# Patient Record
Sex: FEMALE | Race: WHITE | NOT HISPANIC OR LATINO | Employment: UNEMPLOYED | URBAN - METROPOLITAN AREA
[De-identification: names, ages, dates, MRNs, and addresses within clinical notes are randomized per-mention and may not be internally consistent; named-entity substitution may affect disease eponyms.]

---

## 2017-03-23 RX ORDER — MONTELUKAST SODIUM 10 MG/1
TABLET ORAL
Qty: 30 TABLET | Refills: 9 | OUTPATIENT
Start: 2017-03-23

## 2017-03-23 RX ORDER — FLUTICASONE PROPIONATE 50 MCG
SPRAY, SUSPENSION (ML) NASAL
Refills: 9 | OUTPATIENT
Start: 2017-03-23

## 2017-04-19 RX ORDER — CARVEDILOL 12.5 MG/1
TABLET ORAL
Qty: 60 TABLET | Refills: 2 | OUTPATIENT
Start: 2017-04-19

## 2017-04-19 RX ORDER — CLOPIDOGREL BISULFATE 75 MG/1
TABLET ORAL
Qty: 30 TABLET | Refills: 2 | OUTPATIENT
Start: 2017-04-19

## 2017-04-19 RX ORDER — ATORVASTATIN CALCIUM 20 MG/1
TABLET, FILM COATED ORAL
Qty: 30 TABLET | Refills: 2 | OUTPATIENT
Start: 2017-04-19

## 2017-04-19 RX ORDER — AMLODIPINE BESYLATE 5 MG/1
TABLET ORAL
Qty: 60 TABLET | Refills: 2 | OUTPATIENT
Start: 2017-04-19

## 2017-04-25 RX ORDER — CARVEDILOL 12.5 MG/1
TABLET ORAL
Qty: 60 TABLET | Refills: 2 | OUTPATIENT
Start: 2017-04-25

## 2017-04-25 RX ORDER — AMLODIPINE BESYLATE 5 MG/1
TABLET ORAL
Qty: 60 TABLET | Refills: 2 | OUTPATIENT
Start: 2017-04-25

## 2017-04-25 RX ORDER — MONTELUKAST SODIUM 10 MG/1
TABLET ORAL
Qty: 30 TABLET | Refills: 9 | OUTPATIENT
Start: 2017-04-25

## 2017-04-25 RX ORDER — CLOPIDOGREL BISULFATE 75 MG/1
TABLET ORAL
Qty: 30 TABLET | Refills: 2 | OUTPATIENT
Start: 2017-04-25

## 2017-04-25 RX ORDER — ATORVASTATIN CALCIUM 20 MG/1
TABLET, FILM COATED ORAL
Qty: 30 TABLET | Refills: 2 | OUTPATIENT
Start: 2017-04-25

## 2017-04-25 RX ORDER — FLUTICASONE PROPIONATE 50 MCG
SPRAY, SUSPENSION (ML) NASAL
Refills: 9 | OUTPATIENT
Start: 2017-04-25

## 2017-04-26 ENCOUNTER — TELEPHONE (OUTPATIENT)
Dept: INTERNAL MEDICINE | Facility: CLINIC | Age: 62
End: 2017-04-26

## 2017-04-26 RX ORDER — ATORVASTATIN CALCIUM 20 MG/1
20 TABLET, FILM COATED ORAL DAILY
Qty: 30 TABLET | Refills: 0 | Status: SHIPPED | OUTPATIENT
Start: 2017-04-26 | End: 2017-05-22 | Stop reason: SDUPTHER

## 2017-04-26 RX ORDER — CARVEDILOL 12.5 MG/1
12.5 TABLET ORAL 2 TIMES DAILY WITH MEALS
Qty: 60 TABLET | Refills: 0 | Status: SHIPPED | OUTPATIENT
Start: 2017-04-26 | End: 2017-05-22 | Stop reason: SDUPTHER

## 2017-04-26 RX ORDER — CLOPIDOGREL BISULFATE 75 MG/1
75 TABLET ORAL DAILY
Qty: 30 TABLET | Refills: 0 | Status: SHIPPED | OUTPATIENT
Start: 2017-04-26 | End: 2017-05-22 | Stop reason: SDUPTHER

## 2017-04-26 RX ORDER — PANTOPRAZOLE SODIUM 40 MG/1
40 TABLET, DELAYED RELEASE ORAL DAILY
Qty: 30 TABLET | Refills: 0 | Status: SHIPPED | OUTPATIENT
Start: 2017-04-26 | End: 2017-05-22 | Stop reason: SDUPTHER

## 2017-04-26 RX ORDER — AMLODIPINE BESYLATE 5 MG/1
5 TABLET ORAL DAILY
Qty: 60 TABLET | Refills: 0 | Status: SHIPPED | OUTPATIENT
Start: 2017-04-26 | End: 2017-05-22 | Stop reason: SDUPTHER

## 2017-04-26 NOTE — TELEPHONE ENCOUNTER
----- Message from Sofya Doherty sent at 4/26/2017  2:28 PM EDT -----  PT CALLED NEEDING REFILLS ON RXS clopidogrel, carvedilol, amLODIPine, atorvastatin, AND pantoprazole  SHE CAN BE REACHED [phone]    [facility]

## 2017-05-22 ENCOUNTER — OFFICE VISIT (OUTPATIENT)
Dept: INTERNAL MEDICINE | Facility: CLINIC | Age: 62
End: 2017-05-22

## 2017-05-22 VITALS
TEMPERATURE: 98 F | HEART RATE: 74 BPM | OXYGEN SATURATION: 97 % | DIASTOLIC BLOOD PRESSURE: 76 MMHG | WEIGHT: 151 LBS | BODY MASS INDEX: 26.75 KG/M2 | RESPIRATION RATE: 18 BRPM | SYSTOLIC BLOOD PRESSURE: 140 MMHG

## 2017-05-22 DIAGNOSIS — I10 ESSENTIAL HYPERTENSION: Primary | ICD-10-CM

## 2017-05-22 DIAGNOSIS — K21.9 GASTROESOPHAGEAL REFLUX DISEASE, ESOPHAGITIS PRESENCE NOT SPECIFIED: ICD-10-CM

## 2017-05-22 DIAGNOSIS — I10 ESSENTIAL HYPERTENSION: ICD-10-CM

## 2017-05-22 DIAGNOSIS — E11.9 TYPE 2 DIABETES MELLITUS WITHOUT COMPLICATION, WITHOUT LONG-TERM CURRENT USE OF INSULIN (HCC): ICD-10-CM

## 2017-05-22 PROBLEM — J30.2 SEASONAL ALLERGIC RHINITIS: Status: ACTIVE | Noted: 2017-05-22

## 2017-05-22 PROBLEM — J44.9 CHRONIC OBSTRUCTIVE PULMONARY DISEASE (HCC): Status: ACTIVE | Noted: 2017-05-22

## 2017-05-22 PROBLEM — E34.9 DISORDER OF ENDOCRINE SYSTEM: Status: ACTIVE | Noted: 2017-05-22

## 2017-05-22 PROBLEM — K25.9 PEPTIC ULCER OF STOMACH: Status: ACTIVE | Noted: 2017-05-22

## 2017-05-22 PROBLEM — M06.9 RHEUMATOID ARTHRITIS (HCC): Status: ACTIVE | Noted: 2017-05-22

## 2017-05-22 PROBLEM — K83.8 BILIARY SLUDGE: Status: ACTIVE | Noted: 2017-05-22

## 2017-05-22 PROBLEM — M54.50 CHRONIC LOW BACK PAIN: Status: ACTIVE | Noted: 2017-05-22

## 2017-05-22 PROBLEM — N39.0 CHRONIC URINARY TRACT INFECTION: Status: ACTIVE | Noted: 2017-05-22

## 2017-05-22 PROBLEM — E78.5 DYSLIPIDEMIA: Status: ACTIVE | Noted: 2017-05-22

## 2017-05-22 PROBLEM — N28.9 DISORDER OF KIDNEY: Status: ACTIVE | Noted: 2017-05-22

## 2017-05-22 PROBLEM — I63.9 CEREBRAL INFARCTION (HCC): Status: ACTIVE | Noted: 2017-05-22

## 2017-05-22 PROBLEM — M81.0 OSTEOPOROSIS: Status: ACTIVE | Noted: 2017-05-22

## 2017-05-22 PROBLEM — I25.10 CORONARY ARTERIOSCLEROSIS IN NATIVE ARTERY: Status: ACTIVE | Noted: 2017-05-22

## 2017-05-22 PROBLEM — I48.91 ATRIAL FIBRILLATION (HCC): Status: ACTIVE | Noted: 2017-05-22

## 2017-05-22 PROBLEM — J45.909 ASTHMA: Status: ACTIVE | Noted: 2017-05-22

## 2017-05-22 PROBLEM — K82.8 BILIARY DYSKINESIA: Status: ACTIVE | Noted: 2017-05-22

## 2017-05-22 PROBLEM — F41.8 MIXED ANXIETY DEPRESSIVE DISORDER: Status: ACTIVE | Noted: 2017-05-22

## 2017-05-22 PROBLEM — R91.8 LUNG MASS: Status: ACTIVE | Noted: 2017-05-22

## 2017-05-22 PROBLEM — M79.7 FIBROMYALGIA: Status: ACTIVE | Noted: 2017-05-22

## 2017-05-22 PROBLEM — M19.90 OSTEOARTHRITIS: Status: ACTIVE | Noted: 2017-05-22

## 2017-05-22 PROBLEM — K74.60 HEPATIC CIRRHOSIS (HCC): Status: ACTIVE | Noted: 2017-05-22

## 2017-05-22 PROBLEM — J42 CHRONIC BRONCHITIS (HCC): Status: ACTIVE | Noted: 2017-05-22

## 2017-05-22 PROBLEM — G89.29 CHRONIC LOW BACK PAIN: Status: ACTIVE | Noted: 2017-05-22

## 2017-05-22 LAB — HBA1C MFR BLD: 6 %

## 2017-05-22 PROCEDURE — 99214 OFFICE O/P EST MOD 30 MIN: CPT | Performed by: PHYSICIAN ASSISTANT

## 2017-05-22 PROCEDURE — 83036 HEMOGLOBIN GLYCOSYLATED A1C: CPT | Performed by: PHYSICIAN ASSISTANT

## 2017-05-22 RX ORDER — AMLODIPINE BESYLATE 10 MG/1
10 TABLET ORAL DAILY
Qty: 90 TABLET | Refills: 0 | Status: SHIPPED | OUTPATIENT
Start: 2017-05-22 | End: 2017-08-16 | Stop reason: SDUPTHER

## 2017-05-22 RX ORDER — AMLODIPINE BESYLATE 10 MG/1
10 TABLET ORAL DAILY
Qty: 30 TABLET | Refills: 5 | Status: SHIPPED | OUTPATIENT
Start: 2017-05-22 | End: 2017-05-22 | Stop reason: SDUPTHER

## 2017-05-22 RX ORDER — FLUTICASONE PROPIONATE 50 MCG
SPRAY, SUSPENSION (ML) NASAL DAILY
COMMUNITY
Start: 2016-03-01 | End: 2018-04-10

## 2017-05-22 RX ORDER — ATORVASTATIN CALCIUM 20 MG/1
20 TABLET, FILM COATED ORAL NIGHTLY
Qty: 90 TABLET | Refills: 0 | Status: SHIPPED | OUTPATIENT
Start: 2017-05-22 | End: 2018-04-30 | Stop reason: SDUPTHER

## 2017-05-22 RX ORDER — ATORVASTATIN CALCIUM 20 MG/1
TABLET, FILM COATED ORAL
Qty: 30 TABLET | Refills: 0 | Status: SHIPPED | OUTPATIENT
Start: 2017-05-22 | End: 2017-05-22 | Stop reason: SDUPTHER

## 2017-05-22 RX ORDER — PANTOPRAZOLE SODIUM 40 MG/1
40 TABLET, DELAYED RELEASE ORAL DAILY
Qty: 90 TABLET | Refills: 0 | Status: SHIPPED | OUTPATIENT
Start: 2017-05-22 | End: 2018-06-04 | Stop reason: SDUPTHER

## 2017-05-22 RX ORDER — CLOPIDOGREL BISULFATE 75 MG/1
75 TABLET ORAL DAILY
Qty: 30 TABLET | Refills: 5 | Status: SHIPPED | OUTPATIENT
Start: 2017-05-22 | End: 2017-05-22 | Stop reason: SDUPTHER

## 2017-05-22 RX ORDER — DIPHENHYDRAMINE HCL 25 MG
25 TABLET ORAL 2 TIMES DAILY PRN
Status: ON HOLD | COMMUNITY
Start: 2016-01-20 | End: 2018-07-11

## 2017-05-22 RX ORDER — CARVEDILOL 12.5 MG/1
12.5 TABLET ORAL 2 TIMES DAILY WITH MEALS
Qty: 60 TABLET | Refills: 5 | Status: SHIPPED | OUTPATIENT
Start: 2017-05-22 | End: 2017-05-22 | Stop reason: SDUPTHER

## 2017-05-22 RX ORDER — ATORVASTATIN CALCIUM 20 MG/1
20 TABLET, FILM COATED ORAL NIGHTLY
Qty: 30 TABLET | Refills: 5 | Status: SHIPPED | OUTPATIENT
Start: 2017-05-22 | End: 2017-05-22 | Stop reason: SDUPTHER

## 2017-05-22 RX ORDER — CLOPIDOGREL BISULFATE 75 MG/1
75 TABLET ORAL DAILY
Qty: 90 TABLET | Refills: 0 | Status: SHIPPED | OUTPATIENT
Start: 2017-05-22 | End: 2018-04-02 | Stop reason: SDUPTHER

## 2017-05-22 RX ORDER — PANTOPRAZOLE SODIUM 40 MG/1
40 TABLET, DELAYED RELEASE ORAL DAILY
Qty: 30 TABLET | Refills: 2 | Status: SHIPPED | OUTPATIENT
Start: 2017-05-22 | End: 2017-05-22 | Stop reason: SDUPTHER

## 2017-05-22 RX ORDER — CARVEDILOL 12.5 MG/1
12.5 TABLET ORAL 2 TIMES DAILY WITH MEALS
Qty: 180 TABLET | Refills: 0 | Status: SHIPPED | OUTPATIENT
Start: 2017-05-22 | End: 2018-04-02 | Stop reason: SDUPTHER

## 2017-05-22 RX ORDER — CARVEDILOL 12.5 MG/1
TABLET ORAL
Qty: 60 TABLET | Refills: 0 | Status: SHIPPED | OUTPATIENT
Start: 2017-05-22 | End: 2017-05-22 | Stop reason: SDUPTHER

## 2017-05-22 RX ORDER — LEVOCETIRIZINE DIHYDROCHLORIDE 5 MG/1
TABLET, FILM COATED ORAL DAILY
COMMUNITY
Start: 2016-03-01 | End: 2018-04-10

## 2017-05-22 RX ORDER — SAL ACID/UREA/PETROLATUM,WHITE 5 %-10 %
OINTMENT (GRAM) TOPICAL
COMMUNITY
Start: 2015-09-25 | End: 2018-04-10 | Stop reason: HOSPADM

## 2017-05-22 RX ORDER — CLOPIDOGREL BISULFATE 75 MG/1
TABLET ORAL
Qty: 30 TABLET | Refills: 0 | Status: SHIPPED | OUTPATIENT
Start: 2017-05-22 | End: 2017-05-22 | Stop reason: SDUPTHER

## 2017-08-16 DIAGNOSIS — I10 ESSENTIAL HYPERTENSION: ICD-10-CM

## 2017-08-16 RX ORDER — AMLODIPINE BESYLATE 10 MG/1
TABLET ORAL
Qty: 90 TABLET | Refills: 0 | Status: SHIPPED | OUTPATIENT
Start: 2017-08-16 | End: 2017-11-16 | Stop reason: SDUPTHER

## 2017-11-16 DIAGNOSIS — I10 ESSENTIAL HYPERTENSION: ICD-10-CM

## 2017-11-16 RX ORDER — AMLODIPINE BESYLATE 10 MG/1
10 TABLET ORAL DAILY
Qty: 90 TABLET | Refills: 0 | Status: SHIPPED | OUTPATIENT
Start: 2017-11-16 | End: 2018-04-02 | Stop reason: SDUPTHER

## 2018-03-04 DIAGNOSIS — I10 ESSENTIAL HYPERTENSION: ICD-10-CM

## 2018-03-05 RX ORDER — AMLODIPINE BESYLATE 10 MG/1
TABLET ORAL
Qty: 90 TABLET | Refills: 0 | OUTPATIENT
Start: 2018-03-05

## 2018-03-29 DIAGNOSIS — I10 ESSENTIAL HYPERTENSION: ICD-10-CM

## 2018-03-29 RX ORDER — AMLODIPINE BESYLATE 10 MG/1
TABLET ORAL
Qty: 90 TABLET | Refills: 0 | OUTPATIENT
Start: 2018-03-29

## 2018-04-02 DIAGNOSIS — I10 ESSENTIAL HYPERTENSION: ICD-10-CM

## 2018-04-02 RX ORDER — CLOPIDOGREL BISULFATE 75 MG/1
75 TABLET ORAL DAILY
Qty: 30 TABLET | Refills: 0 | Status: SHIPPED | OUTPATIENT
Start: 2018-04-02 | End: 2018-04-25 | Stop reason: HOSPADM

## 2018-04-02 RX ORDER — CARVEDILOL 12.5 MG/1
12.5 TABLET ORAL 2 TIMES DAILY WITH MEALS
Qty: 60 TABLET | Refills: 0 | Status: SHIPPED | OUTPATIENT
Start: 2018-04-02 | End: 2018-04-29 | Stop reason: SDUPTHER

## 2018-04-02 RX ORDER — AMLODIPINE BESYLATE 10 MG/1
TABLET ORAL
Qty: 30 TABLET | Refills: 0 | Status: SHIPPED | OUTPATIENT
Start: 2018-04-02 | End: 2018-04-29 | Stop reason: SDUPTHER

## 2018-04-02 NOTE — TELEPHONE ENCOUNTER
Notified patient that rx's had been sent to the pharmacy and we would see her next week at her appointment on 4-10-18.  Patient verbalized appreciation.

## 2018-04-02 NOTE — TELEPHONE ENCOUNTER
----- Message from Hillary Zuleta sent at 4/2/2018  2:41 PM EDT -----  Contact: SELF  KERA BARNETT CALLED TO SET UP AN APPT WITH YOU TO RE-EST FROM DR SANTAMARIA ON 4/1/18. SHE IS NEEDING REFILLS FOR AMLODIPINE, CARVEDILOL AND CLOPIDOGREL, SHE IS OUT AND WANTS TO KNOW IF SHE CAN GET ENOUGH TO LAST UNTIL HER APPT. SHE USES THE [facility] IN [place]. SHE CAN BE REACHED [phone]

## 2018-04-08 NOTE — PROGRESS NOTES
"Subjective:    Betsy Chi is a 62 y.o. female.     Chief Complaint   Patient presents with   • Hypertension     re-establish care, also wanted to check about pneumonia/flu shot   • Breast Pain     L breast pain, engorged       History of Present Illness     Here to reestablish care.    Patient complains of HTN, years of duration-reports it fluctuates, worsened with illness, stress, pain and smoking.    Patient complains of T2DM, years of duration, associated with hyperglycemia, does not check glucose at all, worsened with illness-requests glucometer.    Patient complains of GERD, years of duration, associated with reflux of \"bile\", worsened with  \"diseased gallbladder\", food triggers and smoking.      Complains of CVA history, 3 that she can remember with weakness and lability of mood-episodes of spontaneous crying. Risks increased with smoking and HTN.    Patient complains of lump in right breast and left nipple changes/breast hardening in past few months, last mammogram about 17 years ago patient reports she feels like she has breast cancer.     Patient complains of history of psoriasis, associated with red, itchy skin on both feet.    Complains of hyperlipidemia, years of duration-controlled, worsened with smoking, attempts to eat healthy diet.    Current Outpatient Prescriptions:   •  aluminum acetate (DOMEBORO) pack packet, Apply  topically 3 (Three) Times a Day As Needed (psoriatic arthritis)., Disp: 100 each, Rfl: 6  •  amLODIPine (NORVASC) 10 MG tablet, TAKE ONE TABLET BY MOUTH DAILY, Disp: 30 tablet, Rfl: 0  •  atorvastatin (LIPITOR) 20 MG tablet, Take 1 tablet by mouth Every Night., Disp: 90 tablet, Rfl: 0  •  carvedilol (COREG) 12.5 MG tablet, Take 1 tablet by mouth 2 (Two) Times a Day With Meals., Disp: 60 tablet, Rfl: 0  •  clopidogrel (PLAVIX) 75 MG tablet, Take 1 tablet by mouth Daily., Disp: 30 tablet, Rfl: 0  •  Cranberry 600 MG tablet, Take  by mouth., Disp: , Rfl:   •  diphenhydrAMINE " (BENADRYL) 25 MG tablet, Take  by mouth., Disp: , Rfl:   •  pantoprazole (PROTONIX) 40 MG EC tablet, Take 1 tablet by mouth Daily., Disp: 90 tablet, Rfl: 0  •  glucose blood test strip, Use as instructed, Disp: 100 each, Rfl: 12  •  glucose monitor monitoring kit, 1 each 3 (Three) Times a Day As Needed (hypo/hyperglycemia)., Disp: 1 each, Rfl: 3  •  nitrofurantoin, macrocrystal-monohydrate, (MACROBID) 100 MG capsule, Take 1 capsule by mouth Every 12 (Twelve) Hours for 7 days., Disp: 14 capsule, Rfl: 0     The following portions of the patient's history were reviewed and updated as appropriate: allergies, current medications, past family history, past medical history, past social history, past surgical history and problem list.    Review of Systems   Constitutional: Negative for chills, fatigue and fever.   HENT: Negative for congestion, dental problem, mouth sores, nosebleeds, postnasal drip, rhinorrhea, sinus pain, sinus pressure, sneezing and sore throat.         Denies snoring.   Eyes: Negative for pain, discharge, redness and itching.   Respiratory: Negative for cough, shortness of breath and wheezing.    Cardiovascular: Negative for chest pain, palpitations and leg swelling.        No RAY, orthopnea, PND, or claudication.   Gastrointestinal: Negative for abdominal distention, abdominal pain, blood in stool, diarrhea, nausea and vomiting.        GERD   Endocrine: Negative for cold intolerance, heat intolerance, polydipsia and polyuria.        T2DM   Genitourinary: Negative for difficulty urinating, dysuria, frequency, hematuria and urgency.        Urinary incontinence   Musculoskeletal: Positive for arthralgias. Negative for gait problem, joint swelling and myalgias.   Skin: Positive for color change and rash. Negative for wound.        Left breast changes, right breast lump, Psoriasis   Allergic/Immunologic: Negative.    Neurological: Negative for dizziness, syncope, weakness, light-headedness, numbness and  "headaches.        Memory loss after CVA   Hematological: Negative for adenopathy. Does not bruise/bleed easily.   Psychiatric/Behavioral: Negative for suicidal ideas.        Lability/spontaneous crying after CVA       Objective:    /72   Pulse 84   Temp 97.6 °F (36.4 °C) (Temporal Artery )   Resp 16   Ht 160 cm (63\")   Wt 77.1 kg (170 lb)   BMI 30.11 kg/m²     Physical Exam   Constitutional: She is oriented to person, place, and time. She appears well-developed and well-nourished. She is cooperative. She is easily aroused.  Non-toxic appearance. She does not have a sickly appearance. She does not appear ill. No distress.   HENT:   Head: Normocephalic and atraumatic. Head is without abrasion. Hair is normal.   Right Ear: Hearing, tympanic membrane, external ear and ear canal normal. No foreign bodies. Tympanic membrane is not perforated and not erythematous.   Left Ear: Hearing, tympanic membrane, external ear and ear canal normal. No foreign bodies. Tympanic membrane is not perforated and not erythematous.   Nose: Nose normal. No mucosal edema, rhinorrhea or septal deviation. No epistaxis.  No foreign bodies.   Mouth/Throat: Oropharynx is clear and moist. No oral lesions. Normal dentition.   Eyes: Conjunctivae and lids are normal. Pupils are equal, round, and reactive to light. Right eye exhibits no discharge. Left eye exhibits no discharge. Right conjunctiva is not injected. Left conjunctiva is not injected. No scleral icterus.   Neck: Normal range of motion and full passive range of motion without pain. Neck supple. No edema and normal range of motion present. No thyroid mass and no thyromegaly present.   Cardiovascular: Normal rate, regular rhythm and normal heart sounds.  Exam reveals no gallop and no friction rub.    No murmur heard.  Pulmonary/Chest: Effort normal and breath sounds normal. No accessory muscle usage. She has no rhonchi. She has no rales. Right breast exhibits mass and tenderness. " Left breast exhibits mass, skin change and tenderness.   Left breast with peau d'orange changes-dimpling, inversion of nipple/discoloration, breast hardened with tenderness right breast with 1 cm tender mobile lump at outer  at upper outer quadrant   Abdominal: Soft. Bowel sounds are normal. She exhibits no distension. There is no hepatosplenomegaly or hepatomegaly. There is no tenderness. There is no CVA tenderness.   Musculoskeletal: She exhibits no edema or deformity.        Left shoulder: She exhibits decreased range of motion, tenderness, bony tenderness and pain.   Lymphadenopathy:     She has no cervical adenopathy.   Neurological: She is alert, oriented to person, place, and time and easily aroused. No cranial nerve deficit. Coordination normal.   Skin: Skin is warm, dry and intact. Rash noted. No abrasion noted. She is not diaphoretic. No cyanosis or erythema. Nails show no clubbing.   Psoriasis on bilateral feet-erythematous,   inflamed skin on bilateral feet   Psychiatric: Her speech is normal. Her affect is labile. She exhibits abnormal recent memory.   Nursing note and vitals reviewed.      Assessment/Plan:    Betsy was seen today for hypertension and breast pain.    Diagnoses and all orders for this visit:    Psoriasis  -     aluminum acetate (DOMEBORO) pack packet; Apply  topically 3 (Three) Times a Day As Needed (psoriatic arthritis).    Essential hypertension  -     POC Microalbumin  -     POC Urinalysis Dipstick, Automated    Type 2 diabetes mellitus without complication, without long-term current use of insulin  -     POC Glycosylated Hemoglobin (Hb A1C)  -     POC Urinalysis Dipstick, Automated  -     glucose monitor monitoring kit; 1 each 3 (Three) Times a Day As Needed (hypo/hyperglycemia).  -     glucose blood test strip; Use as instructed    Encounter for screening colonoscopy  -     Ambulatory Referral For Screening Colonoscopy  Need for hepatitis C screening test  -     Hepatitis C  antibody  Dyslipidemia  Continue medication as prescribed  Postmenopausal bone loss  -     DEXA Bone Density Axial; Future  Painful lumpy left breast  -     Mammo Diagnostic Bilateral With CAD; Future  -     CBC & Differential; Future  Lump of right breast  -     Mammo Diagnostic Bilateral With CAD; Future  Flu vaccine need  -     Flu Vaccine Quad PF 3YR+  Need for pneumococcal vaccination  -     Pneumococcal Polysaccharide Vaccine 23-Valent Greater Than or Equal To 1yo Subcutaneous / IM    Urinary tract infection with hematuria, site unspecified  -     Urine Culture - Urine, Urine, Clean Catch; Future    Mixed anxiety depressive disorder        Return in about 1 month (around 5/10/2018), or if symptoms worsen or fail to improve, for physical pap.

## 2018-04-10 ENCOUNTER — TRANSCRIBE ORDERS (OUTPATIENT)
Dept: LAB | Facility: HOSPITAL | Age: 63
End: 2018-04-10

## 2018-04-10 ENCOUNTER — APPOINTMENT (OUTPATIENT)
Dept: LAB | Facility: HOSPITAL | Age: 63
End: 2018-04-10

## 2018-04-10 ENCOUNTER — OFFICE VISIT (OUTPATIENT)
Dept: INTERNAL MEDICINE | Facility: CLINIC | Age: 63
End: 2018-04-10

## 2018-04-10 VITALS
HEART RATE: 84 BPM | HEIGHT: 63 IN | BODY MASS INDEX: 30.12 KG/M2 | DIASTOLIC BLOOD PRESSURE: 72 MMHG | RESPIRATION RATE: 16 BRPM | SYSTOLIC BLOOD PRESSURE: 142 MMHG | WEIGHT: 170 LBS | TEMPERATURE: 97.6 F

## 2018-04-10 DIAGNOSIS — Z12.11 ENCOUNTER FOR SCREENING COLONOSCOPY: ICD-10-CM

## 2018-04-10 DIAGNOSIS — R31.9 URINARY TRACT INFECTION WITH HEMATURIA, SITE UNSPECIFIED: ICD-10-CM

## 2018-04-10 DIAGNOSIS — L40.9 PSORIASIS: Primary | ICD-10-CM

## 2018-04-10 DIAGNOSIS — Z13.29 SCREENING FOR ENDOCRINE DISORDER: ICD-10-CM

## 2018-04-10 DIAGNOSIS — Z13.89 ENCOUNTER FOR SCREENING FOR OTHER DISORDER: ICD-10-CM

## 2018-04-10 DIAGNOSIS — Z13.220 SCREENING FOR LIPOID DISORDERS: ICD-10-CM

## 2018-04-10 DIAGNOSIS — Z11.59 SCREENING EXAMINATION FOR POLIOMYELITIS: Primary | ICD-10-CM

## 2018-04-10 DIAGNOSIS — Z23 FLU VACCINE NEED: ICD-10-CM

## 2018-04-10 DIAGNOSIS — Z23 NEED FOR PNEUMOCOCCAL VACCINATION: ICD-10-CM

## 2018-04-10 DIAGNOSIS — F41.8 MIXED ANXIETY DEPRESSIVE DISORDER: ICD-10-CM

## 2018-04-10 DIAGNOSIS — Z11.59 NEED FOR HEPATITIS C SCREENING TEST: ICD-10-CM

## 2018-04-10 DIAGNOSIS — N63.20 PAINFUL LUMPY LEFT BREAST: ICD-10-CM

## 2018-04-10 DIAGNOSIS — N39.0 URINARY TRACT INFECTION WITH HEMATURIA, SITE UNSPECIFIED: ICD-10-CM

## 2018-04-10 DIAGNOSIS — N63.10 LUMP OF RIGHT BREAST: ICD-10-CM

## 2018-04-10 DIAGNOSIS — N64.4 PAINFUL LUMPY LEFT BREAST: ICD-10-CM

## 2018-04-10 DIAGNOSIS — I10 ESSENTIAL HYPERTENSION: ICD-10-CM

## 2018-04-10 DIAGNOSIS — Z12.31 SCREENING MAMMOGRAM, ENCOUNTER FOR: ICD-10-CM

## 2018-04-10 DIAGNOSIS — M81.0 POSTMENOPAUSAL BONE LOSS: ICD-10-CM

## 2018-04-10 DIAGNOSIS — E78.5 DYSLIPIDEMIA: ICD-10-CM

## 2018-04-10 DIAGNOSIS — E11.9 TYPE 2 DIABETES MELLITUS WITHOUT COMPLICATION, WITHOUT LONG-TERM CURRENT USE OF INSULIN (HCC): ICD-10-CM

## 2018-04-10 LAB
A/C: <30
BILIRUB BLD-MCNC: NEGATIVE MG/DL
CLARITY, POC: CLEAR
COLOR UR: YELLOW
GLUCOSE UR STRIP-MCNC: NEGATIVE MG/DL
HBA1C MFR BLD: 5.8 %
HCV AB SER DONR QL: NORMAL
KETONES UR QL: NEGATIVE
LEUKOCYTE EST, POC: ABNORMAL
NITRITE UR-MCNC: POSITIVE MG/ML
PH UR: 5 [PH] (ref 5–8)
POC CREATININE URINE: 100
POC MICROALBUMIN URINE: 30
PROT UR STRIP-MCNC: NEGATIVE MG/DL
RBC # UR STRIP: ABNORMAL /UL
SP GR UR: 1.01 (ref 1–1.03)
UROBILINOGEN UR QL: NORMAL

## 2018-04-10 PROCEDURE — 86803 HEPATITIS C AB TEST: CPT | Performed by: NURSE PRACTITIONER

## 2018-04-10 PROCEDURE — 82044 UR ALBUMIN SEMIQUANTITATIVE: CPT | Performed by: NURSE PRACTITIONER

## 2018-04-10 PROCEDURE — 90686 IIV4 VACC NO PRSV 0.5 ML IM: CPT | Performed by: NURSE PRACTITIONER

## 2018-04-10 PROCEDURE — 83036 HEMOGLOBIN GLYCOSYLATED A1C: CPT | Performed by: NURSE PRACTITIONER

## 2018-04-10 PROCEDURE — 84443 ASSAY THYROID STIM HORMONE: CPT | Performed by: NURSE PRACTITIONER

## 2018-04-10 PROCEDURE — 80061 LIPID PANEL: CPT | Performed by: NURSE PRACTITIONER

## 2018-04-10 PROCEDURE — 80053 COMPREHEN METABOLIC PANEL: CPT | Performed by: NURSE PRACTITIONER

## 2018-04-10 PROCEDURE — 99214 OFFICE O/P EST MOD 30 MIN: CPT | Performed by: NURSE PRACTITIONER

## 2018-04-10 PROCEDURE — 90732 PPSV23 VACC 2 YRS+ SUBQ/IM: CPT | Performed by: NURSE PRACTITIONER

## 2018-04-10 PROCEDURE — 90471 IMMUNIZATION ADMIN: CPT | Performed by: NURSE PRACTITIONER

## 2018-04-10 PROCEDURE — 90472 IMMUNIZATION ADMIN EACH ADD: CPT | Performed by: NURSE PRACTITIONER

## 2018-04-10 RX ORDER — SAL ACID/UREA/PETROLATUM,WHITE 5 %-10 %
OINTMENT (GRAM) TOPICAL 3 TIMES DAILY PRN
Qty: 100 EACH | Refills: 6 | Status: SHIPPED | OUTPATIENT
Start: 2018-04-10

## 2018-04-10 RX ORDER — BLOOD-GLUCOSE METER
1 KIT MISCELLANEOUS 3 TIMES DAILY PRN
Qty: 1 EACH | Refills: 3 | Status: SHIPPED | OUTPATIENT
Start: 2018-04-10

## 2018-04-11 ENCOUNTER — TELEPHONE (OUTPATIENT)
Dept: INTERNAL MEDICINE | Facility: CLINIC | Age: 63
End: 2018-04-11

## 2018-04-11 DIAGNOSIS — N39.0 URINARY TRACT INFECTION WITH HEMATURIA, SITE UNSPECIFIED: Primary | ICD-10-CM

## 2018-04-11 DIAGNOSIS — R31.9 URINARY TRACT INFECTION WITH HEMATURIA, SITE UNSPECIFIED: Primary | ICD-10-CM

## 2018-04-11 LAB
ALBUMIN SERPL-MCNC: 4.6 G/DL (ref 3.2–4.8)
ALBUMIN/GLOB SERPL: 1.8 G/DL (ref 1.5–2.5)
ALP SERPL-CCNC: 140 U/L (ref 25–100)
ALT SERPL W P-5'-P-CCNC: 15 U/L (ref 7–40)
ANION GAP SERPL CALCULATED.3IONS-SCNC: 14 MMOL/L (ref 3–11)
ARTICHOKE IGE QN: 104 MG/DL (ref 0–130)
AST SERPL-CCNC: 17 U/L (ref 0–33)
BILIRUB SERPL-MCNC: 0.3 MG/DL (ref 0.3–1.2)
BUN BLD-MCNC: 14 MG/DL (ref 9–23)
BUN/CREAT SERPL: 20 (ref 7–25)
CALCIUM SPEC-SCNC: 9.6 MG/DL (ref 8.7–10.4)
CHLORIDE SERPL-SCNC: 112 MMOL/L (ref 99–109)
CHOLEST SERPL-MCNC: 157 MG/DL (ref 0–200)
CO2 SERPL-SCNC: 19 MMOL/L (ref 20–31)
CREAT BLD-MCNC: 0.7 MG/DL (ref 0.6–1.3)
GFR SERPL CREATININE-BSD FRML MDRD: 84 ML/MIN/1.73
GLOBULIN UR ELPH-MCNC: 2.6 GM/DL
GLUCOSE BLD-MCNC: 100 MG/DL (ref 70–100)
HDLC SERPL-MCNC: 39 MG/DL (ref 40–60)
POTASSIUM BLD-SCNC: 4.6 MMOL/L (ref 3.5–5.5)
PROT SERPL-MCNC: 7.2 G/DL (ref 5.7–8.2)
SODIUM BLD-SCNC: 145 MMOL/L (ref 132–146)
TRIGL SERPL-MCNC: 110 MG/DL (ref 0–150)
TSH SERPL DL<=0.05 MIU/L-ACNC: 2.88 MIU/ML (ref 0.35–5.35)

## 2018-04-11 RX ORDER — NITROFURANTOIN 25; 75 MG/1; MG/1
100 CAPSULE ORAL EVERY 12 HOURS SCHEDULED
Qty: 14 CAPSULE | Refills: 0 | Status: SHIPPED | OUTPATIENT
Start: 2018-04-11 | End: 2018-04-18

## 2018-04-12 ENCOUNTER — TELEPHONE (OUTPATIENT)
Dept: INTERNAL MEDICINE | Facility: CLINIC | Age: 63
End: 2018-04-12

## 2018-04-12 NOTE — TELEPHONE ENCOUNTER
----- Message from Jaki Flores MA sent at 4/11/2018 11:02 AM EDT -----  Pt aware of results and will try to come later today for the rest of her labs. Pt would like to know what antibiotic you were thinking of sending in as she is allergic to most of them. Please advise?

## 2018-04-13 ENCOUNTER — LAB (OUTPATIENT)
Dept: INTERNAL MEDICINE | Facility: CLINIC | Age: 63
End: 2018-04-13

## 2018-04-13 DIAGNOSIS — R31.9 URINARY TRACT INFECTION WITH HEMATURIA, SITE UNSPECIFIED: ICD-10-CM

## 2018-04-13 DIAGNOSIS — N39.0 URINARY TRACT INFECTION WITH HEMATURIA, SITE UNSPECIFIED: ICD-10-CM

## 2018-04-13 PROCEDURE — 87086 URINE CULTURE/COLONY COUNT: CPT | Performed by: NURSE PRACTITIONER

## 2018-04-13 PROCEDURE — 87077 CULTURE AEROBIC IDENTIFY: CPT | Performed by: NURSE PRACTITIONER

## 2018-04-13 PROCEDURE — 87186 SC STD MICRODIL/AGAR DIL: CPT | Performed by: NURSE PRACTITIONER

## 2018-04-15 LAB
BACTERIA SPEC AEROBE CULT: ABNORMAL
BACTERIA SPEC AEROBE CULT: ABNORMAL

## 2018-04-22 ENCOUNTER — HOSPITAL ENCOUNTER (INPATIENT)
Facility: HOSPITAL | Age: 63
LOS: 2 days | Discharge: HOME | End: 2018-04-25
Attending: EMERGENCY MEDICINE | Admitting: HOSPITALIST

## 2018-04-22 ENCOUNTER — APPOINTMENT (OUTPATIENT)
Dept: GENERAL RADIOLOGY | Facility: HOSPITAL | Age: 63
End: 2018-04-22

## 2018-04-22 DIAGNOSIS — J18.9 PNEUMONIA OF BOTH LOWER LOBES DUE TO INFECTIOUS ORGANISM: ICD-10-CM

## 2018-04-22 DIAGNOSIS — R92.8 ABNORMAL MAMMOGRAM: ICD-10-CM

## 2018-04-22 DIAGNOSIS — R91.8 LUNG MASS: ICD-10-CM

## 2018-04-22 DIAGNOSIS — J96.21 ACUTE ON CHRONIC RESPIRATORY FAILURE WITH HYPOXIA (HCC): ICD-10-CM

## 2018-04-22 DIAGNOSIS — J44.9 CHRONIC OBSTRUCTIVE PULMONARY DISEASE, UNSPECIFIED COPD TYPE (HCC): ICD-10-CM

## 2018-04-22 DIAGNOSIS — J45.909 ASTHMA, UNSPECIFIED ASTHMA SEVERITY, UNSPECIFIED WHETHER COMPLICATED, UNSPECIFIED WHETHER PERSISTENT: ICD-10-CM

## 2018-04-22 DIAGNOSIS — J96.91 RESPIRATORY FAILURE WITH HYPOXIA, UNSPECIFIED CHRONICITY (HCC): Primary | ICD-10-CM

## 2018-04-22 DIAGNOSIS — N63.0 BREAST MASS: ICD-10-CM

## 2018-04-22 LAB
ALBUMIN SERPL-MCNC: 4.6 G/DL (ref 3.2–4.8)
ALBUMIN/GLOB SERPL: 1.5 G/DL (ref 1.5–2.5)
ALP SERPL-CCNC: 142 U/L (ref 25–100)
ALT SERPL W P-5'-P-CCNC: 13 U/L (ref 7–40)
ANION GAP SERPL CALCULATED.3IONS-SCNC: 10 MMOL/L (ref 3–11)
AST SERPL-CCNC: 15 U/L (ref 0–33)
BASOPHILS # BLD AUTO: 0 10*3/MM3 (ref 0–0.2)
BASOPHILS NFR BLD AUTO: 0 % (ref 0–1)
BILIRUB SERPL-MCNC: 0.5 MG/DL (ref 0.3–1.2)
BNP SERPL-MCNC: 25 PG/ML (ref 0–100)
BUN BLD-MCNC: 16 MG/DL (ref 9–23)
BUN/CREAT SERPL: 20 (ref 7–25)
CALCIUM SPEC-SCNC: 9.3 MG/DL (ref 8.7–10.4)
CHLORIDE SERPL-SCNC: 110 MMOL/L (ref 99–109)
CO2 SERPL-SCNC: 22 MMOL/L (ref 20–31)
CREAT BLD-MCNC: 0.8 MG/DL (ref 0.6–1.3)
D-LACTATE SERPL-SCNC: 1.7 MMOL/L (ref 0.5–2)
DEPRECATED RDW RBC AUTO: 44.6 FL (ref 37–54)
EOSINOPHIL # BLD AUTO: 0.05 10*3/MM3 (ref 0–0.3)
EOSINOPHIL NFR BLD AUTO: 0.4 % (ref 0–3)
ERYTHROCYTE [DISTWIDTH] IN BLOOD BY AUTOMATED COUNT: 13.4 % (ref 11.3–14.5)
GFR SERPL CREATININE-BSD FRML MDRD: 72 ML/MIN/1.73
GLOBULIN UR ELPH-MCNC: 3 GM/DL
GLUCOSE BLD-MCNC: 106 MG/DL (ref 70–100)
HCT VFR BLD AUTO: 43.2 % (ref 34.5–44)
HGB BLD-MCNC: 14.6 G/DL (ref 11.5–15.5)
HOLD SPECIMEN: NORMAL
HOLD SPECIMEN: NORMAL
IMM GRANULOCYTES # BLD: 0.06 10*3/MM3 (ref 0–0.03)
IMM GRANULOCYTES NFR BLD: 0.5 % (ref 0–0.6)
LYMPHOCYTES # BLD AUTO: 0.63 10*3/MM3 (ref 0.6–4.8)
LYMPHOCYTES NFR BLD AUTO: 5.5 % (ref 24–44)
MCH RBC QN AUTO: 30.9 PG (ref 27–31)
MCHC RBC AUTO-ENTMCNC: 33.8 G/DL (ref 32–36)
MCV RBC AUTO: 91.3 FL (ref 80–99)
MONOCYTES # BLD AUTO: 0.57 10*3/MM3 (ref 0–1)
MONOCYTES NFR BLD AUTO: 5 % (ref 0–12)
NEUTROPHILS # BLD AUTO: 10.23 10*3/MM3 (ref 1.5–8.3)
NEUTROPHILS NFR BLD AUTO: 89.1 % (ref 41–71)
PLATELET # BLD AUTO: 224 10*3/MM3 (ref 150–450)
PMV BLD AUTO: 10.7 FL (ref 6–12)
POTASSIUM BLD-SCNC: 3.6 MMOL/L (ref 3.5–5.5)
PROT SERPL-MCNC: 7.6 G/DL (ref 5.7–8.2)
RBC # BLD AUTO: 4.73 10*6/MM3 (ref 3.89–5.14)
SODIUM BLD-SCNC: 142 MMOL/L (ref 132–146)
TROPONIN I SERPL-MCNC: <0.006 NG/ML
WBC NRBC COR # BLD: 11.48 10*3/MM3 (ref 3.5–10.8)
WHOLE BLOOD HOLD SPECIMEN: NORMAL
WHOLE BLOOD HOLD SPECIMEN: NORMAL

## 2018-04-22 PROCEDURE — P9612 CATHETERIZE FOR URINE SPEC: HCPCS

## 2018-04-22 PROCEDURE — 83605 ASSAY OF LACTIC ACID: CPT | Performed by: EMERGENCY MEDICINE

## 2018-04-22 PROCEDURE — 93005 ELECTROCARDIOGRAM TRACING: CPT | Performed by: EMERGENCY MEDICINE

## 2018-04-22 PROCEDURE — 85025 COMPLETE CBC W/AUTO DIFF WBC: CPT | Performed by: EMERGENCY MEDICINE

## 2018-04-22 PROCEDURE — 83880 ASSAY OF NATRIURETIC PEPTIDE: CPT | Performed by: EMERGENCY MEDICINE

## 2018-04-22 PROCEDURE — 71045 X-RAY EXAM CHEST 1 VIEW: CPT

## 2018-04-22 PROCEDURE — 80053 COMPREHEN METABOLIC PANEL: CPT | Performed by: EMERGENCY MEDICINE

## 2018-04-22 PROCEDURE — 99285 EMERGENCY DEPT VISIT HI MDM: CPT

## 2018-04-22 PROCEDURE — 84484 ASSAY OF TROPONIN QUANT: CPT | Performed by: EMERGENCY MEDICINE

## 2018-04-22 PROCEDURE — 93005 ELECTROCARDIOGRAM TRACING: CPT

## 2018-04-22 PROCEDURE — 87040 BLOOD CULTURE FOR BACTERIA: CPT | Performed by: EMERGENCY MEDICINE

## 2018-04-22 RX ORDER — SODIUM CHLORIDE 0.9 % (FLUSH) 0.9 %
10 SYRINGE (ML) INJECTION AS NEEDED
Status: DISCONTINUED | OUTPATIENT
Start: 2018-04-22 | End: 2018-04-25 | Stop reason: HOSPADM

## 2018-04-23 ENCOUNTER — HOSPITAL ENCOUNTER (OUTPATIENT)
Dept: MAMMOGRAPHY | Facility: HOSPITAL | Age: 63
Discharge: HOME | End: 2018-04-23
Attending: HOSPITALIST

## 2018-04-23 ENCOUNTER — TRANSCRIBE ORDERS (OUTPATIENT)
Dept: MAMMOGRAPHY | Facility: HOSPITAL | Age: 63
End: 2018-04-23

## 2018-04-23 ENCOUNTER — APPOINTMENT (OUTPATIENT)
Dept: CT IMAGING | Facility: HOSPITAL | Age: 63
End: 2018-04-23

## 2018-04-23 ENCOUNTER — APPOINTMENT (OUTPATIENT)
Dept: ULTRASOUND IMAGING | Facility: HOSPITAL | Age: 63
End: 2018-04-23

## 2018-04-23 DIAGNOSIS — N63.0 BREAST MASS: Primary | ICD-10-CM

## 2018-04-23 DIAGNOSIS — N63.0 BREAST MASS: ICD-10-CM

## 2018-04-23 PROBLEM — J96.91 RESPIRATORY FAILURE WITH HYPOXIA (HCC): Status: ACTIVE | Noted: 2018-04-23

## 2018-04-23 LAB
ARTERIAL PATENCY WRIST A: ABNORMAL
ATMOSPHERIC PRESS: ABNORMAL MMHG
BACTERIA UR QL AUTO: ABNORMAL /HPF
BASE EXCESS BLDA CALC-SCNC: -1.9 MMOL/L (ref 0–2)
BDY SITE: ABNORMAL
BILIRUB UR QL STRIP: NEGATIVE
CLARITY UR: CLEAR
CO2 BLDA-SCNC: 22.7 MMOL/L (ref 22–33)
COHGB MFR BLD: 0.8 % (ref 0–2)
COLOR UR: YELLOW
GLUCOSE BLDC GLUCOMTR-MCNC: 186 MG/DL (ref 70–130)
GLUCOSE BLDC GLUCOMTR-MCNC: 213 MG/DL (ref 70–130)
GLUCOSE BLDC GLUCOMTR-MCNC: 229 MG/DL (ref 70–130)
GLUCOSE UR STRIP-MCNC: NEGATIVE MG/DL
HBA1C MFR BLD: 5.9 % (ref 4.8–5.6)
HCO3 BLDA-SCNC: 21.7 MMOL/L (ref 20–26)
HCT VFR BLD CALC: 39.6 %
HGB BLDA-MCNC: 12.9 G/DL (ref 14–18)
HGB UR QL STRIP.AUTO: NEGATIVE
HOROWITZ INDEX BLD+IHG-RTO: 21 %
HYALINE CASTS UR QL AUTO: ABNORMAL /LPF
KETONES UR QL STRIP: ABNORMAL
LEUKOCYTE ESTERASE UR QL STRIP.AUTO: ABNORMAL
METHGB BLD QL: 1.1 % (ref 0–1.5)
MODALITY: ABNORMAL
NITRITE UR QL STRIP: NEGATIVE
OXYHGB MFR BLDV: 91 % (ref 94–99)
PCO2 BLDA: 32.4 MM HG (ref 35–45)
PH BLDA: 7.43 PH UNITS (ref 7.35–7.45)
PH UR STRIP.AUTO: <=5 [PH] (ref 5–8)
PO2 BLDA: 60.8 MM HG (ref 83–108)
PROT UR QL STRIP: NEGATIVE
RBC # UR: ABNORMAL /HPF
REF LAB TEST METHOD: ABNORMAL
SP GR UR STRIP: 1.01 (ref 1–1.03)
SQUAMOUS #/AREA URNS HPF: ABNORMAL /HPF
UROBILINOGEN UR QL STRIP: ABNORMAL
WBC UR QL AUTO: ABNORMAL /HPF

## 2018-04-23 PROCEDURE — 77062 BREAST TOMOSYNTHESIS BI: CPT | Performed by: RADIOLOGY

## 2018-04-23 PROCEDURE — 99253 IP/OBS CNSLTJ NEW/EST LOW 45: CPT | Performed by: INTERNAL MEDICINE

## 2018-04-23 PROCEDURE — G0279 TOMOSYNTHESIS, MAMMO: HCPCS

## 2018-04-23 PROCEDURE — 25010000002 DIPHENHYDRAMINE PER 50 MG: Performed by: EMERGENCY MEDICINE

## 2018-04-23 PROCEDURE — 25010000002 METHYLPREDNISOLONE PER 125 MG: Performed by: EMERGENCY MEDICINE

## 2018-04-23 PROCEDURE — 36600 WITHDRAWAL OF ARTERIAL BLOOD: CPT | Performed by: INTERNAL MEDICINE

## 2018-04-23 PROCEDURE — 77066 DX MAMMO INCL CAD BI: CPT

## 2018-04-23 PROCEDURE — 94799 UNLISTED PULMONARY SVC/PX: CPT

## 2018-04-23 PROCEDURE — 0 IOPAMIDOL PER 1 ML: Performed by: EMERGENCY MEDICINE

## 2018-04-23 PROCEDURE — 81001 URINALYSIS AUTO W/SCOPE: CPT | Performed by: EMERGENCY MEDICINE

## 2018-04-23 PROCEDURE — 71275 CT ANGIOGRAPHY CHEST: CPT

## 2018-04-23 PROCEDURE — 77066 DX MAMMO INCL CAD BI: CPT | Performed by: RADIOLOGY

## 2018-04-23 PROCEDURE — 76642 ULTRASOUND BREAST LIMITED: CPT

## 2018-04-23 PROCEDURE — 70450 CT HEAD/BRAIN W/O DYE: CPT

## 2018-04-23 PROCEDURE — 87205 SMEAR GRAM STAIN: CPT | Performed by: HOSPITALIST

## 2018-04-23 PROCEDURE — 82962 GLUCOSE BLOOD TEST: CPT

## 2018-04-23 PROCEDURE — 82805 BLOOD GASES W/O2 SATURATION: CPT | Performed by: INTERNAL MEDICINE

## 2018-04-23 PROCEDURE — 63710000001 INSULIN LISPRO (HUMAN) PER 5 UNITS: Performed by: HOSPITALIST

## 2018-04-23 PROCEDURE — 76642 ULTRASOUND BREAST LIMITED: CPT | Performed by: RADIOLOGY

## 2018-04-23 PROCEDURE — 25010000002 HEPARIN (PORCINE) PER 1000 UNITS: Performed by: HOSPITALIST

## 2018-04-23 PROCEDURE — 99223 1ST HOSP IP/OBS HIGH 75: CPT | Performed by: HOSPITALIST

## 2018-04-23 PROCEDURE — 87070 CULTURE OTHR SPECIMN AEROBIC: CPT | Performed by: HOSPITALIST

## 2018-04-23 PROCEDURE — 25010000002 VANCOMYCIN: Performed by: EMERGENCY MEDICINE

## 2018-04-23 PROCEDURE — 94760 N-INVAS EAR/PLS OXIMETRY 1: CPT

## 2018-04-23 PROCEDURE — 83036 HEMOGLOBIN GLYCOSYLATED A1C: CPT | Performed by: HOSPITALIST

## 2018-04-23 PROCEDURE — 94640 AIRWAY INHALATION TREATMENT: CPT

## 2018-04-23 RX ORDER — DEXTROSE MONOHYDRATE 25 G/50ML
25 INJECTION, SOLUTION INTRAVENOUS
Status: DISCONTINUED | OUTPATIENT
Start: 2018-04-23 | End: 2018-04-25 | Stop reason: HOSPADM

## 2018-04-23 RX ORDER — NICOTINE POLACRILEX 4 MG
15 LOZENGE BUCCAL
Status: DISCONTINUED | OUTPATIENT
Start: 2018-04-23 | End: 2018-04-25 | Stop reason: HOSPADM

## 2018-04-23 RX ORDER — CLOPIDOGREL BISULFATE 75 MG/1
75 TABLET ORAL DAILY
Status: DISCONTINUED | OUTPATIENT
Start: 2018-04-23 | End: 2018-04-25

## 2018-04-23 RX ORDER — HEPARIN SODIUM 5000 [USP'U]/ML
5000 INJECTION, SOLUTION INTRAVENOUS; SUBCUTANEOUS EVERY 12 HOURS SCHEDULED
Status: DISCONTINUED | OUTPATIENT
Start: 2018-04-23 | End: 2018-04-23

## 2018-04-23 RX ORDER — IPRATROPIUM BROMIDE AND ALBUTEROL SULFATE 2.5; .5 MG/3ML; MG/3ML
3 SOLUTION RESPIRATORY (INHALATION) EVERY 4 HOURS PRN
Status: DISCONTINUED | OUTPATIENT
Start: 2018-04-23 | End: 2018-04-25 | Stop reason: HOSPADM

## 2018-04-23 RX ORDER — ALBUTEROL SULFATE 2.5 MG/3ML
2.5 SOLUTION RESPIRATORY (INHALATION) EVERY 4 HOURS PRN
Status: DISCONTINUED | OUTPATIENT
Start: 2018-04-23 | End: 2018-04-23

## 2018-04-23 RX ORDER — IPRATROPIUM BROMIDE AND ALBUTEROL SULFATE 2.5; .5 MG/3ML; MG/3ML
3 SOLUTION RESPIRATORY (INHALATION)
Status: DISCONTINUED | OUTPATIENT
Start: 2018-04-23 | End: 2018-04-25 | Stop reason: HOSPADM

## 2018-04-23 RX ORDER — HEPARIN SODIUM 5000 [USP'U]/ML
5000 INJECTION, SOLUTION INTRAVENOUS; SUBCUTANEOUS EVERY 8 HOURS SCHEDULED
Status: COMPLETED | OUTPATIENT
Start: 2018-04-23 | End: 2018-04-23

## 2018-04-23 RX ORDER — ALBUTEROL SULFATE 2.5 MG/3ML
2.5 SOLUTION RESPIRATORY (INHALATION) ONCE
Status: COMPLETED | OUTPATIENT
Start: 2018-04-23 | End: 2018-04-23

## 2018-04-23 RX ORDER — METHYLPREDNISOLONE SODIUM SUCCINATE 125 MG/2ML
125 INJECTION, POWDER, LYOPHILIZED, FOR SOLUTION INTRAMUSCULAR; INTRAVENOUS ONCE
Status: COMPLETED | OUTPATIENT
Start: 2018-04-23 | End: 2018-04-23

## 2018-04-23 RX ORDER — CARVEDILOL 12.5 MG/1
12.5 TABLET ORAL 2 TIMES DAILY WITH MEALS
Status: DISCONTINUED | OUTPATIENT
Start: 2018-04-23 | End: 2018-04-25 | Stop reason: HOSPADM

## 2018-04-23 RX ORDER — SODIUM CHLORIDE 0.9 % (FLUSH) 0.9 %
1-10 SYRINGE (ML) INJECTION AS NEEDED
Status: DISCONTINUED | OUTPATIENT
Start: 2018-04-23 | End: 2018-04-25 | Stop reason: HOSPADM

## 2018-04-23 RX ORDER — ALBUTEROL SULFATE 2.5 MG/3ML
2.5 SOLUTION RESPIRATORY (INHALATION)
Status: DISCONTINUED | OUTPATIENT
Start: 2018-04-23 | End: 2018-04-23

## 2018-04-23 RX ORDER — DIPHENHYDRAMINE HYDROCHLORIDE 50 MG/ML
25 INJECTION INTRAMUSCULAR; INTRAVENOUS ONCE
Status: COMPLETED | OUTPATIENT
Start: 2018-04-23 | End: 2018-04-23

## 2018-04-23 RX ADMIN — CARVEDILOL 12.5 MG: 12.5 TABLET, FILM COATED ORAL at 09:24

## 2018-04-23 RX ADMIN — INSULIN LISPRO 2 UNITS: 100 INJECTION, SOLUTION INTRAVENOUS; SUBCUTANEOUS at 16:59

## 2018-04-23 RX ADMIN — ALBUTEROL SULFATE 2.5 MG: 2.5 SOLUTION RESPIRATORY (INHALATION) at 08:05

## 2018-04-23 RX ADMIN — IOPAMIDOL 53 ML: 755 INJECTION, SOLUTION INTRAVENOUS at 02:50

## 2018-04-23 RX ADMIN — HEPARIN SODIUM 5000 UNITS: 5000 INJECTION, SOLUTION INTRAVENOUS; SUBCUTANEOUS at 20:51

## 2018-04-23 RX ADMIN — VANCOMYCIN HYDROCHLORIDE 1500 MG: 10 INJECTION, POWDER, LYOPHILIZED, FOR SOLUTION INTRAVENOUS at 06:18

## 2018-04-23 RX ADMIN — HEPARIN SODIUM 5000 UNITS: 5000 INJECTION, SOLUTION INTRAVENOUS; SUBCUTANEOUS at 09:11

## 2018-04-23 RX ADMIN — AZTREONAM 1 G: 1 INJECTION, POWDER, LYOPHILIZED, FOR SOLUTION INTRAMUSCULAR; INTRAVENOUS at 05:42

## 2018-04-23 RX ADMIN — DIPHENHYDRAMINE HYDROCHLORIDE 25 MG: 50 INJECTION INTRAMUSCULAR; INTRAVENOUS at 02:03

## 2018-04-23 RX ADMIN — ALBUTEROL SULFATE 2.5 MG: 2.5 SOLUTION RESPIRATORY (INHALATION) at 12:07

## 2018-04-23 RX ADMIN — AZTREONAM 1 G: 1 INJECTION, POWDER, LYOPHILIZED, FOR SOLUTION INTRAMUSCULAR; INTRAVENOUS at 09:24

## 2018-04-23 RX ADMIN — METHYLPREDNISOLONE SODIUM SUCCINATE 125 MG: 125 INJECTION, POWDER, FOR SOLUTION INTRAMUSCULAR; INTRAVENOUS at 02:06

## 2018-04-23 RX ADMIN — ALBUTEROL SULFATE 2.5 MG: 2.5 SOLUTION RESPIRATORY (INHALATION) at 04:38

## 2018-04-23 RX ADMIN — ALBUTEROL SULFATE 2.5 MG: 2.5 SOLUTION RESPIRATORY (INHALATION) at 19:19

## 2018-04-23 RX ADMIN — INSULIN LISPRO 3 UNITS: 100 INJECTION, SOLUTION INTRAVENOUS; SUBCUTANEOUS at 12:13

## 2018-04-23 RX ADMIN — CLOPIDOGREL BISULFATE 75 MG: 75 TABLET ORAL at 09:24

## 2018-04-23 RX ADMIN — HEPARIN SODIUM 5000 UNITS: 5000 INJECTION, SOLUTION INTRAVENOUS; SUBCUTANEOUS at 16:59

## 2018-04-23 RX ADMIN — CARVEDILOL 12.5 MG: 12.5 TABLET, FILM COATED ORAL at 16:59

## 2018-04-23 RX ADMIN — INSULIN LISPRO 3 UNITS: 100 INJECTION, SOLUTION INTRAVENOUS; SUBCUTANEOUS at 20:51

## 2018-04-24 ENCOUNTER — APPOINTMENT (OUTPATIENT)
Dept: GENERAL RADIOLOGY | Facility: HOSPITAL | Age: 63
End: 2018-04-24

## 2018-04-24 ENCOUNTER — APPOINTMENT (OUTPATIENT)
Dept: PULMONOLOGY | Facility: HOSPITAL | Age: 63
End: 2018-04-24
Attending: INTERNAL MEDICINE

## 2018-04-24 LAB
ANION GAP SERPL CALCULATED.3IONS-SCNC: 7 MMOL/L (ref 3–11)
APTT PPP: 27.3 SECONDS (ref 55–70)
ARTERIAL PATENCY WRIST A: ABNORMAL
ATMOSPHERIC PRESS: ABNORMAL MMHG
BASE EXCESS BLDA CALC-SCNC: 1 MMOL/L (ref 0–2)
BDY SITE: ABNORMAL
BUN BLD-MCNC: 13 MG/DL (ref 9–23)
BUN/CREAT SERPL: 21.7 (ref 7–25)
CALCIUM SPEC-SCNC: 8.9 MG/DL (ref 8.7–10.4)
CHLORIDE SERPL-SCNC: 112 MMOL/L (ref 99–109)
CO2 BLDA-SCNC: 25.4 MMOL/L (ref 22–33)
CO2 SERPL-SCNC: 23 MMOL/L (ref 20–31)
COHGB MFR BLD: 0.7 % (ref 0–2)
CREAT BLD-MCNC: 0.6 MG/DL (ref 0.6–1.3)
GFR SERPL CREATININE-BSD FRML MDRD: 101 ML/MIN/1.73
GLUCOSE BLD-MCNC: 124 MG/DL (ref 70–100)
GLUCOSE BLDC GLUCOMTR-MCNC: 104 MG/DL (ref 70–130)
GLUCOSE BLDC GLUCOMTR-MCNC: 106 MG/DL (ref 70–130)
GLUCOSE BLDC GLUCOMTR-MCNC: 110 MG/DL (ref 70–130)
GLUCOSE BLDC GLUCOMTR-MCNC: 115 MG/DL (ref 70–130)
HCO3 BLDA-SCNC: 24.3 MMOL/L (ref 20–26)
HCT VFR BLD CALC: 39.6 %
HGB BLDA-MCNC: 12.9 G/DL (ref 14–18)
HOROWITZ INDEX BLD+IHG-RTO: 21 %
INR PPP: 1.08 (ref 0.91–1.09)
METHGB BLD QL: 1 % (ref 0–1.5)
MODALITY: ABNORMAL
OXYHGB MFR BLDV: 90.7 % (ref 94–99)
PCO2 BLDA: 33.9 MM HG (ref 35–45)
PH BLDA: 7.46 PH UNITS (ref 7.35–7.45)
PO2 BLDA: 58.5 MM HG (ref 83–108)
POTASSIUM BLD-SCNC: 3.9 MMOL/L (ref 3.5–5.5)
PROTHROMBIN TIME: 11.3 SECONDS (ref 9.6–11.5)
SODIUM BLD-SCNC: 142 MMOL/L (ref 132–146)

## 2018-04-24 PROCEDURE — 71045 X-RAY EXAM CHEST 1 VIEW: CPT

## 2018-04-24 PROCEDURE — 82805 BLOOD GASES W/O2 SATURATION: CPT | Performed by: INTERNAL MEDICINE

## 2018-04-24 PROCEDURE — 94799 UNLISTED PULMONARY SVC/PX: CPT

## 2018-04-24 PROCEDURE — 94640 AIRWAY INHALATION TREATMENT: CPT

## 2018-04-24 PROCEDURE — 99233 SBSQ HOSP IP/OBS HIGH 50: CPT | Performed by: HOSPITALIST

## 2018-04-24 PROCEDURE — 94010 BREATHING CAPACITY TEST: CPT

## 2018-04-24 PROCEDURE — 85610 PROTHROMBIN TIME: CPT | Performed by: INTERNAL MEDICINE

## 2018-04-24 PROCEDURE — 82962 GLUCOSE BLOOD TEST: CPT

## 2018-04-24 PROCEDURE — 36600 WITHDRAWAL OF ARTERIAL BLOOD: CPT | Performed by: INTERNAL MEDICINE

## 2018-04-24 PROCEDURE — 63710000001 DIPHENHYDRAMINE PER 50 MG: Performed by: NURSE PRACTITIONER

## 2018-04-24 PROCEDURE — 80048 BASIC METABOLIC PNL TOTAL CA: CPT | Performed by: HOSPITALIST

## 2018-04-24 PROCEDURE — 25010000002 DIPHENHYDRAMINE PER 50 MG: Performed by: NURSE PRACTITIONER

## 2018-04-24 PROCEDURE — 94010 BREATHING CAPACITY TEST: CPT | Performed by: INTERNAL MEDICINE

## 2018-04-24 PROCEDURE — 85730 THROMBOPLASTIN TIME PARTIAL: CPT | Performed by: INTERNAL MEDICINE

## 2018-04-24 RX ORDER — DIPHENHYDRAMINE HYDROCHLORIDE 50 MG/ML
12.5 INJECTION INTRAMUSCULAR; INTRAVENOUS ONCE
Status: COMPLETED | OUTPATIENT
Start: 2018-04-24 | End: 2018-04-24

## 2018-04-24 RX ORDER — ATORVASTATIN CALCIUM 40 MG/1
40 TABLET, FILM COATED ORAL NIGHTLY
Status: DISCONTINUED | OUTPATIENT
Start: 2018-04-24 | End: 2018-04-25 | Stop reason: HOSPADM

## 2018-04-24 RX ORDER — HYDROXYZINE HYDROCHLORIDE 25 MG/1
25 TABLET, FILM COATED ORAL 3 TIMES DAILY PRN
Status: DISCONTINUED | OUTPATIENT
Start: 2018-04-24 | End: 2018-04-24

## 2018-04-24 RX ORDER — DIPHENHYDRAMINE HCL 25 MG
25 CAPSULE ORAL EVERY 6 HOURS PRN
Status: DISCONTINUED | OUTPATIENT
Start: 2018-04-24 | End: 2018-04-25 | Stop reason: HOSPADM

## 2018-04-24 RX ADMIN — CARVEDILOL 12.5 MG: 12.5 TABLET, FILM COATED ORAL at 17:51

## 2018-04-24 RX ADMIN — IPRATROPIUM BROMIDE AND ALBUTEROL SULFATE 3 ML: 2.5; .5 SOLUTION RESPIRATORY (INHALATION) at 08:19

## 2018-04-24 RX ADMIN — IPRATROPIUM BROMIDE AND ALBUTEROL SULFATE 3 ML: 2.5; .5 SOLUTION RESPIRATORY (INHALATION) at 15:59

## 2018-04-24 RX ADMIN — ATORVASTATIN CALCIUM 40 MG: 40 TABLET, FILM COATED ORAL at 20:48

## 2018-04-24 RX ADMIN — CARVEDILOL 12.5 MG: 12.5 TABLET, FILM COATED ORAL at 08:32

## 2018-04-24 RX ADMIN — IPRATROPIUM BROMIDE AND ALBUTEROL SULFATE 3 ML: 2.5; .5 SOLUTION RESPIRATORY (INHALATION) at 12:41

## 2018-04-24 RX ADMIN — DIPHENHYDRAMINE HYDROCHLORIDE 12.5 MG: 50 INJECTION INTRAMUSCULAR; INTRAVENOUS at 02:02

## 2018-04-24 RX ADMIN — IPRATROPIUM BROMIDE AND ALBUTEROL SULFATE 3 ML: 2.5; .5 SOLUTION RESPIRATORY (INHALATION) at 19:31

## 2018-04-25 ENCOUNTER — TRANSCRIBE ORDERS (OUTPATIENT)
Dept: PULMONOLOGY | Facility: CLINIC | Age: 63
End: 2018-04-25

## 2018-04-25 ENCOUNTER — APPOINTMENT (OUTPATIENT)
Dept: MAMMOGRAPHY | Facility: HOSPITAL | Age: 63
End: 2018-04-25

## 2018-04-25 ENCOUNTER — DOCUMENTATION (OUTPATIENT)
Dept: OTHER | Facility: HOSPITAL | Age: 63
End: 2018-04-25

## 2018-04-25 VITALS
BODY MASS INDEX: 30.12 KG/M2 | WEIGHT: 170 LBS | OXYGEN SATURATION: 94 % | DIASTOLIC BLOOD PRESSURE: 80 MMHG | HEART RATE: 80 BPM | SYSTOLIC BLOOD PRESSURE: 115 MMHG | RESPIRATION RATE: 16 BRPM | TEMPERATURE: 98 F | HEIGHT: 63 IN

## 2018-04-25 LAB
ANION GAP SERPL CALCULATED.3IONS-SCNC: 5 MMOL/L (ref 3–11)
BACTERIA SPEC RESP CULT: NORMAL
BACTERIA SPEC RESP CULT: NORMAL
BASOPHILS # BLD AUTO: 0.05 10*3/MM3 (ref 0–0.2)
BASOPHILS NFR BLD AUTO: 0.5 % (ref 0–1)
BUN BLD-MCNC: 14 MG/DL (ref 9–23)
BUN/CREAT SERPL: 20 (ref 7–25)
CALCIUM SPEC-SCNC: 9.3 MG/DL (ref 8.7–10.4)
CHLORIDE SERPL-SCNC: 111 MMOL/L (ref 99–109)
CO2 SERPL-SCNC: 26 MMOL/L (ref 20–31)
CREAT BLD-MCNC: 0.7 MG/DL (ref 0.6–1.3)
DEPRECATED RDW RBC AUTO: 48.2 FL (ref 37–54)
EOSINOPHIL # BLD AUTO: 1.29 10*3/MM3 (ref 0–0.3)
EOSINOPHIL NFR BLD AUTO: 12.1 % (ref 0–3)
ERYTHROCYTE [DISTWIDTH] IN BLOOD BY AUTOMATED COUNT: 14.1 % (ref 11.3–14.5)
GFR SERPL CREATININE-BSD FRML MDRD: 84 ML/MIN/1.73
GLUCOSE BLD-MCNC: 84 MG/DL (ref 70–100)
GLUCOSE BLDC GLUCOMTR-MCNC: 105 MG/DL (ref 70–130)
GLUCOSE BLDC GLUCOMTR-MCNC: 92 MG/DL (ref 70–130)
GRAM STN SPEC: NORMAL
HCT VFR BLD AUTO: 40 % (ref 34.5–44)
HGB BLD-MCNC: 13 G/DL (ref 11.5–15.5)
IMM GRANULOCYTES # BLD: 0.03 10*3/MM3 (ref 0–0.03)
IMM GRANULOCYTES NFR BLD: 0.3 % (ref 0–0.6)
LYMPHOCYTES # BLD AUTO: 3.61 10*3/MM3 (ref 0.6–4.8)
LYMPHOCYTES NFR BLD AUTO: 34 % (ref 24–44)
MCH RBC QN AUTO: 30.1 PG (ref 27–31)
MCHC RBC AUTO-ENTMCNC: 32.5 G/DL (ref 32–36)
MCV RBC AUTO: 92.6 FL (ref 80–99)
MONOCYTES # BLD AUTO: 0.58 10*3/MM3 (ref 0–1)
MONOCYTES NFR BLD AUTO: 5.5 % (ref 0–12)
NEUTROPHILS # BLD AUTO: 5.09 10*3/MM3 (ref 1.5–8.3)
NEUTROPHILS NFR BLD AUTO: 47.9 % (ref 41–71)
PLATELET # BLD AUTO: 239 10*3/MM3 (ref 150–450)
PMV BLD AUTO: 10.3 FL (ref 6–12)
POTASSIUM BLD-SCNC: 4 MMOL/L (ref 3.5–5.5)
RBC # BLD AUTO: 4.32 10*6/MM3 (ref 3.89–5.14)
SODIUM BLD-SCNC: 142 MMOL/L (ref 132–146)
WBC NRBC COR # BLD: 10.62 10*3/MM3 (ref 3.5–10.8)

## 2018-04-25 PROCEDURE — 99233 SBSQ HOSP IP/OBS HIGH 50: CPT | Performed by: INTERNAL MEDICINE

## 2018-04-25 PROCEDURE — 99233 SBSQ HOSP IP/OBS HIGH 50: CPT | Performed by: HOSPITALIST

## 2018-04-25 PROCEDURE — 94799 UNLISTED PULMONARY SVC/PX: CPT

## 2018-04-25 PROCEDURE — 85025 COMPLETE CBC W/AUTO DIFF WBC: CPT | Performed by: HOSPITALIST

## 2018-04-25 PROCEDURE — 99239 HOSP IP/OBS DSCHRG MGMT >30: CPT | Performed by: HOSPITALIST

## 2018-04-25 PROCEDURE — 82962 GLUCOSE BLOOD TEST: CPT

## 2018-04-25 PROCEDURE — 80048 BASIC METABOLIC PNL TOTAL CA: CPT | Performed by: HOSPITALIST

## 2018-04-25 PROCEDURE — 94640 AIRWAY INHALATION TREATMENT: CPT

## 2018-04-25 RX ORDER — ALBUTEROL SULFATE 90 UG/1
2 AEROSOL, METERED RESPIRATORY (INHALATION) EVERY 4 HOURS PRN
Qty: 1 INHALER | Refills: 2 | Status: SHIPPED | OUTPATIENT
Start: 2018-04-25 | End: 2018-07-24 | Stop reason: HOSPADM

## 2018-04-25 RX ADMIN — CARVEDILOL 12.5 MG: 12.5 TABLET, FILM COATED ORAL at 08:25

## 2018-04-25 RX ADMIN — IPRATROPIUM BROMIDE AND ALBUTEROL SULFATE 3 ML: 2.5; .5 SOLUTION RESPIRATORY (INHALATION) at 16:08

## 2018-04-25 RX ADMIN — IPRATROPIUM BROMIDE AND ALBUTEROL SULFATE 3 ML: 2.5; .5 SOLUTION RESPIRATORY (INHALATION) at 11:59

## 2018-04-25 RX ADMIN — IPRATROPIUM BROMIDE AND ALBUTEROL SULFATE 3 ML: 2.5; .5 SOLUTION RESPIRATORY (INHALATION) at 08:01

## 2018-04-25 NOTE — PROGRESS NOTES
Met with patient while inpatient today per Dr. Stringer request. Patient reports admission due to difficulty breathing. CT chest during admission revealed RLL nodule which was concerning. Plan is for patient to have kya bronch/EBUS outpatient next week with Dr. Boudreaux. Talked with patient about new findings and plan. She v/u and agreeable to plan. Introduced lung navigator role and provided contact information. Requests navigator to discuss plan with family member, Sole. Will await her phone call. Navigator to help communicate with pulmonary office to have PAT and procedure scheduled. She knows to call with questions or concerns.

## 2018-04-26 ENCOUNTER — TELEPHONE (OUTPATIENT)
Dept: INTERNAL MEDICINE | Facility: CLINIC | Age: 63
End: 2018-04-26

## 2018-04-26 ENCOUNTER — TRANSITIONAL CARE MANAGEMENT TELEPHONE ENCOUNTER (OUTPATIENT)
Dept: INTERNAL MEDICINE | Facility: CLINIC | Age: 63
End: 2018-04-26

## 2018-04-26 NOTE — TELEPHONE ENCOUNTER
We will need to do these separately, but can discuss further at hospital discharge follow up visit.

## 2018-04-26 NOTE — TELEPHONE ENCOUNTER
----- Message from Eva Cabrera V sent at 4/25/2018  4:29 PM EDT -----  PT WAS WONDERING IF SHE CAN DO HER HOSP F/U AND PAP W/PHYSICAL ON SAME DAY.    HOSP F/U IS ON 5/8 AND PHY W/PAP IS ON 8/10    SHE CAN BE REACHED [phone]

## 2018-04-27 LAB
BACTERIA SPEC AEROBE CULT: NORMAL
BACTERIA SPEC AEROBE CULT: NORMAL

## 2018-04-29 DIAGNOSIS — I10 ESSENTIAL HYPERTENSION: ICD-10-CM

## 2018-04-30 ENCOUNTER — TELEPHONE (OUTPATIENT)
Dept: OTHER | Facility: HOSPITAL | Age: 63
End: 2018-04-30

## 2018-04-30 ENCOUNTER — TRANSCRIBE ORDERS (OUTPATIENT)
Dept: MAMMOGRAPHY | Facility: HOSPITAL | Age: 63
End: 2018-04-30

## 2018-04-30 DIAGNOSIS — R92.8 ABNORMAL MAMMOGRAM: Primary | ICD-10-CM

## 2018-04-30 RX ORDER — CLOPIDOGREL BISULFATE 75 MG/1
TABLET ORAL
Qty: 30 TABLET | Refills: 0 | OUTPATIENT
Start: 2018-04-30

## 2018-04-30 RX ORDER — ATORVASTATIN CALCIUM 20 MG/1
20 TABLET, FILM COATED ORAL NIGHTLY
Qty: 90 TABLET | Refills: 0 | Status: SHIPPED | OUTPATIENT
Start: 2018-04-30 | End: 2018-07-26 | Stop reason: SDUPTHER

## 2018-04-30 RX ORDER — CARVEDILOL 12.5 MG/1
TABLET ORAL
Qty: 60 TABLET | Refills: 0 | Status: SHIPPED | OUTPATIENT
Start: 2018-04-30 | End: 2018-05-27 | Stop reason: SDUPTHER

## 2018-04-30 RX ORDER — AMLODIPINE BESYLATE 10 MG/1
TABLET ORAL
Qty: 30 TABLET | Refills: 0 | Status: SHIPPED | OUTPATIENT
Start: 2018-04-30 | End: 2018-05-27 | Stop reason: SDUPTHER

## 2018-04-30 NOTE — TELEPHONE ENCOUNTER
Called patient to verify she is aware of appointments this week and she has been holding anticoagulant. She v/u to all and was encouraged to call with questions or concerns.

## 2018-05-01 ENCOUNTER — TELEPHONE (OUTPATIENT)
Dept: INTERNAL MEDICINE | Facility: CLINIC | Age: 63
End: 2018-05-01

## 2018-05-01 NOTE — TELEPHONE ENCOUNTER
----- Message from Hillary Zuleta sent at 5/1/2018  3:25 PM EDT -----  Contact: SELF  KERA BARNETT CALLING TO SEE IF SHE CAN GET A LETTER FOR HANDICAP PARKING. SHE CAN BE REACHED [phone]

## 2018-05-02 DIAGNOSIS — Z12.11 SCREENING FOR COLON CANCER: Primary | ICD-10-CM

## 2018-05-03 ENCOUNTER — HOSPITAL ENCOUNTER (OUTPATIENT)
Dept: ULTRASOUND IMAGING | Facility: HOSPITAL | Age: 63
Discharge: HOME | End: 2018-05-03

## 2018-05-03 ENCOUNTER — APPOINTMENT (OUTPATIENT)
Dept: ULTRASOUND IMAGING | Facility: HOSPITAL | Age: 63
End: 2018-05-03

## 2018-05-03 ENCOUNTER — HOSPITAL ENCOUNTER (OUTPATIENT)
Dept: MAMMOGRAPHY | Facility: HOSPITAL | Age: 63
Discharge: HOME | End: 2018-05-03

## 2018-05-03 ENCOUNTER — HOSPITAL ENCOUNTER (OUTPATIENT)
Dept: ULTRASOUND IMAGING | Facility: HOSPITAL | Age: 63
Discharge: HOME | End: 2018-05-03
Admitting: NURSE PRACTITIONER

## 2018-05-03 ENCOUNTER — APPOINTMENT (OUTPATIENT)
Dept: PREADMISSION TESTING | Facility: HOSPITAL | Age: 63
End: 2018-05-03

## 2018-05-03 DIAGNOSIS — R92.8 ABNORMAL MAMMOGRAM: ICD-10-CM

## 2018-05-03 PROCEDURE — A4648 IMPLANTABLE TISSUE MARKER: HCPCS

## 2018-05-03 PROCEDURE — 88305 TISSUE EXAM BY PATHOLOGIST: CPT | Performed by: NURSE PRACTITIONER

## 2018-05-03 PROCEDURE — 76942 ECHO GUIDE FOR BIOPSY: CPT | Performed by: RADIOLOGY

## 2018-05-03 PROCEDURE — 76942 ECHO GUIDE FOR BIOPSY: CPT

## 2018-05-03 PROCEDURE — 77065 DX MAMMO INCL CAD UNI: CPT | Performed by: RADIOLOGY

## 2018-05-03 PROCEDURE — 88360 TUMOR IMMUNOHISTOCHEM/MANUAL: CPT | Performed by: NURSE PRACTITIONER

## 2018-05-03 PROCEDURE — 88173 CYTOPATH EVAL FNA REPORT: CPT | Performed by: NURSE PRACTITIONER

## 2018-05-03 PROCEDURE — 19084 BX BREAST ADD LESION US IMAG: CPT | Performed by: RADIOLOGY

## 2018-05-03 PROCEDURE — 19083 BX BREAST 1ST LESION US IMAG: CPT | Performed by: RADIOLOGY

## 2018-05-03 PROCEDURE — 11100 PR BIOPSY OF SKIN LESION: CPT | Performed by: RADIOLOGY

## 2018-05-03 PROCEDURE — 10022 US GUIDED BREAST BIOPSY W WO DEVICE EACH ADDITIONAL: CPT | Performed by: RADIOLOGY

## 2018-05-03 RX ORDER — LIDOCAINE HYDROCHLORIDE AND EPINEPHRINE 10; 10 MG/ML; UG/ML
10 INJECTION, SOLUTION INFILTRATION; PERINEURAL ONCE
Status: COMPLETED | OUTPATIENT
Start: 2018-05-03 | End: 2018-05-03

## 2018-05-03 RX ORDER — LIDOCAINE HYDROCHLORIDE 10 MG/ML
5 INJECTION, SOLUTION INFILTRATION; PERINEURAL ONCE
Status: COMPLETED | OUTPATIENT
Start: 2018-05-03 | End: 2018-05-03

## 2018-05-03 RX ADMIN — LIDOCAINE HYDROCHLORIDE,EPINEPHRINE BITARTRATE 9 ML: 10; .01 INJECTION, SOLUTION INFILTRATION; PERINEURAL at 11:35

## 2018-05-03 RX ADMIN — LIDOCAINE HYDROCHLORIDE,EPINEPHRINE BITARTRATE 3 ML: 10; .01 INJECTION, SOLUTION INFILTRATION; PERINEURAL at 11:30

## 2018-05-03 RX ADMIN — LIDOCAINE HYDROCHLORIDE 1 ML: 10 INJECTION, SOLUTION INFILTRATION; PERINEURAL at 11:29

## 2018-05-03 RX ADMIN — LIDOCAINE HYDROCHLORIDE 1 ML: 10 INJECTION, SOLUTION INFILTRATION; PERINEURAL at 11:16

## 2018-05-03 RX ADMIN — LIDOCAINE HYDROCHLORIDE 1 ML: 10 INJECTION, SOLUTION INFILTRATION; PERINEURAL at 11:05

## 2018-05-03 RX ADMIN — LIDOCAINE HYDROCHLORIDE,EPINEPHRINE BITARTRATE 2 ML: 10; .01 INJECTION, SOLUTION INFILTRATION; PERINEURAL at 11:17

## 2018-05-03 RX ADMIN — LIDOCAINE HYDROCHLORIDE,EPINEPHRINE BITARTRATE 3 ML: 10; .01 INJECTION, SOLUTION INFILTRATION; PERINEURAL at 11:11

## 2018-05-03 RX ADMIN — LIDOCAINE HYDROCHLORIDE 2 ML: 10 INJECTION, SOLUTION INFILTRATION; PERINEURAL at 11:34

## 2018-05-03 RX ADMIN — LIDOCAINE HYDROCHLORIDE 1 ML: 10 INJECTION, SOLUTION INFILTRATION; PERINEURAL at 11:22

## 2018-05-03 RX ADMIN — LIDOCAINE HYDROCHLORIDE 1 ML: 10 INJECTION, SOLUTION INFILTRATION; PERINEURAL at 11:11

## 2018-05-03 NOTE — PROGRESS NOTES
Transitional Care Follow Up Visit  Subjective     Betsy Chi is a 62 y.o. female who presents for a transitional care management visit.    Within 48 business hours after discharge our office contacted her via telephone to coordinate her care and needs.      I reviewed and discussed the details of that call along with the discharge summary, hospital problems, inpatient lab results, inpatient diagnostic studies, and consultation reports with Betsy.   Patient reports bilateral breast biopsies 5/3/2018- 3 bipopsies on each breast  Current outpatient and discharge medications have been reconciled for the patient.    Date of TCM Phone Call 4/26/2018   [facility]   Date of Admission 4/23/2018   Date of Discharge 4/25/2018   Discharge Disposition Home or Self Care     Risk for Readmission (LACE) Score: 12 (4/25/2018  6:00 AM)    History of Present Illness   Course During Hospital Stay:  Reviewed records. Acute on chronic hypoxemic respiratory failure not requiring ventilatory support/acute exacerbation of COPD. Patient reports she now has oxygen at home and has been using intermittently.     The following portions of the patient's history were reviewed and updated as appropriate: allergies, current medications, past family history, past medical history, past social history, past surgical history and problem list.    Review of Systems   Constitutional: Negative for chills, fatigue and fever.   HENT: Negative for congestion, dental problem, mouth sores, nosebleeds, postnasal drip, rhinorrhea, sinus pain, sinus pressure, sneezing and sore throat.    Eyes: Negative for pain, discharge, redness and itching.        States she has had blurred vision for a long time and needs to schedule an eye appointment   Respiratory: Positive for shortness of breath and wheezing. Negative for cough.         Has bronchoscopy scheduled on 5/9/2018, suspicious RLL mass found on CT   Cardiovascular: Negative for chest  pain, palpitations and leg swelling.   Gastrointestinal: Negative for abdominal distention, abdominal pain, blood in stool, diarrhea, nausea and vomiting.        Colonoscopy 5/8/2018   Endocrine: Negative for cold intolerance, heat intolerance, polydipsia and polyuria.        T2DM checking daily    Genitourinary: Positive for frequency. Negative for difficulty urinating, dysuria, hematuria and urgency.        Nocturia   Musculoskeletal: Positive for arthralgias. Negative for gait problem, joint swelling and myalgias.        RA, osteoarthritis and psoriatic arthritis   Skin: Negative for color change, rash and wound.        Pustular psoriasis has improved recently   Allergic/Immunologic: Positive for environmental allergies and food allergies.        Milk intolerance   Neurological: Negative for dizziness, syncope, weakness, light-headedness, numbness and headaches.   Hematological: Does not bruise/bleed easily.   Psychiatric/Behavioral:        Memory issues at times       Objective   Physical Exam   Constitutional: She is oriented to person, place, and time. She appears well-developed and well-nourished. She is cooperative. She is easily aroused.  Non-toxic appearance. She does not have a sickly appearance. She does not appear ill. No distress.   HENT:   Head: Normocephalic and atraumatic. Head is without abrasion. Hair is normal.   Right Ear: Hearing, tympanic membrane, external ear and ear canal normal. No foreign bodies. Tympanic membrane is not perforated and not erythematous.   Left Ear: Hearing, tympanic membrane, external ear and ear canal normal. No foreign bodies. Tympanic membrane is not perforated and not erythematous.   Nose: Nose normal. No mucosal edema, rhinorrhea or septal deviation. No epistaxis.  No foreign bodies.   Mouth/Throat: Oropharynx is clear and moist. No oral lesions. Normal dentition.   Eyes: Lids are normal. Pupils are equal, round, and reactive to light. Right eye exhibits no  discharge. Left eye exhibits no discharge. Right conjunctiva is not injected. Left conjunctiva is not injected. No scleral icterus.   Neck: Normal range of motion and full passive range of motion without pain. Neck supple. No edema and normal range of motion present. No thyroid mass and no thyromegaly present.   Cardiovascular: Normal rate, regular rhythm and normal heart sounds.  Exam reveals no gallop and no friction rub.    No murmur heard.  Pulmonary/Chest: Effort normal and breath sounds normal. No accessory muscle usage. She has no rhonchi. She has no rales. She exhibits no tenderness.   Abdominal: Soft. Bowel sounds are normal. She exhibits no distension. There is no hepatosplenomegaly or hepatomegaly. There is no tenderness. There is no CVA tenderness.   Musculoskeletal: Normal range of motion. She exhibits no edema, tenderness or deformity.    Diabetic foot exam performed: see scanned document.   During the foot exam she had a monofilament test performed.    Neurological Sensory Findings - Unaltered hot/cold right ankle/foot discrimination and unaltered hot/cold left ankle/foot discrimination. Unaltered sharp/dull right ankle/foot discrimination and unaltered sharp/dull left ankle/foot discrimination. No right ankle/foot altered proprioception and no left ankle/foot altered proprioception  Vascular Status -  Her right foot exhibits normal foot vasculature  and no edema. Her left foot exhibits normal foot vasculature  and no edema.  Skin Integrity  -  Her right foot skin is intact.Her left foot skin is intact..   Foot Structure and Biomechanics -  Her right foot has no hallux valgus, no pes cavus, no claw toes, no hammer toes and no cavovarus present. Her left foot has no hallux valgus, no pes cavus, no claw toes, no hammer toes and no cavovarus.  Lymphadenopathy:     She has no cervical adenopathy.   Neurological: She is alert, oriented to person, place, and time and easily aroused. She has normal reflexes.  No cranial nerve deficit. Coordination normal.   Muscle strength 5/5 and equal throughout.   Skin: Skin is warm and dry. No abrasion and no rash noted. She is not diaphoretic. No cyanosis or erythema. Nails show no clubbing.   Steri strips on bilateral breasts x 3 each. Dry areas on palms, wrists and feet.   Psychiatric: She has a normal mood and affect. Her speech is normal and behavior is normal.   Nursing note and vitals reviewed.      Assessment/Plan   Betsy was seen today for hospital follow up.    Diagnoses and all orders for this visit:    Acute on chronic respiratory failure with hypoxia  -     Basic metabolic panel; Future    Urinary frequency  -     POC Urinalysis Dipstick    Post-menopausal  -     DEXA Bone Density Axial; Future    Type 2 diabetes mellitus with other specified complication, without long-term current use of insulin  -     Ambulatory Referral for Diabetic Eye Exam-Ophthalmology    Leukocytes in urine  -     Urine Culture - Urine, Urine, Clean Catch    Fibromyalgia    Dyslipidemia    Mixed anxiety depressive disorder    Rheumatoid arthritis involving multiple sites, unspecified rheumatoid factor presence    Cerebral infarction, unspecified mechanism                         albuterol (PROVENTIL HFA;VENTOLIN HFA) 108 (90 Base) MCG/ACT inhaler  Inhale 2 puffs Every 4 (Four) Hours As Needed for Wheezing.           aluminum acetate (DOMEBORO) pack packet  Apply  topically 3 (Three) Times a Day As Needed (psoriatic arthritis).                  amLODIPine (NORVASC) 10 MG tablet  TAKE ONE TABLET BY MOUTH DAILY                   carvedilol (COREG) 12.5 MG tablet  Take 1 tablet by mouth 2 (Two) Times a Day With Meals.                   Cranberry 600 MG tablet  Take  by mouth.                   diphenhydrAMINE (BENADRYL) 25 MG tablet  Take  by mouth.                   glucose blood test strip  Use as instructed                   glucose monitor monitoring kit  1 each 3 (Three) Times a Day As Needed  (hypo/hyperglycemia).                   pantoprazole (PROTONIX) 40 MG EC tablet  Take 1 tablet by mouth Daily.               Transitional Care Management Certification  I certify that the following are true:  1. Communication was made within 2 business days of discharge.  2. Complexity of Medical Decision Making is moderate.  3. Face to face visit occurred within 12 days.    *Note: 89025 is for high complexity patients with a face to face visit within 7 days of discharge.  61492 is for high complexity patients with a face to face on days 8-14 post discharge or medium complexity with face to face visit within 14 days post discharge.         Return for physical on 5/10/2018

## 2018-05-04 ENCOUNTER — ANESTHESIA (OUTPATIENT)
Dept: GASTROENTEROLOGY | Facility: HOSPITAL | Age: 63
End: 2018-05-04

## 2018-05-04 ENCOUNTER — ANESTHESIA EVENT (OUTPATIENT)
Dept: GASTROENTEROLOGY | Facility: HOSPITAL | Age: 63
End: 2018-05-04

## 2018-05-04 ENCOUNTER — TELEPHONE (OUTPATIENT)
Dept: OTHER | Facility: HOSPITAL | Age: 63
End: 2018-05-04

## 2018-05-04 NOTE — TELEPHONE ENCOUNTER
Called patient's emergency contact regarding new bronchoscopy date and time. Notified Johanna of bronch on 05/09/2018 at 1000 with 0830 arrival time. Instructed her patient will need to hold Plavix until after procedure as well. She v/u. Attempted to call patient and reached , will attempt again on Monday.

## 2018-05-05 LAB
LAB AP CASE REPORT: NORMAL
Lab: NORMAL
PATH REPORT.FINAL DX SPEC: NORMAL

## 2018-05-07 ENCOUNTER — OFFICE VISIT (OUTPATIENT)
Dept: INTERNAL MEDICINE | Facility: CLINIC | Age: 63
End: 2018-05-07

## 2018-05-07 VITALS
WEIGHT: 168 LBS | HEIGHT: 63 IN | TEMPERATURE: 97.6 F | BODY MASS INDEX: 29.77 KG/M2 | OXYGEN SATURATION: 99 % | RESPIRATION RATE: 16 BRPM | SYSTOLIC BLOOD PRESSURE: 110 MMHG | DIASTOLIC BLOOD PRESSURE: 78 MMHG | HEART RATE: 69 BPM

## 2018-05-07 DIAGNOSIS — F41.8 MIXED ANXIETY DEPRESSIVE DISORDER: ICD-10-CM

## 2018-05-07 DIAGNOSIS — E78.5 DYSLIPIDEMIA: ICD-10-CM

## 2018-05-07 DIAGNOSIS — Z09 HOSPITAL DISCHARGE FOLLOW-UP: Primary | ICD-10-CM

## 2018-05-07 DIAGNOSIS — C80.1 ADENOCARCINOMA (HCC): Primary | ICD-10-CM

## 2018-05-07 DIAGNOSIS — R35.0 URINARY FREQUENCY: ICD-10-CM

## 2018-05-07 DIAGNOSIS — M06.9 RHEUMATOID ARTHRITIS INVOLVING MULTIPLE SITES, UNSPECIFIED RHEUMATOID FACTOR PRESENCE: ICD-10-CM

## 2018-05-07 DIAGNOSIS — Z78.0 POST-MENOPAUSAL: ICD-10-CM

## 2018-05-07 DIAGNOSIS — E11.69 TYPE 2 DIABETES MELLITUS WITH OTHER SPECIFIED COMPLICATION, WITHOUT LONG-TERM CURRENT USE OF INSULIN (HCC): ICD-10-CM

## 2018-05-07 DIAGNOSIS — R82.998 LEUKOCYTES IN URINE: ICD-10-CM

## 2018-05-07 DIAGNOSIS — M79.7 FIBROMYALGIA: ICD-10-CM

## 2018-05-07 DIAGNOSIS — I63.9 CEREBRAL INFARCTION, UNSPECIFIED MECHANISM (HCC): ICD-10-CM

## 2018-05-07 DIAGNOSIS — J96.21 ACUTE ON CHRONIC RESPIRATORY FAILURE WITH HYPOXIA (HCC): ICD-10-CM

## 2018-05-07 PROBLEM — C50.912 INVASIVE DUCTAL CARCINOMA OF BREAST, LEFT (HCC): Status: ACTIVE | Noted: 2018-05-07

## 2018-05-07 LAB
BILIRUB BLD-MCNC: ABNORMAL MG/DL
CLARITY, POC: ABNORMAL
COLOR UR: YELLOW
CYTO UR: NORMAL
EXPIRATION DATE: ABNORMAL
GLUCOSE UR STRIP-MCNC: NEGATIVE MG/DL
KETONES UR QL: NEGATIVE
LAB AP CASE REPORT: NORMAL
LAB AP CLINICAL INFORMATION: NORMAL
LAB AP DIAGNOSIS COMMENT: NORMAL
LEUKOCYTE EST, POC: ABNORMAL
Lab: ABNORMAL
Lab: NORMAL
NITRITE UR-MCNC: NEGATIVE MG/ML
PATH REPORT.ADDENDUM SPEC: NORMAL
PATH REPORT.FINAL DX SPEC: NORMAL
PATH REPORT.GROSS SPEC: NORMAL
PH UR: 5 [PH] (ref 5–8)
PROT UR STRIP-MCNC: NEGATIVE MG/DL
RBC # UR STRIP: NEGATIVE /UL
SP GR UR: 1.01 (ref 1–1.03)
UROBILINOGEN UR QL: ABNORMAL

## 2018-05-07 PROCEDURE — 81002 URINALYSIS NONAUTO W/O SCOPE: CPT | Performed by: NURSE PRACTITIONER

## 2018-05-07 PROCEDURE — 99495 TRANSJ CARE MGMT MOD F2F 14D: CPT | Performed by: NURSE PRACTITIONER

## 2018-05-07 PROCEDURE — 87086 URINE CULTURE/COLONY COUNT: CPT | Performed by: NURSE PRACTITIONER

## 2018-05-07 PROCEDURE — 87147 CULTURE TYPE IMMUNOLOGIC: CPT | Performed by: NURSE PRACTITIONER

## 2018-05-08 ENCOUNTER — OUTSIDE FACILITY SERVICE (OUTPATIENT)
Dept: GASTROENTEROLOGY | Facility: CLINIC | Age: 63
End: 2018-05-08

## 2018-05-08 ENCOUNTER — LAB REQUISITION (OUTPATIENT)
Dept: LAB | Facility: HOSPITAL | Age: 63
End: 2018-05-08

## 2018-05-08 DIAGNOSIS — Z80.0 FAMILY HISTORY OF MALIGNANT NEOPLASM OF DIGESTIVE ORGAN: ICD-10-CM

## 2018-05-08 DIAGNOSIS — D12.5 BENIGN NEOPLASM OF SIGMOID COLON: ICD-10-CM

## 2018-05-08 DIAGNOSIS — D12.0 BENIGN NEOPLASM OF CECUM: ICD-10-CM

## 2018-05-08 DIAGNOSIS — Z12.11 ENCOUNTER FOR SCREENING FOR MALIGNANT NEOPLASM OF COLON: ICD-10-CM

## 2018-05-08 DIAGNOSIS — K57.30 DIVERTICULOSIS OF LARGE INTESTINE WITHOUT PERFORATION OR ABSCESS WITHOUT BLEEDING: ICD-10-CM

## 2018-05-08 LAB
BACTERIA SPEC AEROBE CULT: ABNORMAL
BACTERIA SPEC AEROBE CULT: ABNORMAL
STREP GROUPING: ABNORMAL

## 2018-05-08 PROCEDURE — 45385 COLONOSCOPY W/LESION REMOVAL: CPT | Performed by: INTERNAL MEDICINE

## 2018-05-08 PROCEDURE — G0500 MOD SEDAT ENDO SERVICE >5YRS: HCPCS | Performed by: INTERNAL MEDICINE

## 2018-05-08 PROCEDURE — 88305 TISSUE EXAM BY PATHOLOGIST: CPT | Performed by: INTERNAL MEDICINE

## 2018-05-08 RX ORDER — CLINDAMYCIN HYDROCHLORIDE 300 MG/1
300 CAPSULE ORAL 2 TIMES DAILY
Qty: 14 CAPSULE | Refills: 0 | Status: SHIPPED | OUTPATIENT
Start: 2018-05-08 | End: 2018-05-30

## 2018-05-09 ENCOUNTER — APPOINTMENT (OUTPATIENT)
Dept: GENERAL RADIOLOGY | Facility: HOSPITAL | Age: 63
End: 2018-05-09

## 2018-05-09 ENCOUNTER — HOSPITAL ENCOUNTER (OUTPATIENT)
Facility: HOSPITAL | Age: 63
Setting detail: HOSPITAL OUTPATIENT SURGERY
Discharge: HOME | End: 2018-05-09
Attending: INTERNAL MEDICINE | Admitting: INTERNAL MEDICINE

## 2018-05-09 VITALS
TEMPERATURE: 97.5 F | HEART RATE: 62 BPM | SYSTOLIC BLOOD PRESSURE: 118 MMHG | OXYGEN SATURATION: 95 % | RESPIRATION RATE: 16 BRPM | DIASTOLIC BLOOD PRESSURE: 72 MMHG

## 2018-05-09 DIAGNOSIS — R59.9 ADENOPATHY: ICD-10-CM

## 2018-05-09 DIAGNOSIS — R91.1 LUNG NODULE: ICD-10-CM

## 2018-05-09 LAB
ANION GAP SERPL CALCULATED.3IONS-SCNC: 6 MMOL/L (ref 3–11)
BUN BLD-MCNC: 15 MG/DL (ref 9–23)
BUN/CREAT SERPL: 18.8 (ref 7–25)
CALCIUM SPEC-SCNC: 9 MG/DL (ref 8.7–10.4)
CHLORIDE SERPL-SCNC: 105 MMOL/L (ref 99–109)
CO2 SERPL-SCNC: 27 MMOL/L (ref 20–31)
CREAT BLD-MCNC: 0.8 MG/DL (ref 0.6–1.3)
CYTO UR: NORMAL
GFR SERPL CREATININE-BSD FRML MDRD: 72 ML/MIN/1.73
GLUCOSE BLD-MCNC: 97 MG/DL (ref 70–100)
GLUCOSE BLDC GLUCOMTR-MCNC: 101 MG/DL (ref 70–130)
LAB AP CASE REPORT: NORMAL
LAB AP CLINICAL INFORMATION: NORMAL
Lab: NORMAL
PATH REPORT.FINAL DX SPEC: NORMAL
PATH REPORT.GROSS SPEC: NORMAL
POTASSIUM BLD-SCNC: 4.2 MMOL/L (ref 3.5–5.5)
SODIUM BLD-SCNC: 138 MMOL/L (ref 132–146)

## 2018-05-09 PROCEDURE — 63710000001 INSULIN LISPRO (HUMAN) PER 5 UNITS: Performed by: NURSE ANESTHETIST, CERTIFIED REGISTERED

## 2018-05-09 PROCEDURE — 87206 SMEAR FLUORESCENT/ACID STAI: CPT | Performed by: INTERNAL MEDICINE

## 2018-05-09 PROCEDURE — 88305 TISSUE EXAM BY PATHOLOGIST: CPT | Performed by: INTERNAL MEDICINE

## 2018-05-09 PROCEDURE — 87116 MYCOBACTERIA CULTURE: CPT | Performed by: INTERNAL MEDICINE

## 2018-05-09 PROCEDURE — 87254 VIRUS INOCULATION SHELL VIA: CPT | Performed by: INTERNAL MEDICINE

## 2018-05-09 PROCEDURE — 87205 SMEAR GRAM STAIN: CPT | Performed by: INTERNAL MEDICINE

## 2018-05-09 PROCEDURE — C1726 CATH, BAL DIL, NON-VASCULAR: HCPCS | Performed by: INTERNAL MEDICINE

## 2018-05-09 PROCEDURE — 31627 NAVIGATIONAL BRONCHOSCOPY: CPT | Performed by: INTERNAL MEDICINE

## 2018-05-09 PROCEDURE — 25010000002 PROPOFOL 10 MG/ML EMULSION: Performed by: NURSE ANESTHETIST, CERTIFIED REGISTERED

## 2018-05-09 PROCEDURE — 31628 BRONCHOSCOPY/LUNG BX EACH: CPT | Performed by: INTERNAL MEDICINE

## 2018-05-09 PROCEDURE — 87070 CULTURE OTHR SPECIMN AEROBIC: CPT | Performed by: INTERNAL MEDICINE

## 2018-05-09 PROCEDURE — 80048 BASIC METABOLIC PNL TOTAL CA: CPT | Performed by: ANESTHESIOLOGY

## 2018-05-09 PROCEDURE — 88112 CYTOPATH CELL ENHANCE TECH: CPT | Performed by: INTERNAL MEDICINE

## 2018-05-09 PROCEDURE — 25010000002 NEOSTIGMINE 10 MG/10ML SOLUTION: Performed by: NURSE ANESTHETIST, CERTIFIED REGISTERED

## 2018-05-09 PROCEDURE — 71045 X-RAY EXAM CHEST 1 VIEW: CPT

## 2018-05-09 PROCEDURE — 31654 BRONCH EBUS IVNTJ PERPH LES: CPT | Performed by: INTERNAL MEDICINE

## 2018-05-09 PROCEDURE — 25010000002 DEXAMETHASONE PER 1 MG: Performed by: NURSE ANESTHETIST, CERTIFIED REGISTERED

## 2018-05-09 PROCEDURE — 82962 GLUCOSE BLOOD TEST: CPT

## 2018-05-09 PROCEDURE — 87102 FUNGUS ISOLATION CULTURE: CPT | Performed by: INTERNAL MEDICINE

## 2018-05-09 PROCEDURE — 25010000002 ONDANSETRON PER 1 MG: Performed by: NURSE ANESTHETIST, CERTIFIED REGISTERED

## 2018-05-09 PROCEDURE — 76000 FLUOROSCOPY <1 HR PHYS/QHP: CPT

## 2018-05-09 PROCEDURE — 31652 BRONCH EBUS SAMPLNG 1/2 NODE: CPT | Performed by: INTERNAL MEDICINE

## 2018-05-09 RX ORDER — HYDRALAZINE HYDROCHLORIDE 20 MG/ML
5 INJECTION INTRAMUSCULAR; INTRAVENOUS
Status: DISCONTINUED | OUTPATIENT
Start: 2018-05-09 | End: 2018-05-09 | Stop reason: HOSPADM

## 2018-05-09 RX ORDER — PROPOFOL 10 MG/ML
VIAL (ML) INTRAVENOUS AS NEEDED
Status: DISCONTINUED | OUTPATIENT
Start: 2018-05-09 | End: 2018-05-09 | Stop reason: SURG

## 2018-05-09 RX ORDER — LABETALOL HYDROCHLORIDE 5 MG/ML
5 INJECTION, SOLUTION INTRAVENOUS
Status: DISCONTINUED | OUTPATIENT
Start: 2018-05-09 | End: 2018-05-09 | Stop reason: HOSPADM

## 2018-05-09 RX ORDER — LIDOCAINE HYDROCHLORIDE 10 MG/ML
0.5 INJECTION, SOLUTION EPIDURAL; INFILTRATION; INTRACAUDAL; PERINEURAL ONCE AS NEEDED
Status: CANCELLED | OUTPATIENT
Start: 2018-05-09

## 2018-05-09 RX ORDER — DEXAMETHASONE SODIUM PHOSPHATE 4 MG/ML
INJECTION, SOLUTION INTRA-ARTICULAR; INTRALESIONAL; INTRAMUSCULAR; INTRAVENOUS; SOFT TISSUE AS NEEDED
Status: DISCONTINUED | OUTPATIENT
Start: 2018-05-09 | End: 2018-05-09 | Stop reason: SURG

## 2018-05-09 RX ORDER — PROMETHAZINE HYDROCHLORIDE 25 MG/1
25 SUPPOSITORY RECTAL ONCE AS NEEDED
Status: DISCONTINUED | OUTPATIENT
Start: 2018-05-09 | End: 2018-05-09 | Stop reason: HOSPADM

## 2018-05-09 RX ORDER — ATRACURIUM BESYLATE 10 MG/ML
INJECTION, SOLUTION INTRAVENOUS AS NEEDED
Status: DISCONTINUED | OUTPATIENT
Start: 2018-05-09 | End: 2018-05-09 | Stop reason: SURG

## 2018-05-09 RX ORDER — GLYCOPYRROLATE 0.2 MG/ML
INJECTION INTRAMUSCULAR; INTRAVENOUS AS NEEDED
Status: DISCONTINUED | OUTPATIENT
Start: 2018-05-09 | End: 2018-05-09 | Stop reason: SURG

## 2018-05-09 RX ORDER — FAMOTIDINE 20 MG/1
20 TABLET, FILM COATED ORAL
Status: DISCONTINUED | OUTPATIENT
Start: 2018-05-09 | End: 2018-05-09 | Stop reason: HOSPADM

## 2018-05-09 RX ORDER — SODIUM CHLORIDE, SODIUM LACTATE, POTASSIUM CHLORIDE, CALCIUM CHLORIDE 600; 310; 30; 20 MG/100ML; MG/100ML; MG/100ML; MG/100ML
9 INJECTION, SOLUTION INTRAVENOUS CONTINUOUS PRN
Status: CANCELLED | OUTPATIENT
Start: 2018-05-09

## 2018-05-09 RX ORDER — PROMETHAZINE HYDROCHLORIDE 25 MG/ML
6.25 INJECTION, SOLUTION INTRAMUSCULAR; INTRAVENOUS ONCE AS NEEDED
Status: DISCONTINUED | OUTPATIENT
Start: 2018-05-09 | End: 2018-05-09 | Stop reason: HOSPADM

## 2018-05-09 RX ORDER — ONDANSETRON 2 MG/ML
4 INJECTION INTRAMUSCULAR; INTRAVENOUS ONCE AS NEEDED
Status: DISCONTINUED | OUTPATIENT
Start: 2018-05-09 | End: 2018-05-09 | Stop reason: HOSPADM

## 2018-05-09 RX ORDER — MEPERIDINE HYDROCHLORIDE 25 MG/ML
12.5 INJECTION INTRAMUSCULAR; INTRAVENOUS; SUBCUTANEOUS
Status: DISCONTINUED | OUTPATIENT
Start: 2018-05-09 | End: 2018-05-09 | Stop reason: HOSPADM

## 2018-05-09 RX ORDER — PROMETHAZINE HYDROCHLORIDE 25 MG/1
25 TABLET ORAL ONCE AS NEEDED
Status: DISCONTINUED | OUTPATIENT
Start: 2018-05-09 | End: 2018-05-09 | Stop reason: HOSPADM

## 2018-05-09 RX ORDER — NEOSTIGMINE METHYLSULFATE 1 MG/ML
INJECTION, SOLUTION INTRAVENOUS AS NEEDED
Status: DISCONTINUED | OUTPATIENT
Start: 2018-05-09 | End: 2018-05-09 | Stop reason: SURG

## 2018-05-09 RX ORDER — LIDOCAINE HYDROCHLORIDE 10 MG/ML
0.5 INJECTION, SOLUTION EPIDURAL; INFILTRATION; INTRACAUDAL; PERINEURAL ONCE AS NEEDED
Status: DISCONTINUED | OUTPATIENT
Start: 2018-05-09 | End: 2018-05-09 | Stop reason: HOSPADM

## 2018-05-09 RX ORDER — SODIUM CHLORIDE, SODIUM LACTATE, POTASSIUM CHLORIDE, CALCIUM CHLORIDE 600; 310; 30; 20 MG/100ML; MG/100ML; MG/100ML; MG/100ML
9 INJECTION, SOLUTION INTRAVENOUS CONTINUOUS PRN
Status: DISCONTINUED | OUTPATIENT
Start: 2018-05-09 | End: 2018-05-09 | Stop reason: HOSPADM

## 2018-05-09 RX ORDER — LIDOCAINE HYDROCHLORIDE 10 MG/ML
INJECTION, SOLUTION EPIDURAL; INFILTRATION; INTRACAUDAL; PERINEURAL AS NEEDED
Status: DISCONTINUED | OUTPATIENT
Start: 2018-05-09 | End: 2018-05-09 | Stop reason: SURG

## 2018-05-09 RX ORDER — FENTANYL CITRATE 50 UG/ML
50 INJECTION, SOLUTION INTRAMUSCULAR; INTRAVENOUS
Status: DISCONTINUED | OUTPATIENT
Start: 2018-05-09 | End: 2018-05-09 | Stop reason: HOSPADM

## 2018-05-09 RX ORDER — LABETALOL HYDROCHLORIDE 5 MG/ML
INJECTION, SOLUTION INTRAVENOUS AS NEEDED
Status: DISCONTINUED | OUTPATIENT
Start: 2018-05-09 | End: 2018-05-09 | Stop reason: SURG

## 2018-05-09 RX ORDER — FAMOTIDINE 20 MG/1
20 TABLET, FILM COATED ORAL
Status: CANCELLED | OUTPATIENT
Start: 2018-05-09

## 2018-05-09 RX ORDER — SODIUM CHLORIDE 0.9 % (FLUSH) 0.9 %
1-10 SYRINGE (ML) INJECTION AS NEEDED
Status: DISCONTINUED | OUTPATIENT
Start: 2018-05-09 | End: 2018-05-09 | Stop reason: HOSPADM

## 2018-05-09 RX ORDER — ONDANSETRON 2 MG/ML
INJECTION INTRAMUSCULAR; INTRAVENOUS AS NEEDED
Status: DISCONTINUED | OUTPATIENT
Start: 2018-05-09 | End: 2018-05-09 | Stop reason: SURG

## 2018-05-09 RX ORDER — NALOXONE HCL 0.4 MG/ML
0.4 VIAL (ML) INJECTION AS NEEDED
Status: DISCONTINUED | OUTPATIENT
Start: 2018-05-09 | End: 2018-05-09 | Stop reason: HOSPADM

## 2018-05-09 RX ORDER — SODIUM CHLORIDE 0.9 % (FLUSH) 0.9 %
1-10 SYRINGE (ML) INJECTION AS NEEDED
Status: CANCELLED | OUTPATIENT
Start: 2018-05-09

## 2018-05-09 RX ORDER — IPRATROPIUM BROMIDE AND ALBUTEROL SULFATE 2.5; .5 MG/3ML; MG/3ML
3 SOLUTION RESPIRATORY (INHALATION) ONCE AS NEEDED
Status: DISCONTINUED | OUTPATIENT
Start: 2018-05-09 | End: 2018-05-09 | Stop reason: HOSPADM

## 2018-05-09 RX ADMIN — ATRACURIUM BESYLATE 40 MG: 10 INJECTION, SOLUTION INTRAVENOUS at 10:22

## 2018-05-09 RX ADMIN — DEXAMETHASONE SODIUM PHOSPHATE 4 MG: 4 INJECTION, SOLUTION INTRAMUSCULAR; INTRAVENOUS at 10:38

## 2018-05-09 RX ADMIN — PROPOFOL 160 MG: 10 INJECTION, EMULSION INTRAVENOUS at 10:22

## 2018-05-09 RX ADMIN — ONDANSETRON 4 MG: 2 INJECTION INTRAMUSCULAR; INTRAVENOUS at 11:21

## 2018-05-09 RX ADMIN — LIDOCAINE HYDROCHLORIDE 40 MG: 10 INJECTION, SOLUTION EPIDURAL; INFILTRATION; INTRACAUDAL; PERINEURAL at 10:22

## 2018-05-09 RX ADMIN — SODIUM CHLORIDE, POTASSIUM CHLORIDE, SODIUM LACTATE AND CALCIUM CHLORIDE 9 ML/HR: 600; 310; 30; 20 INJECTION, SOLUTION INTRAVENOUS at 09:29

## 2018-05-09 RX ADMIN — NEOSTIGMINE METHYLSULFATE 2 MG: 1 INJECTION, SOLUTION INTRAVENOUS at 11:29

## 2018-05-09 RX ADMIN — GLYCOPYRROLATE 0.4 MG: 0.2 INJECTION, SOLUTION INTRAMUSCULAR; INTRAVENOUS at 11:29

## 2018-05-09 RX ADMIN — LABETALOL HYDROCHLORIDE 5 MG: 5 INJECTION, SOLUTION INTRAVENOUS at 11:34

## 2018-05-09 RX ADMIN — LABETALOL HYDROCHLORIDE 5 MG: 5 INJECTION, SOLUTION INTRAVENOUS at 11:04

## 2018-05-09 RX ADMIN — SODIUM CHLORIDE, POTASSIUM CHLORIDE, SODIUM LACTATE AND CALCIUM CHLORIDE: 600; 310; 30; 20 INJECTION, SOLUTION INTRAVENOUS at 10:19

## 2018-05-09 NOTE — PROGRESS NOTES
Chief Complaint   Patient presents with   • Annual Exam       Subjective     Annual physical  History of Present Illness   Annual physical  Betsy Chi is a 62 y.o. female.     Are you currently seeing any other doctors or specialists?  Yes, multiple  Are you currently taking any OTC medications or herbal medications?   Turmeric, cranberry pills, benadryl as needed  Sleep: Interrupted  Diet: Healthy/balanced  Exercise: Regular    Most recent colonoscopy: 5/8/2018  Most recent mammogram: 5/3/2018  Most recent pap smear: Due today  First day of last menses: Hysterectomy years ago, reports history of cervical cancer    Regular dental visits: Not current  Regular eye exams: Ordered    The following portions of the patient's history were reviewed and updated as appropriate: allergies, current medications, past family history, past medical history, past social history, past surgical history and problem list.    Allergies   Allergen Reactions   • Sulfa Antibiotics Swelling and Rash   • Ampicillin Other (See Comments)     DOESN'T REMEMBER   • Aspirin Other (See Comments)     Doesn't remember   • Codeine Other (See Comments)     Doesn't remember   • Contrast Dye Unknown (See Comments)     UNKNOWN   • Erythromycin Other (See Comments)     Doesn't remember   • Ibuprofen Other (See Comments)     Doesn't remember   • Latex Other (See Comments)     Doesn't remember   • Morphine And Related Other (See Comments)     Doesn't remember   • Oxycodone-Acetaminophen Other (See Comments)     Doesn't rememeber   • Oxycodone-Aspirin Other (See Comments)     Doesn't remember   • Penicillins Other (See Comments)     Doesn't remember   • Propoxyphene Other (See Comments)     Doesn't remember   • Saccharin Nausea And Vomiting   • Tramadol Other (See Comments)     Doesn't remember     Social History   Substance Use Topics   • Smoking status: Heavy Tobacco Smoker     Packs/day: 1.00     Types: Cigarettes   • Smokeless tobacco: Never Used       Comment: has attempted to quit this week   • Alcohol use No     Past Surgical History:   Procedure Laterality Date   • BLADDER SURGERY     • BREAST BIOPSY     • BREAST LUMPECTOMY     • BRONCHOSCOPY N/A 5/9/2018    Procedure: BRONCHOSCOPY WITH ENDOBRONCHIAL ULTRASOUND AND NAVIGATION WITH FLUORO;  Surgeon: Nixon Mulligan MD;  Location: [facility] ENDOSCOPY;  Service: Pulmonary   • HYSTERECTOMY     • OOPHORECTOMY     • TUMOR REMOVAL      fallopian tube   • US GUIDED FINE NEEDLE ASPIRATION  5/3/2018     Family History   Problem Relation Age of Onset   • Breast cancer Cousin          Current Outpatient Prescriptions:   •  albuterol (PROVENTIL HFA;VENTOLIN HFA) 108 (90 Base) MCG/ACT inhaler, Inhale 2 puffs Every 4 (Four) Hours As Needed for Wheezing., Disp: 1 inhaler, Rfl: 2  •  aluminum acetate (DOMEBORO) pack packet, Apply  topically 3 (Three) Times a Day As Needed (psoriatic arthritis)., Disp: 100 each, Rfl: 6  •  amLODIPine (NORVASC) 10 MG tablet, TAKE ONE TABLET BY MOUTH DAILY, Disp: 30 tablet, Rfl: 0  •  atorvastatin (LIPITOR) 20 MG tablet, Take 1 tablet by mouth Every Night., Disp: 90 tablet, Rfl: 0  •  carvedilol (COREG) 12.5 MG tablet, TAKE ONE TABLET BY MOUTH TWICE A DAY WITH MEALS, Disp: 60 tablet, Rfl: 0  •  Cranberry 600 MG tablet, Take  by mouth., Disp: , Rfl:   •  diphenhydrAMINE (BENADRYL) 25 MG tablet, Take  by mouth., Disp: , Rfl:   •  glucose blood test strip, Use as instructed, Disp: 100 each, Rfl: 12  •  glucose monitor monitoring kit, 1 each 3 (Three) Times a Day As Needed (hypo/hyperglycemia)., Disp: 1 each, Rfl: 3  •  pantoprazole (PROTONIX) 40 MG EC tablet, Take 1 tablet by mouth Daily., Disp: 90 tablet, Rfl: 0  •  clindamycin (CLEOCIN) 300 MG capsule, Take 1 capsule by mouth 2 (Two) Times a Day., Disp: 14 capsule, Rfl: 0    Patient Active Problem List   Diagnosis   • Mixed anxiety depressive disorder   • Asthma   • Coronary arteriosclerosis in native artery   • Atrial fibrillation   • Biliary  dyskinesia   • History of Cerebral infarction (CT scan of brain this hospitalization normal)   • Chronic bronchitis   • Chronic low back pain   • Exacerbation of COPD/acute bronchitis in an active smoker   • Chronic urinary tract infection   • Hepatic cirrhosis   • Type 2 diabetes mellitus   • Dyslipidemia   • Fibromyalgia   • Gastroesophageal reflux disease without esophagitis   • Hypertension   • Disorder of kidney   • Disorder of endocrine system   • Osteoarthritis   • Osteoporosis   • Pulmonary nodule right lower lobe medially (2 x 1.5 cm).   • Rheumatoid arthritis   • Seasonal allergic rhinitis   • Biliary sludge   • Peptic ulcer of stomach   • Acute on chronic hypoxemic respiratory failure not requiring ventilatory support   • Invasive ductal carcinoma of breast, left       Review of Systems   Constitutional: Negative.    HENT: Positive for congestion, postnasal drip and rhinorrhea. Negative for sinus pressure and sneezing.    Eyes: Positive for visual disturbance.        Awaiting diabetic eye exam   Respiratory: Positive for chest tightness and shortness of breath.         Reports producing mucus because of allergies   Cardiovascular: Negative for chest pain, palpitations and leg swelling.        Chronic atrial fib   Gastrointestinal: Negative for abdominal distention, abdominal pain, blood in stool, constipation, diarrhea, nausea, rectal pain and vomiting.        Colonoscopy 5/8/2018, hemorrhoid flare-cannot tolerate hemorrhoid medicine   Endocrine:        T2DM   Genitourinary: Negative.         Breast cancer   Musculoskeletal: Positive for arthralgias.        Fibromyalgia, osteoarthritis   Skin:        Pusutular psoriasis     Allergic/Immunologic: Positive for environmental allergies and food allergies.        Milk intolerance and cilantro intolerance   Neurological: Positive for headache.        States headaches related to allergies and recent lack of caffeine. Reports history of CVA with negative CT at  last hospital stay   Hematological: Negative.    Psychiatric/Behavioral:        Anxiety and depression       Objective   Vitals:    05/10/18 1005   BP: 132/74   Pulse: 85   Resp: 18   Temp: 98.7 °F (37.1 °C)   SpO2: 99%     Physical Exam   Constitutional: She is oriented to person, place, and time. She appears well-developed and well-nourished. She is cooperative. She is easily aroused.  Non-toxic appearance. She does not have a sickly appearance. She does not appear ill. No distress.   HENT:   Head: Normocephalic and atraumatic. Head is without abrasion. Hair is normal.   Right Ear: Hearing, tympanic membrane, external ear and ear canal normal. No foreign bodies. Tympanic membrane is not perforated and not erythematous.   Left Ear: Hearing, tympanic membrane, external ear and ear canal normal. No foreign bodies. Tympanic membrane is not perforated and not erythematous.   Nose: Nose normal. No mucosal edema, rhinorrhea or septal deviation. No epistaxis.  No foreign bodies.   Mouth/Throat: Oropharynx is clear and moist. No oral lesions. Normal dentition.   Eyes: Lids are normal. Pupils are equal, round, and reactive to light. Right eye exhibits no discharge. Left eye exhibits no discharge. Right conjunctiva is not injected. Left conjunctiva is not injected. No scleral icterus.   Neck: Normal range of motion and full passive range of motion without pain. Neck supple. No edema and normal range of motion present. No thyroid mass and no thyromegaly present.   Cardiovascular: Normal rate, regular rhythm and normal heart sounds.  Exam reveals no gallop and no friction rub.    No murmur heard.  Pulmonary/Chest: Effort normal and breath sounds normal. No accessory muscle usage. She has no rhonchi. She has no rales. She exhibits no tenderness.   Healing biopsy sites on bilateral breasts   Abdominal: Soft. Bowel sounds are normal. She exhibits no distension. There is no hepatosplenomegaly or hepatomegaly. There is no  tenderness. There is no CVA tenderness. Hernia confirmed negative in the right inguinal area and confirmed negative in the left inguinal area.   Genitourinary: Rectum normal and vagina normal. Pelvic exam was performed with patient prone. There is no rash, tenderness, lesion, injury or Bartholin's cyst on the right labia. There is no rash, tenderness, lesion, injury or Bartholin's cyst on the left labia. Uterus is absent.   Genitourinary Comments: Pap smear obtained   Musculoskeletal: Normal range of motion. She exhibits no edema, tenderness or deformity.   Lymphadenopathy:     She has no cervical adenopathy.        Right: No inguinal adenopathy present.        Left: No inguinal adenopathy present.   Neurological: She is alert, oriented to person, place, and time and easily aroused. She has normal reflexes. No cranial nerve deficit. Coordination normal.   Muscle strength 5/5 and equal throughout.   Skin: Skin is warm, dry and intact. No abrasion and no rash noted. She is not diaphoretic. No cyanosis or erythema. Nails show no clubbing.   Dryness/scaly patches on palms and feet.   Psychiatric: Her speech is normal. Her affect is labile.   Emotionally labile and crying intermittently   Nursing note and vitals reviewed.        Results for orders placed or performed during the hospital encounter of 05/09/18   AFB Culture - Wash, Lung, Right Lower Lobe   Result Value Ref Range    AFB Stain No acid fast bacilli seen on concentrated smear    Fungus Smear - Wash, Lung, Right Lower Lobe   Result Value Ref Range    GMS Stain No yeast or hyphal elements seen     GMS Stain       No Pneumocystis jirovecci (formerly Pneumocystis carinii) noted on smear.   Respiratory Culture - Wash, Lung, Right Lower Lobe   Result Value Ref Range    Respiratory Culture Culture in progress     Gram Stain Result Rare (1+) WBCs per low power field     Gram Stain Result Occasional Epithelial cells per low power field     Gram Stain Result No  organisms seen    Basic Metabolic Panel   Result Value Ref Range    Glucose 97 70 - 100 mg/dL    BUN 15 9 - 23 mg/dL    Creatinine 0.80 0.60 - 1.30 mg/dL    Sodium 138 132 - 146 mmol/L    Potassium 4.2 3.5 - 5.5 mmol/L    Chloride 105 99 - 109 mmol/L    CO2 27.0 20.0 - 31.0 mmol/L    Calcium 9.0 8.7 - 10.4 mg/dL    eGFR Non African Amer 72 >60 mL/min/1.73    BUN/Creatinine Ratio 18.8 7.0 - 25.0    Anion Gap 6.0 3.0 - 11.0 mmol/L   POC Glucose Once   Result Value Ref Range    Glucose 101 70 - 130 mg/dL   Tissue Pathology Exam   Result Value Ref Range    Case Report       Surgical Pathology Report                         Case: [id number]                                  Authorizing Provider:  Nixon Mulligan MD     Collected:           05/09/2018 10:36 AM          Ordering Location:     [facility]   Received:            05/09/2018 12:37 PM                                 ENDO SUITES                                                                  Pathologist:           Pato Barry MD                                                            Specimens:   1) - Lymph Node, Station 7 TBNA                                                                     2) - Lymph Node, Station 4R TBNA                                                                    3) - Lung, Right Lower Lobe, RLL Mass Biopsy                                               Clinical Information       The working history is lung nodule and adenopathy.       Final Diagnosis       1. STATION 7 LYMPH NODE, TRANSBRONCHIAL ALAN ASPIRATE:  Anthracotic fragments of lymph node, blood, and hyaline cartilage, no tumor identified.   2. STATION 4R LYMPH NODE, TRANSBRONCHIAL ALAN ASPIRATE:  Blood and fragments of lymph node, no metastatic neoplasms or granulomas identified   3. RIGHT LOWER LOBE MASS BIOPSY:  Blood and fragments of benign bronchial epithelium, no tumor or granulomas identified.   DGD/klb       Gross Description       Specimen 1  received in formalin labeled as station 7 TBNA is a 1.0 x 1.0 x 0.2 cm aggregate of red/pink clot, which is filtered, wrapped and submitted entirely in cassette 1.     Specimen 2 received in formalin labeled as station 4R TBNA is a 1.5 x 1.5 x 0.2 cm aggregate of red/pink clot, which is filtered, wrapped and submitted entirely in cassette 2.     Specimen 3 received in formalin labeled as right lower lobe mass biopsy is a 0.3 x 0.3 x 0.1 cm aggregate of red/pink soft tissue and blood, which is filtered, wrapped and submitted entirely in cassette 3.  HBM/klb         Microscopic Description       Sections of the transbronchial needle aspirate station 7 shows copious amounts of blood and multiple fragments of anthracotic lymph node. Several histiocytes are also noted in the lymph node fragments. No evidence of tumor or granulomas are seen.     Sections of the transbronchial needle aspirate from station 4R show copious amounts of blood mixed with fragments of lymph node showing a mixture of benign lymphoid elements. No metastatic tumor or granulomas are encountered.     Sections of the right lower lobe biopsy show blood, fragments of bronchial epithelial cells and no evidence of tumor, or granulomas. Scattered hemosiderin laden histiocytes are identified.       Embedded Images         Assessment/Plan   Betsy was seen today for annual exam.    Diagnoses and all orders for this visit:    Encounter for preventive health examination    Chest tightness  -     ECG 12 Lead    Biliary sludge    Type 2 diabetes mellitus without complication, without long-term current use of insulin    Rheumatoid arthritis involving multiple sites, unspecified rheumatoid factor presence    Mixed anxiety depressive disorder    Fibromyalgia    Invasive ductal carcinoma of breast, left  Maintain surgical referral  Asthma, unspecified asthma severity, unspecified whether complicated, unspecified whether persistent    Chronic atrial fibrillation  -      Ambulatory Referral to Cardiology    Essential hypertension    Nonspecific abnormal electrocardiogram (ECG) (EKG)  -     Ambulatory Referral to Cardiology    Papanicolaou smear  -     Liquid-based Pap Smear, Screening; Future        ECG 12 Lead  Date/Time: 5/10/2018 11:06 AM  Performed by: ROSALBA SILVA  Authorized by: ROSALBA SILVA   Comparison: not compared with previous ECG   Rhythm: sinus rhythm  Clinical impression: non-specific ECG                   Patient education discussed during this visit:  - avoidance of texting while driving and the need for wearing seatbelt  - use of sunscreen  - healthy sleep habits and appropriate amount of sleep  - H2O consumption, well-balanced diet  - exercise routine which includes at least 150 minutes of cardio per week + muscle strengthening exercises  - immunizations including annual flu vaccination      Return in about 3 months (around 8/10/2018), or if symptoms worsen or fail to improve.

## 2018-05-09 NOTE — PROCEDURES
Bronchoscopy Procedural Note       Date of Operation: 5/9/2018    Pre-op Diagnosis: Lung nodule RLL, mediastinal adenopathy    Post-op Diagnosis: Same    Surgeon: Nixon Mulligan MD    Procedures Performed:  Flexible fiberoptic bronchoscopy  Endobronchial ultrasound with transbronchial needle aspiration of station 7 and 4R lymph nodes  Navigational bronchoscopy with transbronchial needle aspiration of right lower lobe nodule   Peripheral radial endobronchial ultrasound to verify placement of guide  Fluoroscopic guidance/monitoring throughout biopsies    Anesthesia: General    Estimated Blood Loss: <5 ml    Description of Procedure:  After obtaining informed consent and completing a procedural pause, the patient was turned over to anesthesia for preoperative care. After the induction of general anesthesia, the flex fiberoptic bronchoscope was passed to the pre-existing endotracheal tube. The tube is well positioned approximately 3 cm above the georgina. The georgina is seen to be sharp. Visualized portions of the trachea appeared normal. First proceeded to the right side and surved the right upper, the right middle, and the right lower lobes. These are free of any endobronchial lesions down to the level of the subsegments. No retained secretions are noted. I then proceeded to the left side and surveyed the left upper lobe, the lingula, and the left lower lobe. These are similarly seen to be clear of any endobronchial lesions or retained secretions.    I then switched to the linear endobronchial ultrasound scope and surveyed the mediastinum and bilateral jesse. I was able to identify biopsy targets at both station 7 as well as station 4R. No other acceptable targets were noted. I performed multiple transbronchial needle aspirations first at station 7 and then at station 4R with good return of material. Hemostasis was good.    The scope was removed and after synchronization with the navigation computer, I utilized the  bronchoscope with the locatable guide to navigate to the right lower lobe nodule in the posterior segment. The locatable guide was removed leaving the working channel in place. Positioning was confirmed with radial ultrasound which showed good placement in this entity of the lesion. Fluoroscopic guidance also showed good placement. He lies in the biopsy needle, I performed multiple transbronchial needle aspirations of the lesion. Washings were taken throughout. Hemostasis was good with an estimated blood loss less than 5 mL. There was no fluoroscopic rinse of pneumothorax. The airways were suctioned clean and the scope was removed.    There were no immediate complications. The patient was estimated without difficulty. Postoperative chest x-ray shows no sign of pneumothorax.    Findings and Recommendations:  Normal tracheobronchial tree  We will await pathology as well as microbiologic studies on the specimens.  I discussed this with the patient and her family and I will plan to discuss the findings via telephone when they are available.      Nixon Mulligan MD, [facility]  Pulmonary and Critical Care Medicine   05/09/18 1:12 PM

## 2018-05-09 NOTE — ANESTHESIA PREPROCEDURE EVALUATION
Anesthesia Evaluation     NPO Solid Status: > 8 hours  NPO Liquid Status: > 8 hours           Airway   Mallampati: II  TM distance: >3 FB  No difficulty expected  Dental    (+) upper dentures    Pulmonary    (+) a smoker Current, asthma, decreased breath sounds,   Cardiovascular     ECG reviewed  Rhythm: regular  Rate: normal    (+) hypertension, dysrhythmias Atrial Fib,   (-) angina, murmur, cardiac stents      Neuro/Psych  (+) CVA (weakness B/L upper extremities she says),     (-) TIA  GI/Hepatic/Renal/Endo    (+)   diabetes mellitus (NIDDM),   (-) no renal disease    Musculoskeletal     Abdominal    Substance History      OB/GYN          Other                      Anesthesia Plan    ASA 4     general     intravenous induction     Plan discussed with CRNA.

## 2018-05-09 NOTE — ANESTHESIA POSTPROCEDURE EVALUATION
Patient: Betsy Chi    Procedure Summary     Date:  05/09/18 Room / Location:   [facility]    Anesthesia Start:  1019 Anesthesia Stop:  1148    Procedure:  BRONCHOSCOPY WITH ENDOBRONCHIAL ULTRASOUND AND NAVIGATION WITH FLUORO (N/A Bronchus) Diagnosis:      Surgeon:  Nixon Mulligan MD Provider:  Spike Casas MD    Anesthesia Type:  general ASA Status:  4          Anesthesia Type: general  Last vitals  BP   140/45   Temp       Pulse 78   Resp 22   SpO2 97%     Anesthesia Post Evaluation

## 2018-05-09 NOTE — H&P
Pulmonary Medicine Consultation     Patient Care Team:  PRAFUL Wheatley as PCP - General (Nurse Practitioner)    Reason for Consultation: Right lower lobe lung nodule with adenopathy    Subjective   History of Present Illness:  Ms. Betsy Chi is a 62 y.o. female  was recently admitted to the hospital for shortness of breath. She had an incidentally discovered right lower lobe 2 cm nodule which my partner Dr. Stringer evaluated her for. She was referred to me for tissue diagnosis via bronchoscopy. In the meantime, it seems that she has had biopsies performed of the breast density which appear to be malignant. She does have a previous history of breast cancer in the right side many years ago though this has now been found in the left side. Patient states that she is breathing without difficulty though she is very anxious about the procedure today. She has been off her blood thinners for over a week.        Past Medical History:  Past Medical History:   Diagnosis Date   • Arthritis    • Asthma    • Breast cancer    • CHF (congestive heart failure)    • Chronic kidney disease (CKD)    • COPD (chronic obstructive pulmonary disease)    • CVA (cerebral vascular accident)    • Diabetes mellitus, type 2    • GERD (gastroesophageal reflux disease)    • Hypercholesteremia    • Hypertension    • Myocardial infarction    • Osteoarthritis    • Psoriatic arthritis    • Pulmonary embolism    • Rheumatoid arthritis    • Sciatica    • Smoker    • UTI (urinary tract infection)      Past Surgical History:   Procedure Laterality Date   • BLADDER SURGERY     • BREAST BIOPSY     • BREAST LUMPECTOMY     • HYSTERECTOMY     • OOPHORECTOMY     • TUMOR REMOVAL      fallopian tube       Allergies:  Allergies   Allergen Reactions   • Sulfa Antibiotics Swelling and Rash   • Ampicillin Other (See Comments)     DOESN'T REMEMBER   • Aspirin Other (See Comments)     Doesn't remember   • Codeine Other (See Comments)     Doesn't remember   •  Contrast Dye Unknown (See Comments)     UNKNOWN   • Erythromycin Other (See Comments)     Doesn't remember   • Ibuprofen Other (See Comments)     Doesn't remember   • Latex Other (See Comments)     Doesn't remember   • Morphine And Related Other (See Comments)     Doesn't remember   • Oxycodone-Acetaminophen Other (See Comments)     Doesn't rememeber   • Oxycodone-Aspirin Other (See Comments)     Doesn't remember   • Penicillins Other (See Comments)     Doesn't remember   • Propoxyphene Other (See Comments)     Doesn't remember   • Saccharin Nausea And Vomiting   • Tramadol Other (See Comments)     Doesn't remember       Medications:  No current facility-administered medications on file prior to encounter.      Current Outpatient Prescriptions on File Prior to Encounter   Medication Sig Dispense Refill   • Cranberry 600 MG tablet Take  by mouth.     • diphenhydrAMINE (BENADRYL) 25 MG tablet Take  by mouth.     • pantoprazole (PROTONIX) 40 MG EC tablet Take 1 tablet by mouth Daily. 90 tablet 0   • albuterol (PROVENTIL HFA;VENTOLIN HFA) 108 (90 Base) MCG/ACT inhaler Inhale 2 puffs Every 4 (Four) Hours As Needed for Wheezing. 1 inhaler 2   • aluminum acetate (DOMEBORO) pack packet Apply  topically 3 (Three) Times a Day As Needed (psoriatic arthritis). 100 each 6   • glucose blood test strip Use as instructed 100 each 12   • glucose monitor monitoring kit 1 each 3 (Three) Times a Day As Needed (hypo/hyperglycemia). 1 each 3       lactated ringers 9 mL/hr Last Rate: 9 mL/hr (05/09/18 0929)       famotidine 20 mg Oral 60 Min Pre-Op       Social History:  Social History     Social History   • Marital status: Legally      Spouse name: N/A   • Number of children: N/A   • Years of education: N/A     Occupational History   • Not on file.     Social History Main Topics   • Smoking status: Heavy Tobacco Smoker     Packs/day: 1.00     Types: Cigarettes   • Smokeless tobacco: Never Used      Comment: has attempted to quit  this week   • Alcohol use No   • Drug use: No   • Sexual activity: Defer     Other Topics Concern   • Not on file     Social History Narrative   • No narrative on file       Family History:  Family History   Problem Relation Age of Onset   • Breast cancer Cousin      Family history is reviewed and is not contributory to the case.    Objective   Objective     Physical Exam:  Vitals:    05/09/18 0920   BP: 129/71   Pulse: 68   Resp: 18   SpO2: 99%     Physical Exam   Constitutional: She is oriented to person, place, and time. She appears well-developed and well-nourished.   HENT:   Head: Normocephalic and atraumatic.   Eyes: Conjunctivae and EOM are normal. Pupils are equal, round, and reactive to light. Right eye exhibits no discharge. Left eye exhibits no discharge.   Neck: Normal range of motion. Neck supple. No JVD present. No tracheal deviation present. No thyromegaly present.   Cardiovascular: Normal rate.  Exam reveals no friction rub.    No murmur heard.  Pulmonary/Chest: Effort normal and breath sounds normal. No stridor. No respiratory distress. She has no wheezes. She has no rales.   Abdominal: Soft. Bowel sounds are normal. She exhibits no distension. There is no guarding.   Musculoskeletal: Normal range of motion. She exhibits no edema or deformity.   Lymphadenopathy:     She has no cervical adenopathy.   Neurological: She is alert and oriented to person, place, and time.   Skin: Skin is warm. Capillary refill takes less than 2 seconds.   Psychiatric: She has a normal mood and affect. Her behavior is normal. Thought content normal.   Vitals reviewed.      Lab Results/Imaging/Diagnostics:        Results from last 7 days  Lab Units 05/09/18  0924   SODIUM mmol/L 138   POTASSIUM mmol/L 4.2   CO2 mmol/L 27.0   BUN mg/dL 15   CREATININE mg/dL 0.80   GLUCOSE mg/dL 97     Estimated Creatinine Clearance: 69.4 mL/min (by C-G formula based on SCr of 0.8 mg/dL).      Assessment/Plan   Impression & Plan      Impression:  Principal Problem:    Pulmonary nodule right lower lobe medially (2 x 1.5 cm).  Active Problems:    Chronic bronchitis    Invasive ductal carcinoma of breast, left      Plan:    Plan to proceed with bronchoscopy with bronchial ultrasound of the mediastinum with plans for sampling of the station 4R, station 7, and possibly 10 are. We will then transition over to the navigation system and attempt to navigate 2 and biopsy of the right lower lobe 2 cm nodule. I discussed the risks, benefits, and alternatives with the patient and she has agreed to proceed.       Nixon Mulligan MD, [facility]  Pulmonary and Critical Care Medicine  05/09/18 9:59 AM

## 2018-05-10 ENCOUNTER — OFFICE VISIT (OUTPATIENT)
Dept: INTERNAL MEDICINE | Facility: CLINIC | Age: 63
End: 2018-05-10

## 2018-05-10 VITALS
WEIGHT: 168 LBS | HEIGHT: 63 IN | BODY MASS INDEX: 29.77 KG/M2 | OXYGEN SATURATION: 99 % | TEMPERATURE: 98.7 F | HEART RATE: 85 BPM | SYSTOLIC BLOOD PRESSURE: 132 MMHG | DIASTOLIC BLOOD PRESSURE: 74 MMHG | RESPIRATION RATE: 18 BRPM

## 2018-05-10 DIAGNOSIS — C50.912 INVASIVE DUCTAL CARCINOMA OF BREAST, LEFT (HCC): ICD-10-CM

## 2018-05-10 DIAGNOSIS — M06.9 RHEUMATOID ARTHRITIS INVOLVING MULTIPLE SITES, UNSPECIFIED RHEUMATOID FACTOR PRESENCE: ICD-10-CM

## 2018-05-10 DIAGNOSIS — R94.31 NONSPECIFIC ABNORMAL ELECTROCARDIOGRAM (ECG) (EKG): ICD-10-CM

## 2018-05-10 DIAGNOSIS — Z00.00 ENCOUNTER FOR PREVENTIVE HEALTH EXAMINATION: Primary | ICD-10-CM

## 2018-05-10 DIAGNOSIS — F41.8 MIXED ANXIETY DEPRESSIVE DISORDER: ICD-10-CM

## 2018-05-10 DIAGNOSIS — J45.909 ASTHMA, UNSPECIFIED ASTHMA SEVERITY, UNSPECIFIED WHETHER COMPLICATED, UNSPECIFIED WHETHER PERSISTENT: ICD-10-CM

## 2018-05-10 DIAGNOSIS — E11.9 TYPE 2 DIABETES MELLITUS WITHOUT COMPLICATION, WITHOUT LONG-TERM CURRENT USE OF INSULIN (HCC): ICD-10-CM

## 2018-05-10 DIAGNOSIS — R07.89 CHEST TIGHTNESS: ICD-10-CM

## 2018-05-10 DIAGNOSIS — I10 ESSENTIAL HYPERTENSION: ICD-10-CM

## 2018-05-10 DIAGNOSIS — Z12.4 PAPANICOLAOU SMEAR: ICD-10-CM

## 2018-05-10 DIAGNOSIS — I48.20 CHRONIC ATRIAL FIBRILLATION (HCC): ICD-10-CM

## 2018-05-10 DIAGNOSIS — K83.8 BILIARY SLUDGE: ICD-10-CM

## 2018-05-10 DIAGNOSIS — M79.7 FIBROMYALGIA: ICD-10-CM

## 2018-05-10 LAB
CYTO UR: NORMAL
GIE STN SPEC: NORMAL
GIE STN SPEC: NORMAL
LAB AP CASE REPORT: NORMAL
LAB AP CLINICAL INFORMATION: NORMAL
Lab: NORMAL
PATH REPORT.FINAL DX SPEC: NORMAL
PATH REPORT.GROSS SPEC: NORMAL

## 2018-05-10 PROCEDURE — 93000 ELECTROCARDIOGRAM COMPLETE: CPT | Performed by: NURSE PRACTITIONER

## 2018-05-10 PROCEDURE — 99212 OFFICE O/P EST SF 10 MIN: CPT | Performed by: NURSE PRACTITIONER

## 2018-05-10 PROCEDURE — 99396 PREV VISIT EST AGE 40-64: CPT | Performed by: NURSE PRACTITIONER

## 2018-05-11 LAB
BACTERIA SPEC RESP CULT: NORMAL
BACTERIA SPEC RESP CULT: NORMAL
GRAM STN SPEC: NORMAL

## 2018-05-13 LAB
FLUAV AG NPH QL: NEGATIVE
FLUBV AG NPH QL: NEGATIVE
HADV SPEC QL CULT: NEGATIVE
HPIV1 RNA SPEC QL NAA+PROBE: NEGATIVE
HPIV2 SPEC QL CULT: NEGATIVE
HPIV3 SPEC QL CULT: NEGATIVE
RSV AG SPEC QL: NEGATIVE

## 2018-05-14 ENCOUNTER — TELEPHONE (OUTPATIENT)
Dept: GASTROENTEROLOGY | Facility: CLINIC | Age: 63
End: 2018-05-14

## 2018-05-14 LAB
LAB AP CASE REPORT: NORMAL
Lab: NORMAL
PATH REPORT.FINAL DX SPEC: NORMAL

## 2018-05-14 NOTE — TELEPHONE ENCOUNTER
----- Message from Phil Rocha MD sent at 5/10/2018  6:15 AM EDT -----  Please call Betsy and inform her the polyps that were removed were adenomas.  Follow-up colonoscopy in 5 years time is indicated.  Dr. Rocha

## 2018-05-17 ENCOUNTER — TELEPHONE (OUTPATIENT)
Dept: INTERNAL MEDICINE | Facility: CLINIC | Age: 63
End: 2018-05-17

## 2018-05-21 ENCOUNTER — TELEPHONE (OUTPATIENT)
Dept: PULMONOLOGY | Facility: CLINIC | Age: 63
End: 2018-05-21

## 2018-05-21 DIAGNOSIS — R91.8 LUNG MASS: Primary | ICD-10-CM

## 2018-05-21 NOTE — TELEPHONE ENCOUNTER
"I tried to call Mrs. Arthur again today and have only gotten voice mail. I was out of town last week but tried to call her several times.  The biopsy was negative however the cytology of the wash was returned as \"suspicious.\" I think that the positioning of the lesion will make repeat bronchoscopy fruitless. I believe that a thoracic surgeon's opinion on the feasibility of VATS will be out next step and I will try to get this arranged.    Nixon Mulligan MD    "

## 2018-05-22 ENCOUNTER — TELEPHONE (OUTPATIENT)
Dept: OTHER | Facility: HOSPITAL | Age: 63
End: 2018-05-22

## 2018-05-22 NOTE — TELEPHONE ENCOUNTER
Discussed plan with Dr. Mulligan who has been unable to reach patient. Dr. Mulligan wants referral to CTS for wedge biopsy possible lobectomy of RLL nodule which was suspicious on recent bronch. Called patient's granddaughter due to difficulty reaching patient to relay this information. Granddaughter v/u and requests CTS office call herself instead of patient. Referral made and communications included to call granddaughter with appointment. She knows to call with questions or concerns.

## 2018-05-24 ENCOUNTER — DOCUMENTATION (OUTPATIENT)
Dept: OTHER | Facility: HOSPITAL | Age: 63
End: 2018-05-24

## 2018-05-24 NOTE — PROGRESS NOTES
I saw patient with Dr OLMSTEAD and patients nidebi who the patient says she lives with. Dr OLMSTEAD reviewed the pathology report and surgical options - Stage IIB, LG IDC x2 in left breast with positive lymph node. ER/AL positive and HER negative. The patient recently had a bronchoscopy with suspicious findings and she is to follow up with CT surgeon for further workup. The patient has appointment to see Dr Crawford on 5/29/18. We will follow with surgical plan after more is known about lung lesion.  I reviewed the educational  And supportive materials with her.

## 2018-05-25 ENCOUNTER — CONSULT (OUTPATIENT)
Dept: ONCOLOGY | Facility: CLINIC | Age: 63
End: 2018-05-25

## 2018-05-25 VITALS
RESPIRATION RATE: 18 BRPM | HEART RATE: 70 BPM | SYSTOLIC BLOOD PRESSURE: 132 MMHG | HEIGHT: 63 IN | BODY MASS INDEX: 29.95 KG/M2 | WEIGHT: 169 LBS | DIASTOLIC BLOOD PRESSURE: 80 MMHG | TEMPERATURE: 97.9 F

## 2018-05-25 DIAGNOSIS — C34.31 MALIGNANT NEOPLASM OF LOWER LOBE OF RIGHT LUNG (HCC): ICD-10-CM

## 2018-05-25 DIAGNOSIS — C50.912 INVASIVE DUCTAL CARCINOMA OF BREAST, LEFT (HCC): Primary | ICD-10-CM

## 2018-05-25 PROCEDURE — 99205 OFFICE O/P NEW HI 60 MIN: CPT | Performed by: INTERNAL MEDICINE

## 2018-05-25 NOTE — PROGRESS NOTES
Subjective     PROBLEM LIST:  1. [id number] invasive ductal carcinoma of the left breast, ER+, UT+, Her2 negative  A) presented with a palpable mass in the left breast as well as skin thickening.  biopsy showed multifocal low grade IDC.  Skin biopsy negative.  2. History of right breast cancer 2001, treated with lumpectomy and SLNB, ? CMF, and adjuvant hormonal therapy  3. Anxiety/depression  4. Rheumatoid arthritis  5. Asthma  6. CHF  7. COPD  8. Diabetes mellitus type 2  9. GERD  10. HTN  11. HL  12. CVA  13. Tobacco abuse  14. choriocarcinoma  15. Fibromyalgia  16. Atrial fibrillation    CHIEF COMPLAINT: breast cancer and lung mass      HISTORY OF PRESENT ILLNESS:  The patient is a 62 y.o. year old female, referred for evaluation of a recently diagnosed left-sided breast cancer, as well as a lung mass.    In April she was placed on Bactrim for a urinary tract infection.  She has a history of recurrent UTI.  She says that she had a severe reaction to Bactrim leading to breathing difficulty and was admitted for this.  While she was there she had a CT of the chest which showed a right lower lobe mass in the lung concerning for malignancy.  Breast masses were also seen on the CT scan.  She says she has been aware of masses in her left breast for a while.  However she lost her insurance for a period of time and was able to get further workup.    She lives in [place] with her nephew and his wife.  She is currently smoking but says she does not inhale.  She has a history of multiple strokes when she was in her 40s which have left her with memory loss and some generalized weakness.  She has a history of right-sided breast cancer in 2001, which was treated with chemotherapy and surgery.  She also has a history of choriocarcinoma.    REVIEW OF SYSTEMS:  A 14 point review of systems was performed and is negative except as noted above.    Past Medical History:   Diagnosis Date   • Arthritis    • Asthma    • Breast  cancer    • CHF (congestive heart failure)    • Chronic kidney disease (CKD)    • COPD (chronic obstructive pulmonary disease)    • CVA (cerebral vascular accident)    • Diabetes mellitus, type 2    • GERD (gastroesophageal reflux disease)    • Hypercholesteremia    • Hypertension    • Myocardial infarction    • Osteoarthritis    • Psoriatic arthritis    • Pulmonary embolism    • Rheumatoid arthritis    • Sciatica    • Smoker    • UTI (urinary tract infection)        GYN History: , 1st birth at 15, menarche 11    Current Outpatient Prescriptions on File Prior to Visit   Medication Sig Dispense Refill   • albuterol (PROVENTIL HFA;VENTOLIN HFA) 108 (90 Base) MCG/ACT inhaler Inhale 2 puffs Every 4 (Four) Hours As Needed for Wheezing. 1 inhaler 2   • aluminum acetate (DOMEBORO) pack packet Apply  topically 3 (Three) Times a Day As Needed (psoriatic arthritis). 100 each 6   • amLODIPine (NORVASC) 10 MG tablet TAKE ONE TABLET BY MOUTH DAILY 30 tablet 0   • atorvastatin (LIPITOR) 20 MG tablet Take 1 tablet by mouth Every Night. 90 tablet 0   • carvedilol (COREG) 12.5 MG tablet TAKE ONE TABLET BY MOUTH TWICE A DAY WITH MEALS 60 tablet 0   • clindamycin (CLEOCIN) 300 MG capsule Take 1 capsule by mouth 2 (Two) Times a Day. 14 capsule 0   • Cranberry 600 MG tablet Take  by mouth.     • diphenhydrAMINE (BENADRYL) 25 MG tablet Take  by mouth.     • glucose blood test strip Use as instructed 100 each 12   • glucose monitor monitoring kit 1 each 3 (Three) Times a Day As Needed (hypo/hyperglycemia). 1 each 3   • pantoprazole (PROTONIX) 40 MG EC tablet Take 1 tablet by mouth Daily. 90 tablet 0     No current facility-administered medications on file prior to visit.        Allergies   Allergen Reactions   • Sulfa Antibiotics Swelling and Rash   • Ampicillin Other (See Comments)     DOESN'T REMEMBER   • Aspirin Other (See Comments)     Doesn't remember   • Codeine Other (See Comments)     Doesn't remember   • Contrast Dye Unknown  "(See Comments)     UNKNOWN   • Erythromycin Other (See Comments)     Doesn't remember   • Ibuprofen Other (See Comments)     Doesn't remember   • Latex Other (See Comments)     Doesn't remember   • Morphine And Related Other (See Comments)     Doesn't remember   • Oxycodone-Acetaminophen Other (See Comments)     Doesn't rememeber   • Oxycodone-Aspirin Other (See Comments)     Doesn't remember   • Penicillins Other (See Comments)     Doesn't remember   • Propoxyphene Other (See Comments)     Doesn't remember   • Saccharin Nausea And Vomiting   • Tramadol Other (See Comments)     Doesn't remember       Past Surgical History:   Procedure Laterality Date   • BLADDER SURGERY     • BREAST BIOPSY     • BREAST LUMPECTOMY     • BRONCHOSCOPY N/A 5/9/2018    Procedure: BRONCHOSCOPY WITH ENDOBRONCHIAL ULTRASOUND AND NAVIGATION WITH FLUORO;  Surgeon: Nixon Mulligan MD;  [facility] END[facility];  Service: Pulmonary   • HYSTERECTOMY     • OOPHORECTOMY     • TUMOR REMOVAL      fallopian tube   • US GUIDED FINE NEEDLE ASPIRATION  5/3/2018       Social History     Social History   • Marital status: Legally      Social History Main Topics   • Smoking status: Heavy Tobacco Smoker     Packs/day: 1.00     Types: Cigarettes   • Smokeless tobacco: Never Used      Comment: has attempted to quit this week   • Alcohol use No   • Drug use: No   • Sexual activity: Defer     Other Topics Concern   • Not on file       Family History   Problem Relation Age of Onset   • Breast cancer Cousin        Objective     /80   Pulse 70   Temp 97.9 °F (36.6 °C) (Temporal Artery )   Resp 18   Ht 160 cm (63\")   Wt 76.7 kg (169 lb)   BMI 29.94 kg/m²   Performance Status: 2  General:  female in no acute distress  Neuro: alert and oriented  HEENT: sclera anicteric, oropharynx clear  Lymphatics: no cervical, supraclavicular, or axillary adenopathy  Cardiovascular: regular rate and rhythm, no murmurs  Lungs: clear to auscultation " bilaterally  Breast: left breast with peau d'orange around the nipple, no erythema or warmth.  Indistinct nodularity in the lower inner and outer quadrants  Abdomen: soft, nontender, nondistended.  No palpable organomegaly  Extremeties: no lower extremity edema  Skin: no rashes, lesions, bruising, or petechiae  Psych: tearful    Labs: On 4/25/2018 white count was 10.6, hemoglobin 13.0, platelets 239.  Creatinine 0.7    Imaging: I personally reviewed the CT images of the chest.  There is a right lower lobe nodule about 2 cm in size.  No other obvious abnormalities in the chest.  There are mildly enlarged left axillary lymph nodes.        Assessment/Plan     Betsy Chi is a 62 y.o. year old female with a newly diagnosed T1 N1 low grade ER positive HER-2 negative invasive ductal carcinoma of the left breast.  She also has a right lower lobe lung mass.  Based on the appearance of her imaging, I think this is most likely to be 2 separate primaries.  The bronchial washings done to evaluate the lung nodule were suspicious for malignancy but no further diagnostic testing can be done on that sample.    In terms of her breast cancer, I would recommend starting by completing a staging evaluation.  We will plan to do a PET scan and MRI of the brain.  I think an MRI is warranted in this situation given the possibility of 2 separate cancers, as well as shown increased memory difficulty and weakness that she complains of.     Suspected lung cancer:  If this is 2 separate malignancies, the primary treatment would be surgical in both cases.  CyberKnife of the lung lesion could also be considered if she is not thought to be a good surgical candidate for lung resection.  She is scheduled to see Dr. Crawford next week.      I will plan to see her back in the next one to 2 weeks to review the results of her staging scans.           Mary Mendoza MD    5/25/2018

## 2018-05-27 DIAGNOSIS — I10 ESSENTIAL HYPERTENSION: ICD-10-CM

## 2018-05-29 ENCOUNTER — OFFICE VISIT (OUTPATIENT)
Dept: CARDIAC SURGERY | Facility: CLINIC | Age: 63
End: 2018-05-29

## 2018-05-29 VITALS
TEMPERATURE: 97.7 F | DIASTOLIC BLOOD PRESSURE: 72 MMHG | HEIGHT: 63 IN | OXYGEN SATURATION: 98 % | HEART RATE: 75 BPM | BODY MASS INDEX: 29.45 KG/M2 | SYSTOLIC BLOOD PRESSURE: 118 MMHG | WEIGHT: 166.2 LBS

## 2018-05-29 DIAGNOSIS — C50.912 INVASIVE DUCTAL CARCINOMA OF BREAST, LEFT (HCC): Primary | ICD-10-CM

## 2018-05-29 DIAGNOSIS — R91.8 LUNG MASS: ICD-10-CM

## 2018-05-29 PROCEDURE — 99203 OFFICE O/P NEW LOW 30 MIN: CPT | Performed by: THORACIC SURGERY (CARDIOTHORACIC VASCULAR SURGERY)

## 2018-05-29 RX ORDER — AMLODIPINE BESYLATE 10 MG/1
TABLET ORAL
Qty: 30 TABLET | Refills: 0 | Status: SHIPPED | OUTPATIENT
Start: 2018-05-29 | End: 2018-06-27 | Stop reason: SDUPTHER

## 2018-05-29 RX ORDER — CARVEDILOL 12.5 MG/1
TABLET ORAL
Qty: 60 TABLET | Refills: 0 | Status: SHIPPED | OUTPATIENT
Start: 2018-05-29 | End: 2018-06-27 | Stop reason: SDUPTHER

## 2018-05-30 ENCOUNTER — HOSPITAL ENCOUNTER (OUTPATIENT)
Dept: CARDIOLOGY | Facility: HOSPITAL | Age: 63
Discharge: HOME | End: 2018-05-30
Admitting: NURSE PRACTITIONER

## 2018-05-30 ENCOUNTER — OFFICE VISIT (OUTPATIENT)
Dept: CARDIOLOGY | Facility: HOSPITAL | Age: 63
End: 2018-05-30

## 2018-05-30 VITALS
WEIGHT: 170.4 LBS | HEIGHT: 63 IN | RESPIRATION RATE: 16 BRPM | TEMPERATURE: 98.2 F | OXYGEN SATURATION: 94 % | DIASTOLIC BLOOD PRESSURE: 66 MMHG | SYSTOLIC BLOOD PRESSURE: 108 MMHG | HEART RATE: 72 BPM | BODY MASS INDEX: 30.19 KG/M2

## 2018-05-30 DIAGNOSIS — R06.02 SHORTNESS OF BREATH: ICD-10-CM

## 2018-05-30 DIAGNOSIS — R60.9 EDEMA, UNSPECIFIED TYPE: ICD-10-CM

## 2018-05-30 DIAGNOSIS — R00.2 PALPITATIONS: Primary | ICD-10-CM

## 2018-05-30 DIAGNOSIS — R42 DIZZINESS: ICD-10-CM

## 2018-05-30 DIAGNOSIS — R00.2 PALPITATIONS: ICD-10-CM

## 2018-05-30 PROCEDURE — 99214 OFFICE O/P EST MOD 30 MIN: CPT | Performed by: NURSE PRACTITIONER

## 2018-05-30 PROCEDURE — 93272 ECG/REVIEW INTERPRET ONLY: CPT | Performed by: INTERNAL MEDICINE

## 2018-05-30 PROCEDURE — 93270 REMOTE 30 DAY ECG REV/REPORT: CPT

## 2018-05-30 NOTE — PROGRESS NOTES
[facility]  [facility]      Encounter Date:05/30/2018     Betsy Chi  [address] [id number]  [phone]    1955    Korey[place]ggie PRAFUL Maldonado    Betsy Chi is a 62 y.o. female.      Subjective:     Chief Complaint:  Establish Care (Atrial fibrillation)       HPI     62-year-old female admitted to [facility] on 4/22/18 with acute respiratory failure and hypoxia.  Acute exacerbation of COPD.  Pneumonia or infectious etiology was not noted.  She was found to have a right lower lobe nodule on CT.  Patient has a history of CVA currently on Plavix.  Plavix was held for possible bronchoscopy and biopsy.  Patient has a history of breast cancer status post lumpectomy with recurrent breast nodules.  Biopsy was not completed due to recent Plavix.  Even though patient reports having history of CVA her CT revealed no old infarcts.  Patient has a history of atrial fibrillation, diabetes, tobacco use, hypertension, dyslipidemia.  Last seen by Dr. CADENCE CASAS and 2016.    Pt reports intermittent palpitations and racing HR. Associated with chest squeezing, Intermittent dizziness. 1-2 times per week.  Seconds to several minutes.    Occur more at rest, but can occur with exertion but not as often.  Pt states this is not a new finding. Noted for several years.  Has not worsened over the years.  Reports edema, mild intermittent.  Worse over the last month.  Hx of hypertension reports well controlled. Plans for possible surgery (mastectomy and lung sx).  Pt report intermittent dyspnea.       Patient Active Problem List    Diagnosis   • Invasive ductal carcinoma of breast, left [C50.912]   • Acute on chronic hypoxemic respiratory failure not requiring ventilatory support [J96.91]   • Mixed anxiety depressive disorder [F41.8]   • Asthma [J45.909]   • Coronary arteriosclerosis in native artery [I25.10]   • Atrial fibrillation [I48.91]   • Biliary dyskinesia [K82.8]   • History  of Cerebral infarction (CT scan of brain this hospitalization normal) [I63.9]   • Chronic bronchitis [J42]   • Chronic low back pain [M54.5, G89.29]   • Exacerbation of COPD/acute bronchitis in an active smoker [J44.9]   • Chronic urinary tract infection [N39.0]   • Hepatic cirrhosis [K74.60]   • Type 2 diabetes mellitus [E11.9]   • Dyslipidemia [E78.5]   • Fibromyalgia [M79.7]   • Gastroesophageal reflux disease without esophagitis [K21.9]   • Hypertension [I10]   • Disorder of kidney [N28.9]   • Disorder of endocrine system [E34.9]     Overview Note:     Description: A. NOw followed  by oncology 5 patient's report, details unknown     • Osteoarthritis [M19.90]   • Osteoporosis [M81.0]   • Pulmonary nodule right lower lobe medially (2 x 1.5 cm). [R91.8]     Overview Note:     Description: A.  CT scan of the chest February 11 thousand 16 reports a noncalcified 8.5-9 mm nodule in the medial portion of the right lower lobe.     • Rheumatoid arthritis [M06.9]   • Seasonal allergic rhinitis [J30.2]   • Biliary sludge [K83.8]   • Peptic ulcer of stomach [K25.9]         Past Surgical History:   Procedure Laterality Date   • BLADDER SURGERY     • BREAST BIOPSY     • BREAST LUMPECTOMY     • BRONCHOSCOPY N/A 5/9/2018    Procedure: BRONCHOSCOPY WITH ENDOBRONCHIAL ULTRASOUND AND NAVIGATION WITH FLUORO;  Surgeon: Nixon Mulligan MD;  Location: [facility] ENDOSCOPY;  Service: Pulmonary   • HYSTERECTOMY     • OOPHORECTOMY     • TUMOR REMOVAL      fallopian tube   • US GUIDED FINE NEEDLE ASPIRATION  5/3/2018       Allergies   Allergen Reactions   • Ampicillin Anaphylaxis   • Erythromycin Anaphylaxis   • Morphine And Related Anaphylaxis   • Oxycodone-Acetaminophen Anaphylaxis   • Oxycodone-Aspirin Anaphylaxis   • Penicillins Anaphylaxis   • Sulfa Antibiotics Swelling and Rash   • Aspirin GI Bleeding   • Codeine Swelling   • Contrast Dye Swelling   • Ibuprofen GI Bleeding   • Latex Arrhythmia   • Propoxyphene Nausea And Vomiting   •  Saccharin Nausea And Vomiting   • Tramadol Swelling         Current Outpatient Prescriptions:   •  albuterol (PROVENTIL HFA;VENTOLIN HFA) 108 (90 Base) MCG/ACT inhaler, Inhale 2 puffs Every 4 (Four) Hours As Needed for Wheezing., Disp: 1 inhaler, Rfl: 2  •  aluminum acetate (DOMEBORO) pack packet, Apply  topically 3 (Three) Times a Day As Needed (psoriatic arthritis)., Disp: 100 each, Rfl: 6  •  amLODIPine (NORVASC) 10 MG tablet, TAKE ONE TABLET BY MOUTH DAILY, Disp: 30 tablet, Rfl: 0  •  atorvastatin (LIPITOR) 20 MG tablet, Take 1 tablet by mouth Every Night., Disp: 90 tablet, Rfl: 0  •  carvedilol (COREG) 12.5 MG tablet, TAKE ONE TABLET BY MOUTH TWICE A DAY WITH MEALS, Disp: 60 tablet, Rfl: 0  •  Cranberry 600 MG tablet, Take 1 tablet by mouth Daily., Disp: , Rfl:   •  diphenhydrAMINE (BENADRYL) 25 MG tablet, Take 25 mg by mouth 2 (Two) Times a Day As Needed., Disp: , Rfl:   •  pantoprazole (PROTONIX) 40 MG EC tablet, Take 1 tablet by mouth Daily., Disp: 90 tablet, Rfl: 0  •  glucose blood test strip, Use as instructed, Disp: 100 each, Rfl: 12  •  glucose monitor monitoring kit, 1 each 3 (Three) Times a Day As Needed (hypo/hyperglycemia)., Disp: 1 each, Rfl: 3    The following portions of the patient's history were reviewed and updated as appropriate: allergies, current medications, past family history, past medical history, past social history, past surgical history and problem list.    Review of Systems   Constitution: Negative for chills, decreased appetite, diaphoresis, fever, weakness, malaise/fatigue, night sweats, weight gain and weight loss.   HENT: Negative for congestion and nosebleeds.    Eyes: Negative for blurred vision, visual disturbance and visual halos.   Cardiovascular: Negative for chest pain, claudication, cyanosis, dyspnea on exertion, irregular heartbeat, leg swelling, near-syncope, orthopnea, palpitations, paroxysmal nocturnal dyspnea and syncope.   Respiratory: Negative for cough,  "hemoptysis, shortness of breath, sleep disturbances due to breathing, snoring, sputum production and wheezing.    Endocrine: Negative for cold intolerance, heat intolerance, polydipsia, polyphagia and polyuria.   Hematologic/Lymphatic: Does not bruise/bleed easily.   Skin: Negative for dry skin, itching and rash.   Musculoskeletal: Negative for falls, joint pain, joint swelling, muscle weakness and myalgias.   Gastrointestinal: Negative for bloating, abdominal pain, constipation, diarrhea, dysphagia, heartburn, melena, nausea and vomiting.   Genitourinary: Negative for dysuria, flank pain, hematuria and nocturia.   Neurological: Negative for difficulty with concentration, excessive daytime sleepiness, dizziness, headaches and loss of balance.   Psychiatric/Behavioral: Negative for altered mental status and depression. The patient is not nervous/anxious.    Allergic/Immunologic: Negative for environmental allergies.       Objective:     Vitals:    05/30/18 1353 05/30/18 1356   BP: 119/64 108/66   BP Location: Right arm Right arm   Patient Position: Sitting Standing   Pulse: 68 72   Resp: 16    Temp: 98.2 °F (36.8 °C)    TempSrc: Temporal Artery     SpO2: 94%    Weight: 77.3 kg (170 lb 6.4 oz)    Height: 160 cm (63\")          Physical Exam   Constitutional: She is oriented to person, place, and time. She appears well-developed and well-nourished. No distress.   HENT:   Head: Normocephalic and atraumatic.   Mouth/Throat: Oropharynx is clear and moist.   Eyes: Conjunctivae are normal. Pupils are equal, round, and reactive to light. No scleral icterus.   Neck: No hepatojugular reflux and no JVD present. Carotid bruit is not present. No tracheal deviation present. No thyromegaly present.   Cardiovascular: Normal rate, regular rhythm, normal heart sounds and intact distal pulses.  Exam reveals no friction rub.    No murmur heard.  Pulmonary/Chest: Effort normal and breath sounds normal.   Abdominal: Soft. Bowel sounds are " normal. She exhibits no distension. There is no tenderness.   Musculoskeletal: She exhibits no edema.   Lymphadenopathy:     She has no cervical adenopathy.   Neurological: She is alert and oriented to person, place, and time.   Skin: Skin is warm, dry and intact. No rash noted. No cyanosis or erythema. No pallor.   Psychiatric: She has a normal mood and affect. Her behavior is normal. Thought content normal.   Vitals reviewed.      Lab and Diagnostic Review:  Lab Results   Component Value Date    WBC 10.62 04/25/2018    HGB 13.0 04/25/2018    HCT 40.0 04/25/2018    MCV 92.6 04/25/2018     04/25/2018     Lab Results   Component Value Date    GLUCOSE 97 05/09/2018    BUN 15 05/09/2018    CREATININE 0.80 05/09/2018    EGFRIFNONA 72 05/09/2018    BCR 18.8 05/09/2018    K 4.2 05/09/2018    CO2 27.0 05/09/2018    CALCIUM 9.0 05/09/2018    ALBUMIN 4.60 04/22/2018    LABIL2 1.5 04/22/2018    AST 15 04/22/2018    ALT 13 04/22/2018     Lab Results   Component Value Date    TSH 2.883 04/10/2018     Lab Results   Component Value Date    CHOL 157 04/10/2018    CHLPL 122 09/25/2015    TRIG 110 04/10/2018    HDL 39 (L) 04/10/2018     04/10/2018     .  Assessment and Plan:         1. Palpitations    - Cardiac Event Monitor; MCT.  Have chosen event due to other testing planned and the heart monitor needs to be removed.  - Adult Transthoracic Echo Complete W/ Cont if Necessary Per Protocol; Future  - Ambulatory Referral to Cardiology.  May need cardiac clearance.    2. Dizziness    - Cardiac Event Monitor; Future  - Adult Transthoracic Echo Complete W/ Cont if Necessary Per Protocol; Future  - Ambulatory Referral to Cardiology    3. Shortness of breath  Hx of COPD current evaluation of lung nodule (? CA).  Pending echo may need stress (per cardiology)  - Cardiac Event Monitor; Future  - Adult Transthoracic Echo Complete W/ Cont if Necessary Per Protocol; Future  - Ambulatory Referral to Cardiology    4. Edema,  unspecified type  Stable Continue to monitor.  Keep legs elevated, use compression stockings.  No s/s HF/volume overload.      *Please note that portions of this note were completed with a voice recognition program. Efforts were made to edit the dictations, but occasionally words are mistranscribed.

## 2018-05-31 DIAGNOSIS — I10 ESSENTIAL HYPERTENSION: ICD-10-CM

## 2018-05-31 RX ORDER — AMLODIPINE BESYLATE 10 MG/1
TABLET ORAL
Qty: 30 TABLET | Refills: 0 | OUTPATIENT
Start: 2018-05-31

## 2018-06-04 ENCOUNTER — TELEPHONE (OUTPATIENT)
Dept: INTERNAL MEDICINE | Facility: CLINIC | Age: 63
End: 2018-06-04

## 2018-06-04 DIAGNOSIS — K21.9 GASTROESOPHAGEAL REFLUX DISEASE, ESOPHAGITIS PRESENCE NOT SPECIFIED: ICD-10-CM

## 2018-06-04 RX ORDER — PANTOPRAZOLE SODIUM 40 MG/1
40 TABLET, DELAYED RELEASE ORAL DAILY
Qty: 90 TABLET | Refills: 2 | Status: SHIPPED | OUTPATIENT
Start: 2018-06-04

## 2018-06-04 NOTE — TELEPHONE ENCOUNTER
----- Message from Hillary Zuleta sent at 6/4/2018  9:16 AM EDT -----  Contact: SELF  KERA BARNETT CALLING FOR A REFILL FOR PANTOPRAZOLE. SHE USES THE [facility] [place]. KERA CAN BE REACHED [phone]

## 2018-06-06 ENCOUNTER — HOSPITAL ENCOUNTER (OUTPATIENT)
Dept: PET IMAGING | Facility: HOSPITAL | Age: 63
Discharge: HOME | End: 2018-06-06
Attending: INTERNAL MEDICINE

## 2018-06-06 ENCOUNTER — HOSPITAL ENCOUNTER (OUTPATIENT)
Dept: PET IMAGING | Facility: HOSPITAL | Age: 63
Discharge: HOME | End: 2018-06-06
Attending: INTERNAL MEDICINE | Admitting: INTERNAL MEDICINE

## 2018-06-06 ENCOUNTER — HOSPITAL ENCOUNTER (OUTPATIENT)
Dept: MRI IMAGING | Facility: HOSPITAL | Age: 63
Discharge: HOME | End: 2018-06-06
Attending: INTERNAL MEDICINE

## 2018-06-06 DIAGNOSIS — C34.31 MALIGNANT NEOPLASM OF LOWER LOBE OF RIGHT LUNG (HCC): ICD-10-CM

## 2018-06-06 DIAGNOSIS — C50.912 INVASIVE DUCTAL CARCINOMA OF BREAST, LEFT (HCC): ICD-10-CM

## 2018-06-06 LAB — GLUCOSE BLDC GLUCOMTR-MCNC: 104 MG/DL (ref 70–130)

## 2018-06-06 PROCEDURE — A9577 INJ MULTIHANCE: HCPCS | Performed by: INTERNAL MEDICINE

## 2018-06-06 PROCEDURE — 0 GADOBENATE DIMEGLUMINE 529 MG/ML SOLUTION: Performed by: INTERNAL MEDICINE

## 2018-06-06 PROCEDURE — 0 FLUDEOXYGLUCOSE F18 SOLUTION: Performed by: INTERNAL MEDICINE

## 2018-06-06 PROCEDURE — 78816 PET IMAGE W/CT FULL BODY: CPT

## 2018-06-06 PROCEDURE — A9552 F18 FDG: HCPCS | Performed by: INTERNAL MEDICINE

## 2018-06-06 PROCEDURE — 82962 GLUCOSE BLOOD TEST: CPT

## 2018-06-06 PROCEDURE — 70553 MRI BRAIN STEM W/O & W/DYE: CPT

## 2018-06-06 RX ADMIN — GADOBENATE DIMEGLUMINE 15 ML: 529 INJECTION, SOLUTION INTRAVENOUS at 12:00

## 2018-06-06 RX ADMIN — FLUDEOXYGLUCOSE F18 1 DOSE: 300 INJECTION INTRAVENOUS at 13:12

## 2018-06-07 ENCOUNTER — TELEPHONE (OUTPATIENT)
Dept: INTERNAL MEDICINE | Facility: CLINIC | Age: 63
End: 2018-06-07

## 2018-06-07 ENCOUNTER — TELEPHONE (OUTPATIENT)
Dept: ONCOLOGY | Facility: CLINIC | Age: 63
End: 2018-06-07

## 2018-06-07 NOTE — TELEPHONE ENCOUNTER
Called patient and let her know that PET scan shows evidence of metastatic disease involving the bones.  Lung is not hypermetabolic on imaging.  I suspect this is all breast cancer.  Will discuss at tumor board tomorrow re: whether additional biopsy is needed.

## 2018-06-07 NOTE — TELEPHONE ENCOUNTER
----- Message from Elissa Connors sent at 6/7/2018  3:12 PM EDT -----  [phone]  REFILL ON ACCU CHECK SOFT CLICK [facility]

## 2018-06-08 ENCOUNTER — TELEPHONE (OUTPATIENT)
Dept: ONCOLOGY | Facility: CLINIC | Age: 63
End: 2018-06-08

## 2018-06-08 PROBLEM — C79.51 BONE METASTASES: Status: ACTIVE | Noted: 2018-06-08

## 2018-06-08 NOTE — TELEPHONE ENCOUNTER
Left VM: Patient has an apt in OP infusion following her clinic apt with Dr. Reji Madrid. Dr. Mendoza will discuss details of new medications at that visit.

## 2018-06-12 ENCOUNTER — APPOINTMENT (OUTPATIENT)
Dept: ONCOLOGY | Facility: HOSPITAL | Age: 63
End: 2018-06-12

## 2018-06-12 ENCOUNTER — SPECIALTY PHARMACY (OUTPATIENT)
Dept: ONCOLOGY | Facility: HOSPITAL | Age: 63
End: 2018-06-12

## 2018-06-12 DIAGNOSIS — C50.912 INVASIVE DUCTAL CARCINOMA OF BREAST, LEFT (HCC): Primary | ICD-10-CM

## 2018-06-12 DIAGNOSIS — R91.8 LUNG MASS: ICD-10-CM

## 2018-06-12 DIAGNOSIS — I48.91 ATRIAL FIBRILLATION, UNSPECIFIED TYPE (HCC): Primary | ICD-10-CM

## 2018-06-12 RX ORDER — ONDANSETRON HYDROCHLORIDE 8 MG/1
8 TABLET, FILM COATED ORAL 3 TIMES DAILY PRN
Qty: 30 TABLET | Refills: 5 | Status: SHIPPED | OUTPATIENT
Start: 2018-06-12

## 2018-06-12 RX ORDER — LETROZOLE 2.5 MG/1
2.5 TABLET, FILM COATED ORAL DAILY
Qty: 30 TABLET | Refills: 11 | Status: SHIPPED | OUTPATIENT
Start: 2018-06-12 | End: 2018-07-24 | Stop reason: HOSPADM

## 2018-06-12 NOTE — PROGRESS NOTES
Oral Chemotherapy Teaching      Patient Name/:  Betsy Chi   1955  Oral Chemotherapy Regimen:  Ibrance 125mg PO daily x 21 days, followed by 7 days off + Letrozole 2.5mg daily  Date Started Medication: pending medication acquisition    Initial Teaching Follow Up Comments     Safety     Storage instructions (away from children; away from heat/cold, sunlight, or moisture), handling - use of gloves (caregivers), washing hands after touching pills, managing waste     “How are you storing your medications?”, reminders on storage, proper handling (caregivers using gloves, washing hands, away from children, managing waste, etc.), disposal of medication with D/C or dosage change    Patient counseled on hazardous handling and storage precautions. Discussed plan to wash hands after handling. Caregivers to handle with latex gloves. Store at room temp, away from pets and children. Pt verbalized understanding.      Adherence      patient and/or caregiver on how to take medication, take with/without food, assess their adherence potential, stress importance of adherence, ways to manage adherence (pill boxes, phone reminders, calendars), what to do if miss a dose   “How are you taking your medication?” “How are you remembering to take your medication?”, “How many doses have you missed?”, determine reasons for non-adherence (not remembering, side effects, etc), ways to improve, overadherence? Remind patient of ways to improve/maintain adherence   Pt rescheduled appt due to ride. Discussed with patient plan to start Ibrance 125mg ( one capsule) by mouth once daily x 21 days, followed by 7 days off. Will submit script to [facility] to begin processing (typical turn around time ~ 7 days). Reveiwed plan to administer Ibrance in the morning with meals. Avoid grapefruit juice. Discussed plan to take Letrozole 2.5mg (one tablet) by mouth once daily,even during 7 days off of ibrance. Pt voiced understanding. Will mail  patient written information as well. Reviewed plan to start IV Zometa once appt rescheduled.      Side Effects/Adverse Reactions      patient on potential side effects, s/s, ways to manage, when to call MD/seek help     Determine if patient experiencing side effects, ways to manage  Discussed potential side effects including but not limited to N/V, fatigue, URI, myalgia, neutropenia, arthralgias, hot flashes, and night sweats.      Miscellaneous     Food interactions, DDIs, financial issues Determine if patient started any new medications since being placed on oral chemo (analyze for DDI) DDI: Atorvastatin and Ibrance co-administration may increase exposure to Atorvastatin, monitor for s/sx muscle cramps.      Additional Notes:  Discussed aforementioned material with patient over the phone. All questions and concerns addressed. Provided patient with my contact information and instructions to call should additional questions arise.

## 2018-06-13 ENCOUNTER — TELEPHONE (OUTPATIENT)
Dept: OTHER | Facility: HOSPITAL | Age: 63
End: 2018-06-13

## 2018-06-13 NOTE — TELEPHONE ENCOUNTER
Son called to verify information regarding PET scan patient had told him. Reviewed Dr. Mendoza's discussion with patient and relayed this info to son. He v/u. States he was told something a little different from patient and will talk with her about it today. Also states she missed her appointment with Dr. Mendoza yesterday but does plan to rtc. He knows to call with questions or concerns.

## 2018-06-14 ENCOUNTER — TELEPHONE (OUTPATIENT)
Dept: ONCOLOGY | Facility: CLINIC | Age: 63
End: 2018-06-14

## 2018-06-14 ENCOUNTER — SPECIALTY PHARMACY (OUTPATIENT)
Dept: ONCOLOGY | Facility: HOSPITAL | Age: 63
End: 2018-06-14

## 2018-06-14 DIAGNOSIS — C50.912 INVASIVE DUCTAL CARCINOMA OF BREAST, LEFT (HCC): ICD-10-CM

## 2018-06-14 DIAGNOSIS — R91.8 LUNG MASS: ICD-10-CM

## 2018-06-14 NOTE — TELEPHONE ENCOUNTER
Left VM: When she has a free minute, give me a call back and we can talk in more detail about the letrozole: medication information, delivery process, answer questions.

## 2018-06-14 NOTE — PROGRESS NOTES
Received notification from [facility], medication required fill through [facility]. [facility] and asked to mikaela as urgent.

## 2018-06-19 ENCOUNTER — APPOINTMENT (OUTPATIENT)
Dept: ONCOLOGY | Facility: HOSPITAL | Age: 63
End: 2018-06-19

## 2018-06-20 ENCOUNTER — TELEPHONE (OUTPATIENT)
Dept: ONCOLOGY | Facility: CLINIC | Age: 63
End: 2018-06-20

## 2018-06-20 LAB
FUNGUS WND CULT: NORMAL
MYCOBACTERIUM SPEC CULT: NORMAL
NIGHT BLUE STAIN TISS: NORMAL

## 2018-06-20 NOTE — TELEPHONE ENCOUNTER
Returned call to patient. Gave update: PA completed, result pending, will keep her and Henry updated.

## 2018-06-20 NOTE — TELEPHONE ENCOUNTER
----- Message from Jasmin Myers sent at 6/20/2018  9:18 AM EDT -----  Regarding: [facility] - LETROZOLE   Contact: [phone]  PATIENT CALLED AND IS VERY UPSET REGARDING HER MEDICATION LETROZOLE.     [facility]IN[facility] IN [place] SAID IT HASN'T BEEN CALLED IN BECAUSE INSURANCE HASN'T APPROVED IT. THEY WANTED HER TO PAY $75.00, BUT SHE CAN'T AFFORD THAT.     SHE SAID CORINNE HAS SUBMITTED SOMETHING TO OUR OFFICE BUT THEY NEVER RECEIVED A REPLY BACK.     PLEASE CALL PATIENT REGARDING HER MEDICATION

## 2018-06-22 ENCOUNTER — TELEPHONE (OUTPATIENT)
Dept: ONCOLOGY | Facility: HOSPITAL | Age: 63
End: 2018-06-22

## 2018-06-22 NOTE — TELEPHONE ENCOUNTER
Called and spoke with patient. Pt confirms having received Ibrance from [facility]. Reports she has not started medication d/t recent virus and temperature. Will plan to hold on starting up virus resolves. Pt reports she plans to call to reschedule appt missed this week d/t virus. States she has not received Letrozole, informed patient RN has completed a PA on 6/20/18. Recommend pt call [facility] and ask if they will re-run script to determine if PA has been approved. Noted to patient PA may take 24-72 hours to process by the insurance company. Pt voiced understanding.

## 2018-06-27 DIAGNOSIS — I10 ESSENTIAL HYPERTENSION: ICD-10-CM

## 2018-06-27 PROBLEM — H40.013 OPEN ANGLE WITH BORDERLINE FINDINGS AND LOW GLAUCOMA RISK IN BOTH EYES: Status: ACTIVE | Noted: 2018-06-27

## 2018-06-27 RX ORDER — AMLODIPINE BESYLATE 10 MG/1
TABLET ORAL
Qty: 30 TABLET | Refills: 0 | Status: ON HOLD | OUTPATIENT
Start: 2018-06-27 | End: 2018-07-24 | Stop reason: SDUPTHER

## 2018-06-27 RX ORDER — CARVEDILOL 12.5 MG/1
TABLET ORAL
Qty: 60 TABLET | Refills: 0 | Status: ON HOLD | OUTPATIENT
Start: 2018-06-27 | End: 2018-07-24 | Stop reason: SDUPTHER

## 2018-06-28 ENCOUNTER — TELEPHONE (OUTPATIENT)
Dept: ONCOLOGY | Facility: CLINIC | Age: 63
End: 2018-06-28

## 2018-06-28 NOTE — TELEPHONE ENCOUNTER
I attempted to reach the patient, left VM. Your letrozole is ready for pickup at [facility] with a $0 copay. I wanted to give patient the opportunity to reschedule the apt she missed due to not feeling well. Asked that she call back and schedule an apt with the phone room.

## 2018-07-02 ENCOUNTER — DOCUMENTATION (OUTPATIENT)
Dept: CARDIOLOGY | Facility: HOSPITAL | Age: 63
End: 2018-07-02

## 2018-07-02 PROBLEM — R00.2 PALPITATIONS: Status: ACTIVE | Noted: 2018-07-02

## 2018-07-02 NOTE — PROGRESS NOTES
Reviewed final event monitor results:.    30 day event monitor, 5/30/18: 2 event strips, one manually detected, 1 automatically detected both sinus rhythm.     has follow-up with Dr. Still scheduled for 7/25/18 to discuss results and continue management.

## 2018-07-03 ENCOUNTER — TELEPHONE (OUTPATIENT)
Dept: INTERNAL MEDICINE | Facility: CLINIC | Age: 63
End: 2018-07-03

## 2018-07-03 NOTE — TELEPHONE ENCOUNTER
Patient called back she wanted me to transfer her to our scheduling person to schedule .  I transferred call to Erika.

## 2018-07-06 ENCOUNTER — TELEPHONE (OUTPATIENT)
Dept: ONCOLOGY | Facility: CLINIC | Age: 63
End: 2018-07-06

## 2018-07-06 NOTE — TELEPHONE ENCOUNTER
I called to touch base with the patient. She has picked up her Letrozole, but has not started taking it because she has not been feeling well. I asked if she would come in to see Dr. Mendoza. Scheduled pt for Wednesday 7/11 @10:30. She repeated the correct date and time and is writing it down.

## 2018-07-11 ENCOUNTER — OFFICE VISIT (OUTPATIENT)
Dept: ONCOLOGY | Facility: CLINIC | Age: 63
End: 2018-07-11

## 2018-07-11 ENCOUNTER — HOSPITAL ENCOUNTER (INPATIENT)
Facility: HOSPITAL | Age: 63
LOS: 13 days | Discharge: HOSPICE | End: 2018-07-24
Attending: HOSPITALIST | Admitting: INTERNAL MEDICINE

## 2018-07-11 ENCOUNTER — APPOINTMENT (OUTPATIENT)
Dept: GENERAL RADIOLOGY | Facility: HOSPITAL | Age: 63
End: 2018-07-11

## 2018-07-11 ENCOUNTER — APPOINTMENT (OUTPATIENT)
Dept: CARDIOLOGY | Facility: HOSPITAL | Age: 63
End: 2018-07-11
Attending: FAMILY MEDICINE

## 2018-07-11 ENCOUNTER — APPOINTMENT (OUTPATIENT)
Dept: CT IMAGING | Facility: HOSPITAL | Age: 63
End: 2018-07-11

## 2018-07-11 VITALS
SYSTOLIC BLOOD PRESSURE: 113 MMHG | OXYGEN SATURATION: 96 % | DIASTOLIC BLOOD PRESSURE: 68 MMHG | HEIGHT: 63 IN | BODY MASS INDEX: 32.07 KG/M2 | WEIGHT: 181 LBS | HEART RATE: 91 BPM | TEMPERATURE: 98.5 F | RESPIRATION RATE: 36 BRPM

## 2018-07-11 DIAGNOSIS — C79.51 BONE METASTASES: ICD-10-CM

## 2018-07-11 DIAGNOSIS — Z74.09 IMPAIRED FUNCTIONAL MOBILITY, BALANCE, GAIT, AND ENDURANCE: ICD-10-CM

## 2018-07-11 DIAGNOSIS — C50.912 INVASIVE DUCTAL CARCINOMA OF BREAST, LEFT (HCC): Primary | ICD-10-CM

## 2018-07-11 DIAGNOSIS — R13.10 DYSPHAGIA, UNSPECIFIED TYPE: Primary | ICD-10-CM

## 2018-07-11 LAB
ALBUMIN SERPL-MCNC: 4.94 G/DL (ref 3.2–4.8)
ALBUMIN/GLOB SERPL: 1.7 G/DL (ref 1.5–2.5)
ALP SERPL-CCNC: 138 U/L (ref 25–100)
ALT SERPL W P-5'-P-CCNC: 28 U/L (ref 7–40)
AMYLASE SERPL-CCNC: 59 U/L (ref 30–118)
ANION GAP SERPL CALCULATED.3IONS-SCNC: 8 MMOL/L (ref 3–11)
ARTERIAL PATENCY WRIST A: ABNORMAL
AST SERPL-CCNC: 24 U/L (ref 0–33)
ATMOSPHERIC PRESS: ABNORMAL MMHG
BACTERIA UR QL AUTO: NORMAL /HPF
BASE EXCESS BLDA CALC-SCNC: -0.8 MMOL/L (ref 0–2)
BASOPHILS # BLD AUTO: 0.03 10*3/MM3 (ref 0–0.2)
BASOPHILS NFR BLD AUTO: 0.4 % (ref 0–1)
BDY SITE: ABNORMAL
BILIRUB SERPL-MCNC: 0.8 MG/DL (ref 0.3–1.2)
BILIRUB UR QL STRIP: NEGATIVE
BNP SERPL-MCNC: 60 PG/ML (ref 0–100)
BUN BLD-MCNC: 15 MG/DL (ref 9–23)
BUN/CREAT SERPL: 14 (ref 7–25)
CALCIUM SPEC-SCNC: 9.5 MG/DL (ref 8.7–10.4)
CHLORIDE SERPL-SCNC: 107 MMOL/L (ref 99–109)
CLARITY UR: CLEAR
CO2 BLDA-SCNC: 24.8 MMOL/L (ref 22–33)
CO2 SERPL-SCNC: 27 MMOL/L (ref 20–31)
COHGB MFR BLD: 1.3 % (ref 0–2)
COLOR UR: YELLOW
CREAT BLD-MCNC: 1.07 MG/DL (ref 0.6–1.3)
D DIMER PPP FEU-MCNC: 0.8 MG/L (FEU) (ref 0–0.5)
D-LACTATE SERPL-SCNC: 0.5 MMOL/L (ref 0.5–2)
DEPRECATED RDW RBC AUTO: 46.5 FL (ref 37–54)
EOSINOPHIL # BLD AUTO: 0.09 10*3/MM3 (ref 0–0.3)
EOSINOPHIL NFR BLD AUTO: 1.2 % (ref 0–3)
ERYTHROCYTE [DISTWIDTH] IN BLOOD BY AUTOMATED COUNT: 13.7 % (ref 11.3–14.5)
GFR SERPL CREATININE-BSD FRML MDRD: 52 ML/MIN/1.73
GLOBULIN UR ELPH-MCNC: 2.9 GM/DL
GLUCOSE BLD-MCNC: 102 MG/DL (ref 70–100)
GLUCOSE BLDC GLUCOMTR-MCNC: 103 MG/DL (ref 70–130)
GLUCOSE BLDC GLUCOMTR-MCNC: 105 MG/DL (ref 70–130)
GLUCOSE BLDC GLUCOMTR-MCNC: 90 MG/DL (ref 70–130)
GLUCOSE UR STRIP-MCNC: NEGATIVE MG/DL
HCO3 BLDA-SCNC: 23.6 MMOL/L (ref 20–26)
HCT VFR BLD AUTO: 42.7 % (ref 34.5–44)
HCT VFR BLD CALC: 42.5 %
HGB BLD-MCNC: 14.1 G/DL (ref 11.5–15.5)
HGB BLDA-MCNC: 13.9 G/DL (ref 14–18)
HGB UR QL STRIP.AUTO: NEGATIVE
HOROWITZ INDEX BLD+IHG-RTO: 28 %
HYALINE CASTS UR QL AUTO: NORMAL /LPF
IMM GRANULOCYTES # BLD: 0.01 10*3/MM3 (ref 0–0.03)
IMM GRANULOCYTES NFR BLD: 0.1 % (ref 0–0.6)
KETONES UR QL STRIP: NEGATIVE
LEUKOCYTE ESTERASE UR QL STRIP.AUTO: ABNORMAL
LIPASE SERPL-CCNC: 48 U/L (ref 6–51)
LYMPHOCYTES # BLD AUTO: 1.63 10*3/MM3 (ref 0.6–4.8)
LYMPHOCYTES NFR BLD AUTO: 22.2 % (ref 24–44)
MCH RBC QN AUTO: 30.7 PG (ref 27–31)
MCHC RBC AUTO-ENTMCNC: 33 G/DL (ref 32–36)
MCV RBC AUTO: 93 FL (ref 80–99)
METHGB BLD QL: 0.8 % (ref 0–1.5)
MODALITY: ABNORMAL
MONOCYTES # BLD AUTO: 0.72 10*3/MM3 (ref 0–1)
MONOCYTES NFR BLD AUTO: 9.8 % (ref 0–12)
NEUTROPHILS # BLD AUTO: 4.87 10*3/MM3 (ref 1.5–8.3)
NEUTROPHILS NFR BLD AUTO: 66.3 % (ref 41–71)
NITRITE UR QL STRIP: NEGATIVE
OXYHGB MFR BLDV: 94.4 % (ref 94–99)
PCO2 BLDA: 37.6 MM HG (ref 35–45)
PH BLDA: 7.41 PH UNITS (ref 7.35–7.45)
PH UR STRIP.AUTO: 5.5 [PH] (ref 5–8)
PLATELET # BLD AUTO: 230 10*3/MM3 (ref 150–450)
PMV BLD AUTO: 9.8 FL (ref 6–12)
PO2 BLDA: 79 MM HG (ref 83–108)
POTASSIUM BLD-SCNC: 4.6 MMOL/L (ref 3.5–5.5)
PROCALCITONIN SERPL-MCNC: <0.05 NG/ML
PROT SERPL-MCNC: 7.8 G/DL (ref 5.7–8.2)
PROT UR QL STRIP: NEGATIVE
RBC # BLD AUTO: 4.59 10*6/MM3 (ref 3.89–5.14)
RBC # UR: NORMAL /HPF
REF LAB TEST METHOD: NORMAL
SODIUM BLD-SCNC: 142 MMOL/L (ref 132–146)
SP GR UR STRIP: 1.01 (ref 1–1.03)
SQUAMOUS #/AREA URNS HPF: NORMAL /HPF
TROPONIN I SERPL-MCNC: <0.006 NG/ML
TROPONIN I SERPL-MCNC: <0.006 NG/ML
TSH SERPL DL<=0.05 MIU/L-ACNC: 2.63 MIU/ML (ref 0.35–5.35)
UROBILINOGEN UR QL STRIP: ABNORMAL
WBC NRBC COR # BLD: 7.35 10*3/MM3 (ref 3.5–10.8)
WBC UR QL AUTO: NORMAL /HPF

## 2018-07-11 PROCEDURE — 83605 ASSAY OF LACTIC ACID: CPT | Performed by: FAMILY MEDICINE

## 2018-07-11 PROCEDURE — 0 IOPAMIDOL PER 1 ML: Performed by: INTERNAL MEDICINE

## 2018-07-11 PROCEDURE — 82805 BLOOD GASES W/O2 SATURATION: CPT | Performed by: FAMILY MEDICINE

## 2018-07-11 PROCEDURE — 36600 WITHDRAWAL OF ARTERIAL BLOOD: CPT | Performed by: FAMILY MEDICINE

## 2018-07-11 PROCEDURE — 99223 1ST HOSP IP/OBS HIGH 75: CPT | Performed by: FAMILY MEDICINE

## 2018-07-11 PROCEDURE — 94799 UNLISTED PULMONARY SVC/PX: CPT

## 2018-07-11 PROCEDURE — 63710000001 INSULIN LISPRO (HUMAN) PER 5 UNITS: Performed by: FAMILY MEDICINE

## 2018-07-11 PROCEDURE — 93010 ELECTROCARDIOGRAM REPORT: CPT | Performed by: INTERNAL MEDICINE

## 2018-07-11 PROCEDURE — 81001 URINALYSIS AUTO W/SCOPE: CPT | Performed by: FAMILY MEDICINE

## 2018-07-11 PROCEDURE — 82150 ASSAY OF AMYLASE: CPT | Performed by: FAMILY MEDICINE

## 2018-07-11 PROCEDURE — 83880 ASSAY OF NATRIURETIC PEPTIDE: CPT | Performed by: FAMILY MEDICINE

## 2018-07-11 PROCEDURE — 25010000002 FUROSEMIDE PER 20 MG: Performed by: FAMILY MEDICINE

## 2018-07-11 PROCEDURE — 94760 N-INVAS EAR/PLS OXIMETRY 1: CPT

## 2018-07-11 PROCEDURE — 93005 ELECTROCARDIOGRAM TRACING: CPT | Performed by: HOSPITALIST

## 2018-07-11 PROCEDURE — 85025 COMPLETE CBC W/AUTO DIFF WBC: CPT | Performed by: FAMILY MEDICINE

## 2018-07-11 PROCEDURE — 84484 ASSAY OF TROPONIN QUANT: CPT | Performed by: FAMILY MEDICINE

## 2018-07-11 PROCEDURE — 25010000002 DIPHENHYDRAMINE PER 50 MG: Performed by: INTERNAL MEDICINE

## 2018-07-11 PROCEDURE — 25010000002 HEPARIN (PORCINE) PER 1000 UNITS: Performed by: FAMILY MEDICINE

## 2018-07-11 PROCEDURE — 94640 AIRWAY INHALATION TREATMENT: CPT

## 2018-07-11 PROCEDURE — 83690 ASSAY OF LIPASE: CPT | Performed by: FAMILY MEDICINE

## 2018-07-11 PROCEDURE — 99215 OFFICE O/P EST HI 40 MIN: CPT | Performed by: INTERNAL MEDICINE

## 2018-07-11 PROCEDURE — 82962 GLUCOSE BLOOD TEST: CPT

## 2018-07-11 PROCEDURE — 80053 COMPREHEN METABOLIC PANEL: CPT | Performed by: FAMILY MEDICINE

## 2018-07-11 PROCEDURE — 84443 ASSAY THYROID STIM HORMONE: CPT | Performed by: FAMILY MEDICINE

## 2018-07-11 PROCEDURE — 71045 X-RAY EXAM CHEST 1 VIEW: CPT

## 2018-07-11 PROCEDURE — 25010000002 LORAZEPAM PER 2 MG: Performed by: FAMILY MEDICINE

## 2018-07-11 PROCEDURE — 85379 FIBRIN DEGRADATION QUANT: CPT | Performed by: FAMILY MEDICINE

## 2018-07-11 PROCEDURE — 84145 PROCALCITONIN (PCT): CPT | Performed by: FAMILY MEDICINE

## 2018-07-11 PROCEDURE — 71275 CT ANGIOGRAPHY CHEST: CPT

## 2018-07-11 RX ORDER — PANTOPRAZOLE SODIUM 40 MG/1
40 TABLET, DELAYED RELEASE ORAL
Status: DISCONTINUED | OUTPATIENT
Start: 2018-07-11 | End: 2018-07-24 | Stop reason: HOSPADM

## 2018-07-11 RX ORDER — DEXTROSE MONOHYDRATE 25 G/50ML
25 INJECTION, SOLUTION INTRAVENOUS
Status: DISCONTINUED | OUTPATIENT
Start: 2018-07-11 | End: 2018-07-21

## 2018-07-11 RX ORDER — NICOTINE POLACRILEX 4 MG
15 LOZENGE BUCCAL
Status: DISCONTINUED | OUTPATIENT
Start: 2018-07-11 | End: 2018-07-21

## 2018-07-11 RX ORDER — SODIUM CHLORIDE 0.9 % (FLUSH) 0.9 %
1-10 SYRINGE (ML) INJECTION AS NEEDED
Status: DISCONTINUED | OUTPATIENT
Start: 2018-07-11 | End: 2018-07-24 | Stop reason: HOSPADM

## 2018-07-11 RX ORDER — CARVEDILOL 12.5 MG/1
12.5 TABLET ORAL 2 TIMES DAILY WITH MEALS
Status: DISCONTINUED | OUTPATIENT
Start: 2018-07-11 | End: 2018-07-11

## 2018-07-11 RX ORDER — LORAZEPAM 2 MG/ML
0.5 INJECTION INTRAMUSCULAR ONCE
Status: COMPLETED | OUTPATIENT
Start: 2018-07-11 | End: 2018-07-11

## 2018-07-11 RX ORDER — DIPHENHYDRAMINE HYDROCHLORIDE 50 MG/ML
25 INJECTION INTRAMUSCULAR; INTRAVENOUS EVERY 6 HOURS PRN
Status: DISCONTINUED | OUTPATIENT
Start: 2018-07-11 | End: 2018-07-24 | Stop reason: HOSPADM

## 2018-07-11 RX ORDER — HEPARIN SODIUM 5000 [USP'U]/ML
5000 INJECTION, SOLUTION INTRAVENOUS; SUBCUTANEOUS EVERY 8 HOURS SCHEDULED
Status: DISCONTINUED | OUTPATIENT
Start: 2018-07-11 | End: 2018-07-24 | Stop reason: HOSPADM

## 2018-07-11 RX ORDER — ONDANSETRON 2 MG/ML
4 INJECTION INTRAMUSCULAR; INTRAVENOUS EVERY 6 HOURS PRN
Status: DISCONTINUED | OUTPATIENT
Start: 2018-07-11 | End: 2018-07-24 | Stop reason: HOSPADM

## 2018-07-11 RX ORDER — CARVEDILOL 3.12 MG/1
3.12 TABLET ORAL 2 TIMES DAILY WITH MEALS
Status: DISCONTINUED | OUTPATIENT
Start: 2018-07-11 | End: 2018-07-24 | Stop reason: HOSPADM

## 2018-07-11 RX ORDER — ATORVASTATIN CALCIUM 20 MG/1
20 TABLET, FILM COATED ORAL NIGHTLY
Status: DISCONTINUED | OUTPATIENT
Start: 2018-07-11 | End: 2018-07-24 | Stop reason: HOSPADM

## 2018-07-11 RX ORDER — IPRATROPIUM BROMIDE AND ALBUTEROL SULFATE 2.5; .5 MG/3ML; MG/3ML
3 SOLUTION RESPIRATORY (INHALATION)
Status: DISCONTINUED | OUTPATIENT
Start: 2018-07-11 | End: 2018-07-18

## 2018-07-11 RX ORDER — LETROZOLE 2.5 MG/1
2.5 TABLET, FILM COATED ORAL DAILY
Status: DISCONTINUED | OUTPATIENT
Start: 2018-07-11 | End: 2018-07-24 | Stop reason: HOSPADM

## 2018-07-11 RX ORDER — FUROSEMIDE 10 MG/ML
40 INJECTION INTRAMUSCULAR; INTRAVENOUS ONCE
Status: COMPLETED | OUTPATIENT
Start: 2018-07-11 | End: 2018-07-11

## 2018-07-11 RX ORDER — DIPHENHYDRAMINE HCL 25 MG
25 CAPSULE ORAL 2 TIMES DAILY PRN
COMMUNITY

## 2018-07-11 RX ADMIN — IPRATROPIUM BROMIDE AND ALBUTEROL SULFATE 3 ML: 2.5; .5 SOLUTION RESPIRATORY (INHALATION) at 23:05

## 2018-07-11 RX ADMIN — CARVEDILOL 3.12 MG: 3.12 TABLET, FILM COATED ORAL at 17:15

## 2018-07-11 RX ADMIN — FUROSEMIDE 40 MG: 10 INJECTION, SOLUTION INTRAMUSCULAR; INTRAVENOUS at 13:58

## 2018-07-11 RX ADMIN — DIPHENHYDRAMINE HYDROCHLORIDE 25 MG: 50 INJECTION INTRAMUSCULAR; INTRAVENOUS at 16:59

## 2018-07-11 RX ADMIN — LETROZOLE 2.5 MG: 2.5 TABLET, FILM COATED ORAL at 17:15

## 2018-07-11 RX ADMIN — IOPAMIDOL 95 ML: 755 INJECTION, SOLUTION INTRAVENOUS at 19:00

## 2018-07-11 RX ADMIN — IPRATROPIUM BROMIDE AND ALBUTEROL SULFATE 3 ML: 2.5; .5 SOLUTION RESPIRATORY (INHALATION) at 18:51

## 2018-07-11 RX ADMIN — ATORVASTATIN CALCIUM 20 MG: 20 TABLET, FILM COATED ORAL at 21:50

## 2018-07-11 RX ADMIN — HEPARIN SODIUM 5000 UNITS: 5000 INJECTION, SOLUTION INTRAVENOUS; SUBCUTANEOUS at 14:07

## 2018-07-11 RX ADMIN — IPRATROPIUM BROMIDE AND ALBUTEROL SULFATE 3 ML: 2.5; .5 SOLUTION RESPIRATORY (INHALATION) at 15:52

## 2018-07-11 RX ADMIN — HEPARIN SODIUM 5000 UNITS: 5000 INJECTION, SOLUTION INTRAVENOUS; SUBCUTANEOUS at 21:50

## 2018-07-11 RX ADMIN — DIPHENHYDRAMINE HYDROCHLORIDE 25 MG: 50 INJECTION INTRAMUSCULAR; INTRAVENOUS at 17:00

## 2018-07-11 RX ADMIN — LORAZEPAM 0.5 MG: 2 INJECTION INTRAMUSCULAR; INTRAVENOUS at 14:03

## 2018-07-11 NOTE — SIGNIFICANT NOTE
Patient's prior contrast reaction is questionable.  Apparently she tolerated the IV contrast with her last CTA in April with only some nonspecific symptoms in the days-weeks following.  Will use option 3 premedication with 50mg IV benadryl prior to CT angiogram and forego steroids given high risk for PE requiring timely diagnosis.

## 2018-07-11 NOTE — PROGRESS NOTES
PROBLEM LIST:  1. lC1K3K5 invasive ductal carcinoma of the left breast, ER+, NJ+, Her2 negative  A) presented with a palpable mass in the left breast as well as skin thickening.  biopsy showed multifocal low grade IDC.  Skin biopsy negative.  B) PET/CT 6/6/18 showed widespread bony metastatic disease  2. History of right breast cancer 2001, treated with lumpectomy and SLNB, ? CMF, and adjuvant hormonal therapy  3. Anxiety/depression  4. Rheumatoid arthritis  5. Asthma  6. CHF  7. COPD  8. Diabetes mellitus type 2  9. GERD  10. HTN  11. HL  12. CVA  13. Tobacco abuse  14. choriocarcinoma  15. Fibromyalgia  16. Atrial fibrillation    Subjective     HISTORY OF PRESENT ILLNESS:   Betsy Chi returns for follow-up.   Over the past month she has missed a few appointments.  She says she is just hoping she would get better but she has not.  She has edema and has actually gained weight even though she is eating poorly.  She is vomiting whenever she tries to eat food.  Sometimes it feels like food gets caught in her throat but other times it will go down.  She feels very weak and tired.  She is short of breath.  She is emotional and crying today.  She has gotten her medications but has not started taking anything yet.    Past Medical History, Past Surgical History, Social History, Family History have been reviewed and are without significant changes except as mentioned.    Review of Systems   A comprehensive 14 point review of systems was performed and was negative except as mentioned.    Medications:  The current medication list was reviewed in the EMR    ALLERGIES:    Allergies   Allergen Reactions   • Ampicillin Anaphylaxis   • Erythromycin Anaphylaxis   • Morphine And Related Anaphylaxis   • Oxycodone-Acetaminophen Anaphylaxis   • Oxycodone-Aspirin Anaphylaxis   • Penicillins Anaphylaxis   • Sulfa Antibiotics Swelling and Rash   • Aspirin GI Bleeding   • Codeine Swelling   • Contrast Dye Swelling   • Ibuprofen  "GI Bleeding   • Latex Arrhythmia   • Propoxyphene Nausea And Vomiting   • Saccharin Nausea And Vomiting   • Tramadol Swelling       Objective      /68 Comment: RUE  Pulse 91   Temp 98.5 °F (36.9 °C) (Temporal Artery )   Resp (!) 36   Ht 160 cm (63\")   Wt 82.1 kg (181 lb)   SpO2 96% Comment: RA  BMI 32.06 kg/m²      Performance Status: 3    General: well appearing female in no acute distress  Neuro: alert and oriented  HEENT: sclera anicteric, oropharynx clear  Lymphatics: no cervical, supraclavicular, or axillary adenopathy  Cardiovascular: regular rate and rhythm, no murmurs  Lungs: clear to auscultation bilaterally  Breast: The left breast is swollen, with erythema and thickening of the skin.  It is tender to palpation at about 5:00 in the inframammary fold.  There is a firm mass here.  Abdomen: soft, nontender, nondistended.  No palpable organomegaly  Extremeties:2+ bilateral LE edema  Skin: no rashes, lesions, bruising, or petechiae  Psych: mood and affect appropriate            Assessment/Plan   Betsy Chi is a 62 y.o. year old female with metastatic ER positive breast cancer who returns for follow-up.  Her PET scan from last month confirmed metastatic disease and she has not yet started any treatment.  She has multiple symptoms of concern today.    Shortness of breath: Her oxygen saturations are normal on room air and her lungs sound clear, but she is tachypneic.  She does have a history of heart failure and has peripheral edema.  I anticipate she may need diuresis.    Abdominal pain: She reports pain in her mid abdomen with coughing or bending over.  I reviewed her recent PET scan and I do not see any abnormalities in the area of concern.    Vomiting after meals: This is unexplained.  I think this needs further evaluation.    Pain: She has pain likely related to her metastatic cancer.  She needs to be started on adequate pain medication.    Metastatic breast cancer.  I have prescribed " letrozole and Ibrance.  She has these medications but has not started taking them.  I would recommend that she start the letrozole now we'll hold the Ibrance until her GI issues have been addressed.  I will plan to give her a dose of Zometa during her stay.    Because of the multiple complicated issues, I recommended admission for evaluation of these complaints.  She is agreeable to this.  I will contact the hospitalist service.                  Visit time was 45 minutes, greater than 50% spent in counseling      Mary Mendoza MD  [facility]    7/11/2018          CC:

## 2018-07-11 NOTE — PLAN OF CARE
Problem: Patient Care Overview  Goal: Plan of Care Review  Outcome: Ongoing (interventions implemented as appropriate)    Goal: Individualization and Mutuality  Outcome: Ongoing (interventions implemented as appropriate)    Goal: Discharge Needs Assessment  Outcome: Ongoing (interventions implemented as appropriate)    Goal: Interprofessional Rounds/Family Conf  Outcome: Ongoing (interventions implemented as appropriate)      Problem: Skin Injury Risk (Adult)  Goal: Identify Related Risk Factors and Signs and Symptoms  Outcome: Ongoing (interventions implemented as appropriate)    Goal: Skin Health and Integrity  Outcome: Ongoing (interventions implemented as appropriate)

## 2018-07-11 NOTE — H&P
"    [facility]  HISTORY AND PHYSICAL    Patient Name: Betsy Chi  : 1955  MRN: [id number]  Primary Care Physician: PRAFUL Wheatley    Subjective   Subjective     Chief Complaint:  soa and swelling     HPI:  Betsy Chi is a 62 y.o. female with a history of hypertension, dyslipidemia, diabetes, atrial fibrillation, COPD and left breast cancer  Admitted from Dr.Amy Mendoza's office today due to increased sob and work of breathing. Per pt and friend at [facility]idel she has had increased cp as well as edema worsening since she was in the hospital in may 2018.  She states that her legs feel very tight and she becomes very sob when she lays flat.  She denies wheeze but states she has been coughing up clear phlegm.  She denies fever or chills. Admits to intermittent chest pain feels like stabbing through to her back. Non radiating and gets better. As well as nausea and vomiting.  Denies syncope.  She arrives to the room today anxious and tearful.  Says she just got her event monitor off last week but hasn't heard and states that she didn't do it right because she didn't understand directions.  She is not on any treatment yet for her breast cancer nor is she on any pain meds at home. She does have abd pain but she attributes it to her \"gallbladder\".     Review of Systems   Gen- No fevers, chills  CV- pos  chest pain, pos palpitations  Resp- pos cough, pos dyspnea  GI- No N/V/D,pos  abd pain    Otherwise 10-system ROS reviewed and is negative except as mentioned in the HPI.    Personal History     Past Medical History:   Diagnosis Date   • Arthritis    • Asthma    • Atrial fibrillation (CMS/HCC)    • Breast cancer (CMS/HCC)    • CHF (congestive heart failure) (CMS/HCC)    • Chronic kidney disease (CKD)    • COPD (chronic obstructive pulmonary disease) (CMS/HCC)    • CVA (cerebral vascular accident) (CMS/HCC)    • Diabetes mellitus, type 2 (CMS/HCC)    • GERD " (gastroesophageal reflux disease)    • Hypercholesteremia    • Hypertension    • Myocardial infarction    • Osteoarthritis    • Psoriatic arthritis (CMS/HCC)    • Pulmonary embolism (CMS/HCC)    • Rheumatoid arthritis (CMS/HCC)    • Sciatica    • Smoker    • UTI (urinary tract infection)        Past Surgical History:   Procedure Laterality Date   • BLADDER SURGERY     • BREAST BIOPSY     • BREAST LUMPECTOMY     • BRONCHOSCOPY N/A 5/9/2018    Procedure: BRONCHOSCOPY WITH ENDOBRONCHIAL ULTRASOUND AND NAVIGATION WITH FLUORO;  Surgeon: Nixon Mulligan MD;  Location: [facility];  Service: Pulmonary   • HYSTERECTOMY     • OOPHORECTOMY     • TUMOR REMOVAL      fallopian tube   • US GUIDED FINE NEEDLE ASPIRATION  5/3/2018       Family History: family history includes Aneurysm in her father and mother; Breast cancer in her cousin; Cancer in her father and mother.     Social History:  reports that she has been smoking Cigarettes.  She has a 11.50 pack-year smoking history. She has never used smokeless tobacco. She reports that she does not drink alcohol or use drugs.  Social History     Social History Narrative    The patient lives with her niece in [place].    Caffeine: sometimes a pot of coffee and sometimes none.           Medications:  Prescriptions Prior to Admission   Medication Sig Dispense Refill Last Dose   • albuterol (PROVENTIL HFA;VENTOLIN HFA) 108 (90 Base) MCG/ACT inhaler Inhale 2 puffs Every 4 (Four) Hours As Needed for Wheezing. 1 inhaler 2 Taking   • aluminum acetate (DOMEBORO) pack packet Apply  topically 3 (Three) Times a Day As Needed (psoriatic arthritis). 100 each 6 Taking   • amLODIPine (NORVASC) 10 MG tablet TAKE ONE TABLET BY MOUTH DAILY 30 tablet 0    • atorvastatin (LIPITOR) 20 MG tablet Take 1 tablet by mouth Every Night. 90 tablet 0 Taking   • carvedilol (COREG) 12.5 MG tablet TAKE ONE TABLET BY MOUTH TWICE A DAY WITH MEALS 60 tablet 0    • Cranberry 600 MG tablet Take 1 tablet by  mouth Daily.   Taking   • diphenhydrAMINE (BENADRYL) 25 MG tablet Take 25 mg by mouth 2 (Two) Times a Day As Needed.   Taking   • glucose blood test strip Use as instructed 100 each 12 Taking   • glucose monitor monitoring kit 1 each 3 (Three) Times a Day As Needed (hypo/hyperglycemia). 1 each 3 Taking   • Lancets (ACCU-CHEK SOFT TOUCH) lancets Three times daily and as needed 100 each 12    • letrozole (FEMARA) 2.5 MG tablet Take 1 tablet by mouth Daily. 30 tablet 11    • ondansetron (ZOFRAN) 8 MG tablet Take 1 tablet by mouth 3 (Three) Times a Day As Needed for Nausea or Vomiting. 30 tablet 5    • pantoprazole (PROTONIX) 40 MG EC tablet Take 1 tablet by mouth Daily. 90 tablet 2        Allergies   Allergen Reactions   • Ampicillin Anaphylaxis   • Erythromycin Anaphylaxis   • Morphine And Related Anaphylaxis   • Oxycodone-Acetaminophen Anaphylaxis   • Oxycodone-Aspirin Anaphylaxis   • Penicillins Anaphylaxis   • Sulfa Antibiotics Swelling and Rash   • Aspirin GI Bleeding   • Codeine Swelling   • Contrast Dye Swelling   • Ibuprofen GI Bleeding   • Latex Arrhythmia   • Propoxyphene Nausea And Vomiting   • Saccharin Nausea And Vomiting   • Tramadol Swelling       Objective   Objective     Vital Signs:   Temp:  [98.5 °F (36.9 °C)] 98.5 °F (36.9 °C)  Heart Rate:  [] 100  Resp:  [24-36] 24  BP: (113-120)/(68-78) 120/78        Physical Exam   Constitutional: sitting up in chair, anxious, tearful, awake, alert  Eyes: PERRLA, sclerae anicteric, no conjunctival injection  HENT: NCAT, mucous membranes moist, poor dentition   Neck: Supple, no thyromegaly, no lymphadenopathy, trachea midline  Respiratory: Clear to auscultation bilaterally, tachypnea, no wheeze, dimished at bases   Cardiovascular: RRR, no murmurs, rubs, or gallops, palpable pedal pulses bilaterally  Gastrointestinal: Positive bowel sounds, soft, nontender, nondistended  Musculoskeletal: +2 bilateral ankle edema, mild clubbing or cyanosis to  extremities  Psychiatric: Anxious, cooperative  Neurologic: Oriented x 3, strength symmetric in all extremities, Cranial Nerves grossly intact to confrontation, speech clear  Skin: No rashes      Results Reviewed:  I have personally reviewed current lab, radiology, and data and agree.      Results from last 7 days  Lab Units 07/11/18  1308   WBC 10*3/mm3 7.35   HEMOGLOBIN g/dL 14.1   HEMATOCRIT % 42.7   PLATELETS 10*3/mm3 230           Invalid input(s):  ALKPHOS, TROPONININT  CrCl cannot be calculated (Patient's most recent lab result is older than the maximum 30 days allowed.).  Brief Urine Lab Results  (Last result in the past 365 days)      Color   Clarity   Blood   Leuk Est   Nitrite   Protein   CREAT   Urine HCG        05/07/18 1101 Yellow Hazy(A) Negative 25 Vika/ul(A) Negative Negative             No results found for: BNP  Imaging Results (last 24 hours)     Procedure Component Value Units Date/Time    XR Chest 1 View [[id number]] Updated:  07/11/18 1329        Results for orders placed during the hospital encounter of 02/05/16   SCANNED - ECHOCARDIOGRAM       Assessment/Plan   Assessment / Plan     Hospital Problem List     Acute on chronic hypoxemic respiratory failure not requiring ventilatory support    Mixed anxiety depressive disorder    Atrial fibrillation (CMS/HCC)    Hepatic cirrhosis (CMS/HCC)    Type 2 diabetes mellitus (CMS/HCC)    Dyslipidemia    Hypertension    Pulmonary nodule right lower lobe medially (2 x 1.5 cm).    Overview Signed 5/22/2017  1:30 PM by Janine Mckeon PA-C     Description: A.  CT scan of the chest February 11 thousand 16 reports a noncalcified 8.5-9 mm nodule in the medial portion of the right lower lobe.         Rheumatoid arthritis (CMS/HCC)    Invasive ductal carcinoma of breast, left (CMS/HCC)            Assessment & Plan:  Hypoxic resp failure likely secondary to fluid overload  --IV lasix   --check stat troponin  --Cards to see  --check ECHO  --restart low dose BB,  she says was stopped on last admission but she's been taking amlodipine  --Last ECHO 7/15 pos for hypertrophic cardiomypathy, impaired relaxation, EF >65%.     CP  --concern for PE, Consult cardiology, Stat Troponin, EKG   --CTA stat chest, D DIMER  --check renal function, desi need CTA rule out PE    COPD  --Nebs, hold steroids for now    Invasive Ductal Carcinoma of Left breast, with widespread bony metastatic Dz, no brain mets on ct 4/18  --Dr. Mendoza following, was supposed to get a plan today to start treatment    Lung Nodule  --s/p Bx with Bronch, negative for mets, no tumor    A FIB per pt and chart  --no AC on admission  --Allg to ASA (GIB)    DM  --check A1C  --low dose ssi and accu checks qac and hs     RA  --no treatment now    GERD  --ppi     Anxiety  --ativan prn     Multiple Allergies     DVT prophylaxis:  lovenox  CODE STATUS:  FULL, I have DW pt   Code Status and Medical Interventions:   Ordered at: 07/11/18 1254     Level Of Support Discussed With:    Patient     Code Status:    CPR     Medical Interventions (Level of Support Prior to Arrest):    Full       Admission Status:  I believe this patient meets INPATIENT status due to the need for care which can only be reasonably provided in an hospital setting such as aggressive/expedited ancillary services and/or consultation services, the necessity for IV medications, close physician monitoring and/or the possible need for procedures.  In such, I feel patient’s risk for adverse outcomes and need for care warrant INPATIENT evaluation and predict the patient’s care encounter to likely last beyond 2 midnights.        Electronically signed by Ania De Leon DO, 07/11/18, 1:00 PM.

## 2018-07-12 ENCOUNTER — APPOINTMENT (OUTPATIENT)
Dept: CARDIOLOGY | Facility: HOSPITAL | Age: 63
End: 2018-07-12
Attending: FAMILY MEDICINE

## 2018-07-12 PROBLEM — J96.11 CHRONIC HYPOXEMIC RESPIRATORY FAILURE (HCC): Status: ACTIVE | Noted: 2018-07-12

## 2018-07-12 PROBLEM — R07.89 ATYPICAL CHEST PAIN: Status: ACTIVE | Noted: 2018-07-12

## 2018-07-12 PROBLEM — R06.00 DYSPNEA: Status: ACTIVE | Noted: 2018-07-12

## 2018-07-12 PROBLEM — I50.33 ACUTE ON CHRONIC DIASTOLIC CHF (CONGESTIVE HEART FAILURE) (HCC): Status: ACTIVE | Noted: 2018-07-12

## 2018-07-12 PROBLEM — K59.00 CONSTIPATION: Status: ACTIVE | Noted: 2018-07-12

## 2018-07-12 LAB
BH CV ECHO MEAS - AO MAX PG (FULL): 5.5 MMHG
BH CV ECHO MEAS - AO MAX PG: 10 MMHG
BH CV ECHO MEAS - AO MEAN PG (FULL): 2.2 MMHG
BH CV ECHO MEAS - AO MEAN PG: 4.7 MMHG
BH CV ECHO MEAS - AO ROOT AREA (BSA CORRECTED): 1.5
BH CV ECHO MEAS - AO ROOT AREA: 6.5 CM^2
BH CV ECHO MEAS - AO ROOT DIAM: 2.9 CM
BH CV ECHO MEAS - AO V2 MAX: 156.4 CM/SEC
BH CV ECHO MEAS - AO V2 MEAN: 98.2 CM/SEC
BH CV ECHO MEAS - AO V2 VTI: 36 CM
BH CV ECHO MEAS - AVA(I,A): 2.1 CM^2
BH CV ECHO MEAS - AVA(I,D): 2.1 CM^2
BH CV ECHO MEAS - AVA(V,A): 1.9 CM^2
BH CV ECHO MEAS - AVA(V,D): 1.9 CM^2
BH CV ECHO MEAS - BSA(HAYCOCK): 1.9 M^2
BH CV ECHO MEAS - BSA: 1.9 M^2
BH CV ECHO MEAS - BZI_BMI: 32.1 KILOGRAMS/M^2
BH CV ECHO MEAS - BZI_METRIC_HEIGHT: 160 CM
BH CV ECHO MEAS - BZI_METRIC_WEIGHT: 82.1 KG
BH CV ECHO MEAS - EDV(CUBED): 102.5 ML
BH CV ECHO MEAS - EDV(TEICH): 101.4 ML
BH CV ECHO MEAS - EF(CUBED): 81.8 %
BH CV ECHO MEAS - EF(TEICH): 74.5 %
BH CV ECHO MEAS - ESV(CUBED): 18.6 ML
BH CV ECHO MEAS - ESV(TEICH): 25.8 ML
BH CV ECHO MEAS - FS: 43.3 %
BH CV ECHO MEAS - IVS/LVPW: 1.1
BH CV ECHO MEAS - IVSD: 1.1 CM
BH CV ECHO MEAS - LA DIMENSION: 3.7 CM
BH CV ECHO MEAS - LA/AO: 1.3
BH CV ECHO MEAS - LV MASS(C)D: 187.4 GRAMS
BH CV ECHO MEAS - LV MASS(C)DI: 101.1 GRAMS/M^2
BH CV ECHO MEAS - LV MAX PG: 4.5 MMHG
BH CV ECHO MEAS - LV MEAN PG: 2.5 MMHG
BH CV ECHO MEAS - LV V1 MAX: 105.6 CM/SEC
BH CV ECHO MEAS - LV V1 MEAN: 74.1 CM/SEC
BH CV ECHO MEAS - LV V1 VTI: 27.7 CM
BH CV ECHO MEAS - LVIDD: 4.7 CM
BH CV ECHO MEAS - LVIDS: 2.7 CM
BH CV ECHO MEAS - LVOT AREA (M): 2.8 CM^2
BH CV ECHO MEAS - LVOT AREA: 2.8 CM^2
BH CV ECHO MEAS - LVOT DIAM: 1.9 CM
BH CV ECHO MEAS - LVPWD: 1.1 CM
BH CV ECHO MEAS - MV A MAX VEL: 88.6 CM/SEC
BH CV ECHO MEAS - MV DEC TIME: 0.19 SEC
BH CV ECHO MEAS - MV E MAX VEL: 99 CM/SEC
BH CV ECHO MEAS - MV E/A: 1.1
BH CV ECHO MEAS - PA ACC SLOPE: 493.6 CM/SEC^2
BH CV ECHO MEAS - PA ACC TIME: 0.15 SEC
BH CV ECHO MEAS - PA MAX PG: 3.9 MMHG
BH CV ECHO MEAS - PA PR(ACCEL): 11.7 MMHG
BH CV ECHO MEAS - PA V2 MAX: 98.8 CM/SEC
BH CV ECHO MEAS - RAP SYSTOLE: 8 MMHG
BH CV ECHO MEAS - RVDD: 1.9 CM
BH CV ECHO MEAS - RVSP: 27 MMHG
BH CV ECHO MEAS - SI(AO): 125.2 ML/M^2
BH CV ECHO MEAS - SI(CUBED): 45.2 ML/M^2
BH CV ECHO MEAS - SI(LVOT): 41.7 ML/M^2
BH CV ECHO MEAS - SI(TEICH): 40.7 ML/M^2
BH CV ECHO MEAS - SV(AO): 232.1 ML
BH CV ECHO MEAS - SV(CUBED): 83.9 ML
BH CV ECHO MEAS - SV(LVOT): 77.2 ML
BH CV ECHO MEAS - SV(TEICH): 75.5 ML
BH CV ECHO MEAS - TAPSE (>1.6): 1.8 CM2
BH CV ECHO MEAS - TR MAX VEL: 209.6 CM/SEC
BH CV ECHO MEAS - TV MAX PG: 2.9 MMHG
BH CV ECHO MEAS - TV V2 MAX: 85.4 CM/SEC
BH CV XLRA - RV BASE: 2.9 CM
BH CV XLRA - RV LENGTH: 6 CM
BH CV XLRA - RV MID: 2.2 CM
GLUCOSE BLDC GLUCOMTR-MCNC: 109 MG/DL (ref 70–130)
GLUCOSE BLDC GLUCOMTR-MCNC: 116 MG/DL (ref 70–130)
GLUCOSE BLDC GLUCOMTR-MCNC: 126 MG/DL (ref 70–130)
GLUCOSE BLDC GLUCOMTR-MCNC: 126 MG/DL (ref 70–130)
LV EF 2D ECHO EST: 65 %

## 2018-07-12 PROCEDURE — 94799 UNLISTED PULMONARY SVC/PX: CPT

## 2018-07-12 PROCEDURE — 99254 IP/OBS CNSLTJ NEW/EST MOD 60: CPT | Performed by: INTERNAL MEDICINE

## 2018-07-12 PROCEDURE — 25010000002 ZOLEDRONIC ACID PER 1 MG: Performed by: INTERNAL MEDICINE

## 2018-07-12 PROCEDURE — 93306 TTE W/DOPPLER COMPLETE: CPT | Performed by: INTERNAL MEDICINE

## 2018-07-12 PROCEDURE — 94760 N-INVAS EAR/PLS OXIMETRY 1: CPT

## 2018-07-12 PROCEDURE — 25010000002 DIPHENHYDRAMINE PER 50 MG: Performed by: INTERNAL MEDICINE

## 2018-07-12 PROCEDURE — 93306 TTE W/DOPPLER COMPLETE: CPT

## 2018-07-12 PROCEDURE — 94640 AIRWAY INHALATION TREATMENT: CPT

## 2018-07-12 PROCEDURE — 25010000002 HEPARIN (PORCINE) PER 1000 UNITS: Performed by: FAMILY MEDICINE

## 2018-07-12 PROCEDURE — 25010000002 FUROSEMIDE PER 20 MG: Performed by: INTERNAL MEDICINE

## 2018-07-12 PROCEDURE — 82962 GLUCOSE BLOOD TEST: CPT

## 2018-07-12 PROCEDURE — 99222 1ST HOSP IP/OBS MODERATE 55: CPT | Performed by: INTERNAL MEDICINE

## 2018-07-12 PROCEDURE — 99233 SBSQ HOSP IP/OBS HIGH 50: CPT | Performed by: INTERNAL MEDICINE

## 2018-07-12 RX ORDER — SENNA AND DOCUSATE SODIUM 50; 8.6 MG/1; MG/1
2 TABLET, FILM COATED ORAL 2 TIMES DAILY
Status: DISCONTINUED | OUTPATIENT
Start: 2018-07-12 | End: 2018-07-24 | Stop reason: HOSPADM

## 2018-07-12 RX ORDER — FUROSEMIDE 10 MG/ML
20 INJECTION INTRAMUSCULAR; INTRAVENOUS ONCE
Status: COMPLETED | OUTPATIENT
Start: 2018-07-12 | End: 2018-07-12

## 2018-07-12 RX ORDER — BISACODYL 10 MG
10 SUPPOSITORY, RECTAL RECTAL 2 TIMES DAILY PRN
Status: DISCONTINUED | OUTPATIENT
Start: 2018-07-12 | End: 2018-07-24 | Stop reason: HOSPADM

## 2018-07-12 RX ORDER — LORAZEPAM 0.5 MG/1
0.5 TABLET ORAL EVERY 6 HOURS PRN
Status: DISCONTINUED | OUTPATIENT
Start: 2018-07-12 | End: 2018-07-24 | Stop reason: HOSPADM

## 2018-07-12 RX ORDER — TRIAMCINOLONE ACETONIDE 1 MG/G
CREAM TOPICAL EVERY 12 HOURS SCHEDULED
Status: DISCONTINUED | OUTPATIENT
Start: 2018-07-12 | End: 2018-07-24 | Stop reason: HOSPADM

## 2018-07-12 RX ORDER — PANTOPRAZOLE SODIUM 40 MG/1
40 TABLET, DELAYED RELEASE ORAL
Status: DISCONTINUED | OUTPATIENT
Start: 2018-07-12 | End: 2018-07-12

## 2018-07-12 RX ADMIN — HEPARIN SODIUM 5000 UNITS: 5000 INJECTION, SOLUTION INTRAVENOUS; SUBCUTANEOUS at 05:36

## 2018-07-12 RX ADMIN — DIPHENHYDRAMINE HYDROCHLORIDE 25 MG: 50 INJECTION INTRAMUSCULAR; INTRAVENOUS at 10:58

## 2018-07-12 RX ADMIN — PANTOPRAZOLE SODIUM 40 MG: 40 TABLET, DELAYED RELEASE ORAL at 17:24

## 2018-07-12 RX ADMIN — IPRATROPIUM BROMIDE AND ALBUTEROL SULFATE 3 ML: 2.5; .5 SOLUTION RESPIRATORY (INHALATION) at 16:16

## 2018-07-12 RX ADMIN — IPRATROPIUM BROMIDE AND ALBUTEROL SULFATE 3 ML: 2.5; .5 SOLUTION RESPIRATORY (INHALATION) at 19:51

## 2018-07-12 RX ADMIN — TRIAMCINOLONE ACETONIDE: 1 CREAM TOPICAL at 17:25

## 2018-07-12 RX ADMIN — CARVEDILOL 3.12 MG: 3.12 TABLET, FILM COATED ORAL at 08:33

## 2018-07-12 RX ADMIN — HEPARIN SODIUM 5000 UNITS: 5000 INJECTION, SOLUTION INTRAVENOUS; SUBCUTANEOUS at 14:02

## 2018-07-12 RX ADMIN — IPRATROPIUM BROMIDE AND ALBUTEROL SULFATE 3 ML: 2.5; .5 SOLUTION RESPIRATORY (INHALATION) at 12:10

## 2018-07-12 RX ADMIN — IPRATROPIUM BROMIDE AND ALBUTEROL SULFATE 3 ML: 2.5; .5 SOLUTION RESPIRATORY (INHALATION) at 23:38

## 2018-07-12 RX ADMIN — IPRATROPIUM BROMIDE AND ALBUTEROL SULFATE 3 ML: 2.5; .5 SOLUTION RESPIRATORY (INHALATION) at 02:41

## 2018-07-12 RX ADMIN — FUROSEMIDE 20 MG: 10 INJECTION, SOLUTION INTRAMUSCULAR; INTRAVENOUS at 13:00

## 2018-07-12 RX ADMIN — IPRATROPIUM BROMIDE AND ALBUTEROL SULFATE 3 ML: 2.5; .5 SOLUTION RESPIRATORY (INHALATION) at 07:14

## 2018-07-12 RX ADMIN — CARVEDILOL 3.12 MG: 3.12 TABLET, FILM COATED ORAL at 17:24

## 2018-07-12 RX ADMIN — LETROZOLE 2.5 MG: 2.5 TABLET, FILM COATED ORAL at 08:33

## 2018-07-12 RX ADMIN — ZOLEDRONIC ACID 4 MG: 4 INJECTION, SOLUTION, CONCENTRATE INTRAVENOUS at 08:34

## 2018-07-12 RX ADMIN — PANTOPRAZOLE SODIUM 40 MG: 40 TABLET, DELAYED RELEASE ORAL at 05:36

## 2018-07-12 RX ADMIN — SENNOSIDES AND DOCUSATE SODIUM 2 TABLET: 8.6; 5 TABLET ORAL at 17:24

## 2018-07-12 NOTE — PAYOR COMM NOTE
"Kera Chi (62 y.o. Female)   Id # [id number]  Nguyen Singleton RN, BSN  Phone # [phone]  Fax # [phone]    Date of Birth Social Security Number Address Home Phone MRN    1955  [address] [id number] [phone] [id number]    [employer] Marital Status          [employer]        Admission Date Admission Type Admitting Provider Attending Provider Department, Room/Bed    18 Elective Blade Taylor MD West, Christopher R, MD [facility] 6B, N625/1    Discharge Date Discharge Disposition Discharge Destination                       Attending Provider:  Blade Taylor MD    Allergies:  Ampicillin, Erythromycin, Morphine And Related, Oxycodone-acetaminophen, Oxycodone-aspirin, Penicillins, Sulfa Antibiotics, Aspirin, Codeine, Contrast Dye, Ibuprofen, Latex, Propoxyphene, Saccharin, Tramadol    Isolation:  None   Infection:  None   Code Status:  CPR    Ht:  160 cm (62.99\")   Wt:  82.1 kg (181 lb)    Admission Cmt:  None   Principal Problem:  None                Active Insurance as of 2018     Primary Coverage     Payor Plan Insurance Group Employer/Plan Group    PASSPORT PASSPORT MEDICAID     Payor Plan Address Payor Plan Phone Number Effective From Effective To    [address] [phone] 1997     [facility] [id number]       Subscriber Name Subscriber Birth Date Member ID       KERA CHI 1955 [id number]                 Emergency Contacts      (Rel.) Home Phone Work Phone Mobile Phone    Fely Stringer (Relative) [phone] -- --    Camacho Stringer (Son) -- -- [phone]    Niya Stringer (Grandchild) [phone] -- --               History & Physical      Ania De Leon DO at 2018 12:59 PM              [facility] Medicine Services  HISTORY AND PHYSICAL    Patient Name: Kera Chi  : 1955  MRN: [id number]  Primary Care Physician: Freddy Maldonado, " "APRN    Subjective   Subjective     Chief Complaint:  soa and swelling     HPI:  Betsy Chi is a 62 y.o. female with a history of hypertension, dyslipidemia, diabetes, atrial fibrillation, COPD and left breast cancer  Admitted from Dr.Amy Mendoza's office today due to increased sob and work of breathing. Per pt and friend at bedsidel she has had increased cp as well as edema worsening since she was in the hospital in may 2018.  She states that her legs feel very tight and she becomes very sob when she lays flat.  She denies wheeze but states she has been coughing up clear phlegm.  She denies fever or chills. Admits to intermittent chest pain feels like stabbing through to her back. Non radiating and gets better. As well as nausea and vomiting.  Denies syncope.  She arrives to the room today anxious and tearful.  Says she just got her event monitor off last week but hasn't heard and states that she didn't do it right because she didn't understand directions.  She is not on any treatment yet for her breast cancer nor is she on any pain meds at home. She does have abd pain but she attributes it to her \"gallbladder\".     Review of Systems   Gen- No fevers, chills  CV- pos  chest pain, pos palpitations  Resp- pos cough, pos dyspnea  GI- No N/V/D,pos  abd pain    Otherwise 10-system ROS reviewed and is negative except as mentioned in the HPI.    Personal History     Past Medical History:   Diagnosis Date   • Arthritis    • Asthma    • Atrial fibrillation (CMS/HCC)    • Breast cancer (CMS/HCC)    • CHF (congestive heart failure) (CMS/HCC)    • Chronic kidney disease (CKD)    • COPD (chronic obstructive pulmonary disease) (CMS/HCC)    • CVA (cerebral vascular accident) (CMS/HCC)    • Diabetes mellitus, type 2 (CMS/HCC)    • GERD (gastroesophageal reflux disease)    • Hypercholesteremia    • Hypertension    • Myocardial infarction    • Osteoarthritis    • Psoriatic arthritis (CMS/HCC)    • Pulmonary embolism (CMS/HCC)  "   • Rheumatoid arthritis (CMS/HCC)    • Sciatica    • Smoker    • UTI (urinary tract infection)        Past Surgical History:   Procedure Laterality Date   • BLADDER SURGERY     • BREAST BIOPSY     • BREAST LUMPECTOMY     • BRONCHOSCOPY N/A 5/9/2018    Procedure: BRONCHOSCOPY WITH ENDOBRONCHIAL ULTRASOUND AND NAVIGATION WITH FLUORO;  Surgeon: Nixon Mulligan MD;  Location: [facility] ENDOSCOPY;  Service: Pulmonary   • HYSTERECTOMY     • OOPHORECTOMY     • TUMOR REMOVAL      fallopian tube   • US GUIDED FINE NEEDLE ASPIRATION  5/3/2018       Family History: family history includes Aneurysm in her father and mother; Breast cancer in her cousin; Cancer in her father and mother.     Social History:  reports that she has been smoking Cigarettes.  She has a 11.50 pack-year smoking history. She has never used smokeless tobacco. She reports that she does not drink alcohol or use drugs.  Social History     Social History Narrative    The patient lives with her niece in [place].    Caffeine: sometimes a pot of coffee and sometimes none.           Medications:  Prescriptions Prior to Admission   Medication Sig Dispense Refill Last Dose   • albuterol (PROVENTIL HFA;VENTOLIN HFA) 108 (90 Base) MCG/ACT inhaler Inhale 2 puffs Every 4 (Four) Hours As Needed for Wheezing. 1 inhaler 2 Taking   • aluminum acetate (DOMEBORO) pack packet Apply  topically 3 (Three) Times a Day As Needed (psoriatic arthritis). 100 each 6 Taking   • amLODIPine (NORVASC) 10 MG tablet TAKE ONE TABLET BY MOUTH DAILY 30 tablet 0    • atorvastatin (LIPITOR) 20 MG tablet Take 1 tablet by mouth Every Night. 90 tablet 0 Taking   • carvedilol (COREG) 12.5 MG tablet TAKE ONE TABLET BY MOUTH TWICE A DAY WITH MEALS 60 tablet 0    • Cranberry 600 MG tablet Take 1 tablet by mouth Daily.   Taking   • diphenhydrAMINE (BENADRYL) 25 MG tablet Take 25 mg by mouth 2 (Two) Times a Day As Needed.   Taking   • glucose blood test strip Use as instructed 100 each 12 Taking    • glucose monitor monitoring kit 1 each 3 (Three) Times a Day As Needed (hypo/hyperglycemia). 1 each 3 Taking   • Lancets (ACCU-CHEK SOFT TOUCH) lancets Three times daily and as needed 100 each 12    • letrozole (FEMARA) 2.5 MG tablet Take 1 tablet by mouth Daily. 30 tablet 11    • ondansetron (ZOFRAN) 8 MG tablet Take 1 tablet by mouth 3 (Three) Times a Day As Needed for Nausea or Vomiting. 30 tablet 5    • pantoprazole (PROTONIX) 40 MG EC tablet Take 1 tablet by mouth Daily. 90 tablet 2        Allergies   Allergen Reactions   • Ampicillin Anaphylaxis   • Erythromycin Anaphylaxis   • Morphine And Related Anaphylaxis   • Oxycodone-Acetaminophen Anaphylaxis   • Oxycodone-Aspirin Anaphylaxis   • Penicillins Anaphylaxis   • Sulfa Antibiotics Swelling and Rash   • Aspirin GI Bleeding   • Codeine Swelling   • Contrast Dye Swelling   • Ibuprofen GI Bleeding   • Latex Arrhythmia   • Propoxyphene Nausea And Vomiting   • Saccharin Nausea And Vomiting   • Tramadol Swelling       Objective   Objective     Vital Signs:   Temp:  [98.5 °F (36.9 °C)] 98.5 °F (36.9 °C)  Heart Rate:  [] 100  Resp:  [24-36] 24  BP: (113-120)/(68-78) 120/78        Physical Exam   Constitutional: sitting up in chair, anxious, tearful, awake, alert  Eyes: PERRLA, sclerae anicteric, no conjunctival injection  HENT: NCAT, mucous membranes moist, poor dentition   Neck: Supple, no thyromegaly, no lymphadenopathy, trachea midline  Respiratory: Clear to auscultation bilaterally, tachypnea, no wheeze, dimished at bases   Cardiovascular: RRR, no murmurs, rubs, or gallops, palpable pedal pulses bilaterally  Gastrointestinal: Positive bowel sounds, soft, nontender, nondistended  Musculoskeletal: +2 bilateral ankle edema, mild clubbing or cyanosis to extremities  Psychiatric: Anxious, cooperative  Neurologic: Oriented x 3, strength symmetric in all extremities, Cranial Nerves grossly intact to confrontation, speech clear  Skin: No rashes      Results  Reviewed:  I have personally reviewed current lab, radiology, and data and agree.      Results from last 7 days  Lab Units 07/11/18  1308   WBC 10*3/mm3 7.35   HEMOGLOBIN g/dL 14.1   HEMATOCRIT % 42.7   PLATELETS 10*3/mm3 230           Invalid input(s):  ALKPHOS, TROPONININT  CrCl cannot be calculated (Patient's most recent lab result is older than the maximum 30 days allowed.).  Brief Urine Lab Results  (Last result in the past 365 days)      Color   Clarity   Blood   Leuk Est   Nitrite   Protein   CREAT   Urine HCG        05/07/18 1101 Yellow Hazy(A) Negative 25 Vika/ul(A) Negative Negative             No results found for: BNP  Imaging Results (last 24 hours)     Procedure Component Value Units Date/Time    XR Chest 1 View [[id number]] Updated:  07/11/18 1329        Results for orders placed during the hospital encounter of 02/05/16   SCANNED - ECHOCARDIOGRAM       Assessment/Plan   Assessment / Plan     Hospital Problem List     Acute on chronic hypoxemic respiratory failure not requiring ventilatory support    Mixed anxiety depressive disorder    Atrial fibrillation (CMS/HCC)    Hepatic cirrhosis (CMS/HCC)    Type 2 diabetes mellitus (CMS/HCC)    Dyslipidemia    Hypertension    Pulmonary nodule right lower lobe medially (2 x 1.5 cm).    Overview Signed 5/22/2017  1:30 PM by Janine Mckeon PA-C     Description: A.  CT scan of the chest February 11 thousand 16 reports a noncalcified 8.5-9 mm nodule in the medial portion of the right lower lobe.         Rheumatoid arthritis (CMS/HCC)    Invasive ductal carcinoma of breast, left (CMS/HCC)            Assessment & Plan:  Hypoxic resp failure likely secondary to fluid overload  --IV lasix   --check stat troponin  --Cards to see  --check ECHO  --restart low dose BB, she says was stopped on last admission but she's been taking amlodipine  --Last ECHO 7/15 pos for hypertrophic cardiomypathy, impaired relaxation, EF >65%.     CP  --concern for PE, Consult cardiology, Stat  Troponin, EKG   --CTA stat chest, D DIMER  --check renal function, desi need CTA rule out PE    COPD  --Nebs, hold steroids for now    Invasive Ductal Carcinoma of Left breast, with widespread bony metastatic Dz, no brain mets on ct 4/18  --Dr. Mendoza following, was supposed to get a plan today to start treatment    Lung Nodule  --s/p Bx with Bronch, negative for mets, no tumor    A FIB per pt and chart  --no AC on admission  --Allg to ASA (GIB)    DM  --check A1C  --low dose ssi and accu checks qac and hs     RA  --no treatment now    GERD  --ppi     Anxiety  --ativan prn     Multiple Allergies     DVT prophylaxis:  lovenox  CODE STATUS:  FULL, I have DW pt   Code Status and Medical Interventions:   Ordered at: 07/11/18 1254     Level Of Support Discussed With:    Patient     Code Status:    CPR     Medical Interventions (Level of Support Prior to Arrest):    Full       Admission Status:  I believe this patient meets INPATIENT status due to the need for care which can only be reasonably provided in an hospital setting such as aggressive/expedited ancillary services and/or consultation services, the necessity for IV medications, close physician monitoring and/or the possible need for procedures.  In such, I feel patient’s risk for adverse outcomes and need for care warrant INPATIENT evaluation and predict the patient’s care encounter to likely last beyond 2 midnights.        Electronically signed by Ania De Leon DO, 07/11/18, 1:00 PM.            Electronically signed by Ania De Leon DO at 7/11/2018  2:06 PM       Josette Lares MD Physician Signed Medicine  Significant Event Date of Service: 7/11/2018  4:44 PM         []Manual[]Template  []Copied  Patient's prior contrast reaction is questionable.  Apparently she tolerated the IV contrast with her last CTA in April with only some nonspecific symptoms in the days-weeks following.  Will use option 3 premedication with 50mg IV benadryl prior to CT angiogram  and forego steroids given high risk for PE requiring timely diagnosis.               Consult Notes (last 72 hours) (Notes from 7/9/2018  1:49 PM through 7/12/2018  1:49 PM)      Nadir Ojeda DO at 7/12/2018 12:45 PM      Consult Orders:    1. Inpatient Palliative Care MD Consult [[id number]] ordered by Mary Mendoza MD at 07/11/18 1518                PRAFUL Wheatley  Consulting physician: Reji    No chief complaint on file.        HPI: Patient is a 62-year-old female with a history of metastatic breast cancer.  Patient came in hospital due to dyspnea.  Patient states that since getting a dose of Lasix she feels that her breathing has improved significantly.  States that she thinks that another dose of Lasix will continue to help improve her symptoms, nursing staff does state they're going to give her a dose as soon as we get IV access.    I did also talk to the patient about any type of pain that she has.  Patient does admit to some sporadic pain but nothing that is consistent.  Her main symptoms appear to be related to some dysphagia that she is having since she had a stroke.      Past Medical History:   Diagnosis Date   • Arthritis    • Asthma    • Atrial fibrillation (CMS/HCC)    • Breast cancer (CMS/HCC)    • CHF (congestive heart failure) (CMS/HCC)    • Chronic kidney disease (CKD)    • COPD (chronic obstructive pulmonary disease) (CMS/HCC)    • CVA (cerebral vascular accident) (CMS/HCC)    • Diabetes mellitus, type 2 (CMS/HCC)    • GERD (gastroesophageal reflux disease)    • Hypercholesteremia    • Hypertension    • Myocardial infarction    • Osteoarthritis    • Psoriatic arthritis (CMS/HCC)    • Pulmonary embolism (CMS/HCC)    • Rheumatoid arthritis (CMS/HCC)    • Sciatica    • Smoker    • UTI (urinary tract infection)      Past Surgical History:   Procedure Laterality Date   • BLADDER SURGERY     • BREAST BIOPSY     • BREAST LUMPECTOMY     • BRONCHOSCOPY N/A 5/9/2018    Procedure: BRONCHOSCOPY WITH  ENDOBRONCHIAL ULTRASOUND AND NAVIGATION WITH FLUORO;  Surgeon: Nixon Mulligan MD;  [facility];  Service: Pulmonary   • HYSTERECTOMY     • OOPHORECTOMY     • TUMOR REMOVAL      fallopian tube   • US GUIDED FINE NEEDLE ASPIRATION  5/3/2018     Current Code Status     Date Active Code Status Order ID Comments User Context       7/11/2018 12:54 PM CPR [id number]  Ania L Atkins, DO Inpatient       Questions for Current Code Status     Question Answer Comment    Code Status CPR     Medical Interventions (Level of Support Prior to Arrest) Full     Level Of Support Discussed With Patient         Current Facility-Administered Medications   Medication Dose Route Frequency Provider Last Rate Last Dose   • atorvastatin (LIPITOR) tablet 20 mg  20 mg Oral Nightly Ania L Atkins, DO   20 mg at 07/11/18 2150   • carvedilol (COREG) tablet 3.125 mg  3.125 mg Oral BID With Meals Ania L Atkins, DO   3.125 mg at 07/12/18 0833   • dextrose (D50W) solution 25 g  25 g Intravenous Q15 Min PRN Ania L Atkins, DO       • dextrose (GLUTOSE) oral gel 15 g  15 g Oral Q15 Min PRN Ania L Atkins, DO       • diphenhydrAMINE (BENADRYL) injection 25 mg  25 mg Intravenous Q6H PRN Josette Lares MD   25 mg at 07/12/18 1058    Or   • diphenhydrAMINE (BENADRYL) injection 25 mg  25 mg Intramuscular Q6H PRN Josette Lares MD       • furosemide (LASIX) injection 20 mg  20 mg Intravenous Once Zohaib Davis MD       • glucagon (human recombinant) (GLUCAGEN DIAGNOSTIC) injection 1 mg  1 mg Subcutaneous PRN Ania L Atkins, DO       • heparin (porcine) 5000 UNIT/ML injection 5,000 Units  5,000 Units Subcutaneous Q8H Ania L Atkins, DO   5,000 Units at 07/12/18 0536   • insulin lispro (humaLOG) injection 0-7 Units  0-7 Units Subcutaneous 4x Daily With Meals & Nightly Ania L Atkins, DO       • ipratropium-albuterol (DUO-NEB) nebulizer solution 3 mL  3 mL Nebulization Q4H - RT Ania L Atkins, DO   3 mL at 07/12/18 1210   •  letrozole (FEMARA) tablet 2.5 mg  2.5 mg Oral Daily Ania L Atkins, DO   2.5 mg at 07/12/18 0833   • ondansetron (ZOFRAN) injection 4 mg  4 mg Intravenous Q6H PRN Ania L Atkins, DO       • pantoprazole (PROTONIX) EC tablet 40 mg  40 mg Oral Q AM Ania L Atkins, DO   40 mg at 07/12/18 0536   • Pharmacy Consult - MTM   Does not apply Daily Evelyne Rai RPH       • sodium chloride 0.9 % flush 1-10 mL  1-10 mL Intravenous PRN Ania L Atkins, DO            dextrose  •  dextrose  •  diphenhydrAMINE **OR** diphenhydrAMINE  •  glucagon (human recombinant)  •  ondansetron  •  sodium chloride  Allergies   Allergen Reactions   • Ampicillin Anaphylaxis   • Erythromycin Anaphylaxis   • Morphine And Related Anaphylaxis   • Oxycodone-Acetaminophen Anaphylaxis   • Oxycodone-Aspirin Anaphylaxis   • Penicillins Anaphylaxis   • Sulfa Antibiotics Swelling and Rash   • Aspirin GI Bleeding   • Codeine Swelling   • Contrast Dye Swelling   • Ibuprofen GI Bleeding   • Latex Arrhythmia   • Propoxyphene Nausea And Vomiting   • Saccharin Nausea And Vomiting   • Tramadol Swelling     Family History   Problem Relation Age of Onset   • Breast cancer Cousin         Maternal cousins x 3 with breast cancer   • Cancer Mother         Uterine, colon   • Aneurysm Mother    • Cancer Father         Thyroid   • Aneurysm Father      Social History     Social History   • Marital status: Legally      Spouse name: N/A   • Number of children: 3   • Years of education: N/A     Occupational History   • caretaker      retired     Social History Main Topics   • Smoking status: Light Tobacco Smoker     Packs/day: 0.25     Years: 46.00     Types: Cigarettes   • Smokeless tobacco: Never Used      Comment: has attempted to quit this week   • Alcohol use No   • Drug use: No   • Sexual activity: Defer     Other Topics Concern   • Not on file     Social History Narrative    The patient lives with her niece in [place].    Caffeine: sometimes a  "pot of coffee and sometimes none.         Review of Systems - All others reviewed and found negative except as mentioned in the history of present illness      BP 95/62   Pulse 82   Temp 97.9 °F (36.6 °C) (Oral)   Resp 16   Ht 160 cm (62.99\")   Wt 82.1 kg (181 lb)   SpO2 91%   BMI 32.07 kg/m²      Intake/Output Summary (Last 24 hours) at 07/12/18 1245  Last data filed at 07/12/18 0900   Gross per 24 hour   Intake              360 ml   Output             1000 ml   Net             -640 ml     Physical Exam:      General Appearance:    Alert, cooperative, in no acute distress   Head:    Normocephalic, without obvious abnormality, atraumatic   Eyes:            Lids and lashes normal, conjunctivae and sclerae normal, no   icterus, no pallor, corneas clear, PERRLA   Ears:    Ears appear intact with no abnormalities noted   Throat:   No oral lesions, no thrush, oral mucosa moist   Neck:   No adenopathy, supple, trachea midline, no thyromegaly, no     carotid bruit, no JVD   Back:     No kyphosis present, no scoliosis present, no skin lesions,       erythema or scars, no tenderness to percussion or                   palpation,   range of motion normal   Lungs:     Clear to auscultation,respirations regular, even and                   unlabored    Heart:    Regular rhythm and normal rate, normal S1 and S2, no            murmur, no gallop, no rub, no click   Breast Exam:    Deferred   Abdomen:     Normal bowel sounds, no masses, no organomegaly, soft        non-tender, non-distended, no guarding, no rebound                 tenderness   Genitalia:    Deferred   Extremities:   Moves all extremities well, no edema, no cyanosis, no              redness   Pulses:   Pulses palpable and equal bilaterally   Skin:   No bleeding, bruising or rash   Lymph nodes:   No palpable adenopathy   Neurologic:   Cranial nerves 2 - 12 grossly intact, sensation intact, DTR        present and equal bilaterally         Results from last 7 " days  Lab Units 07/11/18  1308   WBC 10*3/mm3 7.35   HEMOGLOBIN g/dL 14.1   HEMATOCRIT % 42.7   PLATELETS 10*3/mm3 230       Results from last 7 days  Lab Units 07/11/18  1308   SODIUM mmol/L 142   POTASSIUM mmol/L 4.6   CHLORIDE mmol/L 107   CO2 mmol/L 27.0   BUN mg/dL 15   CREATININE mg/dL 1.07   CALCIUM mg/dL 9.5   BILIRUBIN mg/dL 0.8   ALK PHOS U/L 138*   ALT (SGPT) U/L 28   AST (SGOT) U/L 24   GLUCOSE mg/dL 102*       Results from last 7 days  Lab Units 07/11/18  1308   SODIUM mmol/L 142   POTASSIUM mmol/L 4.6   CHLORIDE mmol/L 107   CO2 mmol/L 27.0   BUN mg/dL 15   CREATININE mg/dL 1.07   GLUCOSE mg/dL 102*   CALCIUM mg/dL 9.5     Imaging Results (last 72 hours)     Procedure Component Value Units Date/Time    CT Angiogram Chest With & Without Contrast [[id number]] Collected:  07/11/18 2026     Updated:  07/12/18 0840    Narrative:       EXAMINATION: CT ANGIOGRAM CHEST W/WO CONTRAST - 7/11/2018     INDICATION: Chest pain, shortness of air.     TECHNIQUE: Multiple axial CT imaging is obtained of the chest following  the administration of intravenous contrast according to the CT angiogram  protocol. 2D coronal reformatted images were submitted to further  facilitate diagnostic accuracy and treatment planning.      The radiation dose reduction device was turned on for each scan per the  ALARA (As Low as Reasonably Achievable) protocol.     COMPARISON: 4/23/2018.     FINDINGS: There is a pulmonary nodule seen within the right lower lobe  which is stable in size and appearance when compared to the prior study.  There is adenopathy identified within the right hilar region as well as  throughout the mediastinum which is stable and unchanged. The remainder  of the lung parenchyma reveals mild chronic and emphysematous change.  There is some calcification seen in the mediastinal and left hilar lymph  nodes suggesting old healed granulomatous disease. There are atelectatic  changes identified at the lung bases  bilaterally. Cardiac chambers are  within normal limits. There is no filling defect in the pulmonary  arteries to suggest evidence of pulmonary embolism. The lymph nodes in  the left axillary region are stable. No mediastinal mass. Vascular  calcification is seen within the thoracic aorta. Degenerative change is  seen within the spine. There are areas of sclerosis within the spine  concerning for bony metastatic disease. The visualized upper abdomen is  grossly unremarkable.       Impression:       Stable appearance of the pulmonary nodule as well as stable  hilar adenopathy on the right and mediastinal adenopathy. There is  stable bony metastatic disease throughout the bony skeleton. There is  also stable appearance of the left axillary region. No new focal  parenchymal opacification. No evidence of pulmonary embolism.      DICTATED:   711/2018  EDITED/ls :   7/11/2018      This report was finalized on 7/12/2018 8:38 AM by Dr. Valentine Dover MD.       XR Chest 1 View [[id number]] Collected:  07/11/18 1425     Updated:  07/11/18 1455    Narrative:       EXAMINATION: XR CHEST 1 VW-07/11/2018:      INDICATION: SOA.      COMPARISON: 04/24/2018.     FINDINGS: Portable chest reveals cardiac and mediastinal silhouettes to  be within normal limits. Underlying chronic and emphysematous changes  seen within the lung fields bilaterally. Calcified granuloma identified  at the left lung base.  No pleural effusion  or pneumothorax. Pulmonary  vascularity is within normal limits.           Impression:       No acute cardiopulmonary disease. Stable chronic changes  seen within the lung fields bilaterally.     D:  07/11/2018  E:  07/11/2018     This report was finalized on 7/11/2018 2:53 PM by Dr. Valentine Dover MD.           Impression: Breast cancer with bone metastasis  Dysphagia  Dyspnea  Goals of care  Plan: Patient states that her overall goals is to probably talk to oncology about continued to pursue chemotherapy,  which is cooperated by oncology notes.    Apparently the patient has multiple allergies to medications especially opioids as well as having some contraindications to using adjuvant therapy such as ibuprofen.  With this in mind I want to follow alone and not make any changes for the time being.      Nadir Ojeda DO  07/12/18  12:45 PM              Electronically signed by Nadir Ojeda DO at 7/12/2018 12:50 PM     Zohaib Davis MD at 7/12/2018  9:21 AM           [facility]   [address] [place] [address], [address]  [place], [place], [address] [phone]  WWW[facility]           INPATIENT CONSULTATION NOTE    Referring Physician: MD Sheng Rosario Md  [address] [place] [id number]    Patient Care Team:  Patient Care Team:  PRAFUL Wheatley as PCP - General (Nurse Practitioner)    Reason for consultation: Chest Pain      Subjective     HPI:    Betsy Chi is a 62 y.o. female.  History of Present Illness    The patient has a history of atrial fibrillation, diastolic heart failure, CVA, COPD, DVT, Breast cancer, diabetes mellitus, rheumatoid arthritis, psoriatic arthritis, osteoarthritis, hypertension, hyperlipidemia, and cirrhosis.    The patient presented to [facility] after being seen yesterday by Dr. Mary Mendoza.  She was tachypneic on exam and was believed to need diuresis so the hospitalist service was contacted and she was admitted.  The patient reports that she has had worsening shortness of breath since May.  She also reports intermittent episodes of chest pain.  These episodes occur with or without exertion, are brief, and described as alternating between stabbing and shocking. The pain occurs under her left breast and epigastric area and radiates to the back.  She reports improvement in her symptoms since receiving IV Lasix yesterday.  She denies any current chest pain. She does report that her generalized pain is uncontrolled.   She does have intermittent episodes of dizziness.    She has formerly seen Dr. Jones in the past.  She is a current smoker, reports 5 cigarettes per day that she does not inhale.  She has a 40+ year history.  She reports rare alcohol consumption.  She denies any drug abuse.     PFSH:  Patient Active Problem List   Diagnosis   • Mixed anxiety depressive disorder   • Asthma   • Coronary arteriosclerosis in native artery   • Atrial fibrillation (CMS/HCC)   • Biliary dyskinesia   • History of Cerebral infarction (CT scan of brain this hospitalization normal)   • Chronic bronchitis (CMS/HCC)   • Chronic low back pain   • Exacerbation of COPD/acute bronchitis in an active smoker   • Chronic urinary tract infection   • Hepatic cirrhosis (CMS/HCC)   • Type 2 diabetes mellitus (CMS/HCC)   • Dyslipidemia   • Fibromyalgia   • Gastroesophageal reflux disease without esophagitis   • Hypertension   • Disorder of kidney   • Disorder of endocrine system   • Osteoarthritis   • Osteoporosis   • Pulmonary nodule right lower lobe medially (2 x 1.5 cm).   • Rheumatoid arthritis (CMS/HCC)   • Seasonal allergic rhinitis   • Biliary sludge   • Peptic ulcer of stomach   • Acute on chronic hypoxemic respiratory failure not requiring ventilatory support   • Invasive ductal carcinoma of breast, left (CMS/HCC)   • Bone metastases (CMS/HCC)   • Open angle with borderline findings and low glaucoma risk in both eyes   • Palpitations       No current facility-administered medications on file prior to encounter.      Current Outpatient Prescriptions on File Prior to Encounter   Medication Sig Dispense Refill   • albuterol (PROVENTIL HFA;VENTOLIN HFA) 108 (90 Base) MCG/ACT inhaler Inhale 2 puffs Every 4 (Four) Hours As Needed for Wheezing. 1 inhaler 2   • aluminum acetate (DOMEBORO) pack packet Apply  topically 3 (Three) Times a Day As Needed (psoriatic arthritis). 100 each 6   • amLODIPine (NORVASC) 10 MG tablet TAKE ONE TABLET BY MOUTH DAILY 30  tablet 0   • atorvastatin (LIPITOR) 20 MG tablet Take 1 tablet by mouth Every Night. 90 tablet 0   • carvedilol (COREG) 12.5 MG tablet TAKE ONE TABLET BY MOUTH TWICE A DAY WITH MEALS 60 tablet 0   • Cranberry 600 MG tablet Take 1 tablet by mouth Daily.     • glucose blood test strip Use as instructed 100 each 12   • glucose monitor monitoring kit 1 each 3 (Three) Times a Day As Needed (hypo/hyperglycemia). 1 each 3   • Lancets (ACCU-CHEK SOFT TOUCH) lancets Three times daily and as needed 100 each 12   • letrozole (FEMARA) 2.5 MG tablet Take 1 tablet by mouth Daily. 30 tablet 11   • ondansetron (ZOFRAN) 8 MG tablet Take 1 tablet by mouth 3 (Three) Times a Day As Needed for Nausea or Vomiting. 30 tablet 5   • pantoprazole (PROTONIX) 40 MG EC tablet Take 1 tablet by mouth Daily. 90 tablet 2     Allergies   Allergen Reactions   • Ampicillin Anaphylaxis   • Erythromycin Anaphylaxis   • Morphine And Related Anaphylaxis   • Oxycodone-Acetaminophen Anaphylaxis   • Oxycodone-Aspirin Anaphylaxis   • Penicillins Anaphylaxis   • Sulfa Antibiotics Swelling and Rash   • Aspirin GI Bleeding   • Codeine Swelling   • Contrast Dye Swelling   • Ibuprofen GI Bleeding   • Latex Arrhythmia   • Propoxyphene Nausea And Vomiting   • Saccharin Nausea And Vomiting   • Tramadol Swelling       Social History     Social History   • Marital status: Legally    • Number of children: 3     Occupational History   • caretaker      retired     Social History Main Topics   • Smoking status: Light Tobacco Smoker     Packs/day: 0.25     Years: 46.00     Types: Cigarettes   • Smokeless tobacco: Never Used      Comment: has attempted to quit this week   • Alcohol use No   • Drug use: No   • Sexual activity: Defer     Other Topics Concern   • Not on file     Social History Narrative    The patient lives with her niece in [place].    Caffeine: sometimes a pot of coffee and sometimes none.         Family History   Problem Relation Age of Onset    • Breast cancer Cousin         Maternal cousins x 3 with breast cancer   • Cancer Mother         Uterine, colon   • Aneurysm Mother    • Cancer Father         Thyroid   • Aneurysm Father        Review of Systems:  Positive for dyspnea with exertion, chest pain, lower extremity edema, orthopnea, dizziness  Negative for syncope.   All other systems reviewed are negative.    Objective     Objective:     Vital Sign Min/Max for last 24 hours  Temp  Min: 97.3 °F (36.3 °C)  Max: 98.6 °F (37 °C)   BP  Min: 101/54  Max: 120/78   Pulse  Min: 62  Max: 100   Resp  Min: 15  Max: 36   SpO2  Min: 92 %  Max: 97 %   No Data Recorded      Intake/Output Summary (Last 24 hours) at 07/12/18 0921  Last data filed at 07/12/18 0200   Gross per 24 hour   Intake              120 ml   Output             1000 ml   Net             -880 ml           Vitals:    07/12/18 0833   BP: 104/55   Pulse: 78   Resp:    Temp:    SpO2:        CONSTITUTIONAL: Well-nourished. In no acute distress.   SKIN: Warm and dry. No rashes noted   HEENT: Head is normocephalic and atraumatic. Mucous membranes are pink and moist.   NECK: Supple without masses or thyromegaly. There is no jugular venous distention at 30°.  LUNGS: Normal effort. Clear to auscultation bilaterally without wheezing, rhonchi, or rales noted.   CARDIOVASCULAR: The heart has a regular rate with a normal S1 and S2. There is no murmur, gallop, rub, or click appreciated.   PERIPHERAL VASCULAR: Carotid upstroke is 2+ bilaterally and without bruits. Radial pulses are 2+ bilaterally. Posterior tibial pulses are 2+ and symmetrical. There is mild lower extremity edema.   ABDOMEN: Soft with no tenderness with palpitation. No hepatosplenomegaly  MUSCULOSKELETAL:  No digital cyanosis  NEUROLOGICAL: Nonfocal.  PSYCHIATRIC: Alert, orientated x 3, tearful.     Labs:  Lab Results   Component Value Date    TROPONINI <0.006 07/11/2018       Glucose   Date Value Ref Range Status   07/11/2018 102 (H) 70 - 100  mg/dL Final     BUN   Date Value Ref Range Status   07/11/2018 15 9 - 23 mg/dL Final     Creatinine   Date Value Ref Range Status   07/11/2018 1.07 0.60 - 1.30 mg/dL Final     Sodium   Date Value Ref Range Status   07/11/2018 142 132 - 146 mmol/L Final     Potassium   Date Value Ref Range Status   07/11/2018 4.6 3.5 - 5.5 mmol/L Final     Chloride   Date Value Ref Range Status   07/11/2018 107 99 - 109 mmol/L Final     CO2   Date Value Ref Range Status   07/11/2018 27.0 20.0 - 31.0 mmol/L Final     Calcium   Date Value Ref Range Status   07/11/2018 9.5 8.7 - 10.4 mg/dL Final     Total Protein   Date Value Ref Range Status   07/11/2018 7.8 5.7 - 8.2 g/dL Final     Albumin   Date Value Ref Range Status   07/11/2018 4.94 (H) 3.20 - 4.80 g/dL Final     ALT (SGPT)   Date Value Ref Range Status   07/11/2018 28 7 - 40 U/L Final     AST (SGOT)   Date Value Ref Range Status   07/11/2018 24 0 - 33 U/L Final     Alkaline Phosphatase   Date Value Ref Range Status   07/11/2018 138 (H) 25 - 100 U/L Final     Total Bilirubin   Date Value Ref Range Status   07/11/2018 0.8 0.3 - 1.2 mg/dL Final     eGFR Non  Amer   Date Value Ref Range Status   07/11/2018 52 (L) >60 mL/min/1.73 Final     A/G Ratio   Date Value Ref Range Status   07/11/2018 1.7 1.5 - 2.5 g/dL Final     BUN/Creatinine Ratio   Date Value Ref Range Status   07/11/2018 14.0 7.0 - 25.0 Final     Anion Gap   Date Value Ref Range Status   07/11/2018 8.0 3.0 - 11.0 mmol/L Final       Lab Results   Component Value Date    CHOL 157 04/10/2018     Lab Results   Component Value Date    TRIG 110 04/10/2018    TRIG 55 09/25/2015    TRIG 90 07/07/2015     Lab Results   Component Value Date    HDL 39 (L) 04/10/2018    HDL 35 (L) 09/25/2015    HDL 40 07/07/2015     No components found for: LDLCALC  No results found for: VLDL  No results found for: LDLHDL    WBC   Date Value Ref Range Status   07/11/2018 7.35 3.50 - 10.80 10*3/mm3 Final     RBC   Date Value Ref Range Status    07/11/2018 4.59 3.89 - 5.14 10*6/mm3 Final     Hemoglobin   Date Value Ref Range Status   07/11/2018 14.1 11.5 - 15.5 g/dL Final     Hematocrit   Date Value Ref Range Status   07/11/2018 42.7 34.5 - 44.0 % Final     MCV   Date Value Ref Range Status   07/11/2018 93.0 80.0 - 99.0 fL Final     MCH   Date Value Ref Range Status   07/11/2018 30.7 27.0 - 31.0 pg Final     MCHC   Date Value Ref Range Status   07/11/2018 33.0 32.0 - 36.0 g/dL Final     RDW   Date Value Ref Range Status   07/11/2018 13.7 11.3 - 14.5 % Final     RDW-SD   Date Value Ref Range Status   07/11/2018 46.5 37.0 - 54.0 fl Final     MPV   Date Value Ref Range Status   07/11/2018 9.8 6.0 - 12.0 fL Final     Platelets   Date Value Ref Range Status   07/11/2018 230 150 - 450 10*3/mm3 Final     Neutrophil %   Date Value Ref Range Status   07/11/2018 66.3 41.0 - 71.0 % Final     Lymphocyte %   Date Value Ref Range Status   07/11/2018 22.2 (L) 24.0 - 44.0 % Final     Monocyte %   Date Value Ref Range Status   07/11/2018 9.8 0.0 - 12.0 % Final     Eosinophil %   Date Value Ref Range Status   07/11/2018 1.2 0.0 - 3.0 % Final     Basophil %   Date Value Ref Range Status   07/11/2018 0.4 0.0 - 1.0 % Final     Immature Grans %   Date Value Ref Range Status   07/11/2018 0.1 0.0 - 0.6 % Final     Neutrophils, Absolute   Date Value Ref Range Status   07/11/2018 4.87 1.50 - 8.30 10*3/mm3 Final     Lymphocytes, Absolute   Date Value Ref Range Status   07/11/2018 1.63 0.60 - 4.80 10*3/mm3 Final     Monocytes, Absolute   Date Value Ref Range Status   07/11/2018 0.72 0.00 - 1.00 10*3/mm3 Final     Eosinophils, Absolute   Date Value Ref Range Status   07/11/2018 0.09 0.00 - 0.30 10*3/mm3 Final     Basophils, Absolute   Date Value Ref Range Status   07/11/2018 0.03 0.00 - 0.20 10*3/mm3 Final     Immature Grans, Absolute   Date Value Ref Range Status   07/11/2018 0.01 0.00 - 0.03 10*3/mm3 Final         Diagnostic Data:    EKG:  NSR, poor R wave progression    Tele:   NSR    Stress Echocardiogram 6/2015  -shows total excise duration of 6 minutes and 57 seconds.  No EKG changes of exercise.  Patient had a hypertensive reaction to negative echocardiographic stress test with hyperdynamic changes  in all myocardial segments of the post exercise.  EF 85% post exercise.    Event Monitor 7/2018  -Monitor was worn for 3 days 3 hours and 50 minutes  -No atrial fibrillation detected.  -NSR 99% of the time  -Bradycardiac 1% of the time  -Tachycardic <1 % of the time.  -Baseline rhythm NSR with rate of 66.8 BPM.  -2 stable events, NSR    TTE 7/2015  1. There is evidence of a hypertrophic cardiomyopathy.   2. There is basal septal hypertrophy without evidence of systolic  anterior motion.  3. The left ventricle appears hyperdynamic.  4. Abnormal left ventricular diastolic filling is observed, consistent  with impaired relaxation.   5. The right ventricle is slightly dilated.   6. There is no evidence of aortic stenosis.  7. There is mitral annular calcification.  8. There is mild mitral regurgitation.   9. No pulmonary hypertension is noted.  10. A trivial pericardial effusion is visualized.  11. There is no dilatation of the aortic root.  12. The venous system appears normal.    SIMRAN 2/2016  -estimated ejection fraction of greater than 65%.  Hyperdynamic changes all myocardial segments.  No evidence of LVOT obstruction or MYLES, consistent with hypertensive changes and not hypertrophic obstructive cardiomyopathy.     Assessment        Assessment and Plan:   ASSESSMENT:  -Shortness of breath, improved with administration of IV Lasix, known history of COPD, PE. CTA negative for PE. Volume overload possibly due to acute on chronic diastolic heart failure but BNP unremarkable. Dyspnea/swelling improved with Lasix  -Chest and epigastric pain, atypical in nature, recent diagnosis of Left sided breast cancer with bony metastasizes.  Notable amount of stool in left epigastric area on CTA.  -Metastatic  breast cancer  -Atrial fibrillation, diagnosis is not clear, data deficient; if details could be found to confirm a prior diagnosis, would recommend anticoagulation. [id number]= 6.  -Hypertension, stable this admission  -Diastolic heart failure  -COPD  -History of DVT    PLAN:  -Agree with echocardiogram as ordered.  -Lasix 20mg IVP today  -Would defer ischemic evaluation at this time  -There is a notable amount of stool in the left epigastric area on CTA, laxatives per primary team  -Unclear history of atrial fibrillation. If further details could be solicited to confirm paroxysmal AFib, would recommend anti coagulation      PRAFUL Girard obtained past medical, family history, social history, review of systems and functioned as a scribe for the remainder of the dictation for Dr. Davis.    7/12/2018    I, Zohaib Davis MD, personally performed the services as scribed by the above named individual. I have made any necessary edits and it is both accurate and complete.     Zohaib Davis MD, MSc, [facility]  Interventional Cardiology  [facility]          Electronically signed by Zohaib Davis MD at 7/12/2018 11:13 AM     Mary Mendoza MD at 7/12/2018  7:24 AM      Consult Orders:    1. Hematology & Oncology Inpatient Consult [[id number]] ordered by Ania De Leon DO at 07/11/18 1254                  Subjective     PROBLEM LIST:  1. [id number] invasive ductal carcinoma of the left breast, ER+, AR+, Her2 negative  A) presented with a palpable mass in the left breast as well as skin thickening.  biopsy showed multifocal low grade IDC.  Skin biopsy negative.  B) PET/CT 6/6/18 showed widespread bony metastatic disease  2. History of right breast cancer 2001, treated with lumpectomy and SLNB, ? CMF, and adjuvant hormonal therapy  3. Anxiety/depression  4. Rheumatoid arthritis  5. Asthma  6. CHF  7. COPD  8. Diabetes mellitus type 2  9. GERD  10. HTN  11. HL  12. CVA  13. Tobacco abuse  14.  choriocarcinoma  15. Fibromyalgia  16. Atrial fibrillation       CHIEF COMPLAINT: shortness of breath, pain    HISTORY OF PRESENT ILLNESS:  The patient is a 62 y.o. female with recently diagnosed metastatic breast cancer, admitted from clinic yesterday with uncontrolled pain, dyspnea, abdominal pain, and vomiting.    She feels somewhat better today after diuresis.    Breathing is improved.  Still having some trouble swallowing and keeping food down.  Still concerned about sharp abdominal pain she has when bending over.  Reports having daily small bowel movements.    REVIEW OF SYSTEMS:  A 14 point review of systems was performed and is negative except as noted above.    Past Medical History:   Diagnosis Date   • Arthritis    • Asthma    • Atrial fibrillation (CMS/HCC)    • Breast cancer (CMS/[facility])    • CHF (congestive heart failure) (CMS/[facility])    • Chronic kidney disease (CKD)    • COPD (chronic obstructive pulmonary disease) (CMS/HCC)    • CVA (cerebral vascular accident) (CMS/[facility])    • Diabetes mellitus, type 2 (CMS/HCC)    • GERD (gastroesophageal reflux disease)    • Hypercholesteremia    • Hypertension    • Myocardial infarction    • Osteoarthritis    • Psoriatic arthritis (CMS/HCC)    • Pulmonary embolism (CMS/HCC)    • Rheumatoid arthritis (CMS/HCC)    • Sciatica    • Smoker    • UTI (urinary tract infection)        No current facility-administered medications on file prior to encounter.      Current Outpatient Prescriptions on File Prior to Encounter   Medication Sig Dispense Refill   • albuterol (PROVENTIL HFA;VENTOLIN HFA) 108 (90 Base) MCG/ACT inhaler Inhale 2 puffs Every 4 (Four) Hours As Needed for Wheezing. 1 inhaler 2   • aluminum acetate (DOMEBORO) pack packet Apply  topically 3 (Three) Times a Day As Needed (psoriatic arthritis). 100 each 6   • amLODIPine (NORVASC) 10 MG tablet TAKE ONE TABLET BY MOUTH DAILY 30 tablet 0   • atorvastatin (LIPITOR) 20 MG tablet Take 1 tablet by mouth Every Night. 90  tablet 0   • carvedilol (COREG) 12.5 MG tablet TAKE ONE TABLET BY MOUTH TWICE A DAY WITH MEALS 60 tablet 0   • Cranberry 600 MG tablet Take 1 tablet by mouth Daily.     • glucose blood test strip Use as instructed 100 each 12   • glucose monitor monitoring kit 1 each 3 (Three) Times a Day As Needed (hypo/hyperglycemia). 1 each 3   • Lancets (ACCU-CHEK SOFT TOUCH) lancets Three times daily and as needed 100 each 12   • letrozole (FEMARA) 2.5 MG tablet Take 1 tablet by mouth Daily. 30 tablet 11   • ondansetron (ZOFRAN) 8 MG tablet Take 1 tablet by mouth 3 (Three) Times a Day As Needed for Nausea or Vomiting. 30 tablet 5   • pantoprazole (PROTONIX) 40 MG EC tablet Take 1 tablet by mouth Daily. 90 tablet 2       Allergies   Allergen Reactions   • Ampicillin Anaphylaxis   • Erythromycin Anaphylaxis   • Morphine And Related Anaphylaxis   • Oxycodone-Acetaminophen Anaphylaxis   • Oxycodone-Aspirin Anaphylaxis   • Penicillins Anaphylaxis   • Sulfa Antibiotics Swelling and Rash   • Aspirin GI Bleeding   • Codeine Swelling   • Contrast Dye Swelling   • Ibuprofen GI Bleeding   • Latex Arrhythmia   • Propoxyphene Nausea And Vomiting   • Saccharin Nausea And Vomiting   • Tramadol Swelling       Past Surgical History:   Procedure Laterality Date   • BLADDER SURGERY     • BREAST BIOPSY     • BREAST LUMPECTOMY     • BRONCHOSCOPY N/A 5/9/2018    Procedure: BRONCHOSCOPY WITH ENDOBRONCHIAL ULTRASOUND AND NAVIGATION WITH FLUORO;  Surgeon: Nixon Mulligan MD;  Location: [facility] ENDOSCOPY;  Service: Pulmonary   • HYSTERECTOMY     • OOPHORECTOMY     • TUMOR REMOVAL      fallopian tube   • US GUIDED FINE NEEDLE ASPIRATION  5/3/2018           Social History     Social History   • Marital status: Legally    • Number of children: 3     Occupational History   • caretaker      retired     Social History Main Topics   • Smoking status: Light Tobacco Smoker     Packs/day: 0.25     Years: 46.00     Types: Cigarettes   • Smokeless  tobacco: Never Used      Comment: has attempted to quit this week   • Alcohol use No   • Drug use: No   • Sexual activity: Defer     Other Topics Concern   • Not on file     Social History Narrative    The patient lives with her niece in [place].    Caffeine: sometimes a pot of coffee and sometimes none.           Family History   Problem Relation Age of Onset   • Breast cancer Cousin         Maternal cousins x 3 with breast cancer   • Cancer Mother         Uterine, colon   • Aneurysm Mother    • Cancer Father         Thyroid   • Aneurysm Father        Objective     Vitals:    07/11/18 2305 07/12/18 0241 07/12/18 0355 07/12/18 0714   BP:   109/60    BP Location:   Right arm    Patient Position:   Lying    Pulse: 72 71 62 72   Resp: 17 16 16 16   Temp:   97.7 °F (36.5 °C)    TempSrc:   Oral    SpO2: 92% 93%  94%   Weight:       Height:              General:  female in no acute distress  Neuro: alert and oriented  HEENT: sclera anicteric, oropharynx clear  Lymphatics: no cervical, supraclavicular, or axillary adenopathy  Cardiovascular: regular rate and rhythm, no murmurs  Lungs: clear to auscultation bilaterally  Breast: The left breast is swollen, with erythema and thickening of the skin.   Abdomen: soft, nontender, nondistended.  No palpable organomegaly  Extremeties:1+ bilateral LE edema  Skin: no rashes, lesions, bruising, or petechiae  Psych: tearful      Imaging:  CT chest shows no evidence of PE.  Pulmonary nodule, axillary disease, and bony metastases stable as well.      Assessment/Plan     Betsy Chi is a 62 y.o. year old female with metastatic breast cancer, admitted for pain management, dyspnea, evaluation of abdominal pain/vomiting.    Metastatic breast cancer: continue letrozole.  Will plan to add ibrance once GI issues have improved.  Give dose of zometa today.    Abdominal pain/vomiting: etiology unclear.  Reviewed CT with radiology - no significant abdominal distention or stool.  Continue  PPI.   Consider swallow eval.    Pain: palliative care consult.  She would benefit from outpatient follow up with palliative care as well.  We can consider radiation if she doesn't have improvement with systemic therapy.          Mary Mendoza MD    7/12/2018          Electronically signed by Mary Mendoza MD at 7/12/2018 12:31 PM

## 2018-07-12 NOTE — CONSULTS
[facility]   [address], [address]  [place], [place], [address] [phone]  WWW.[facility]           INPATIENT CONSULTATION NOTE    Referring Physician: MD Sheng Rosario Md  [address] [place] [address] [address]  [place] [address]    Patient Care Team:  Patient Care Team:  PRAFUL Wheatley as PCP - General (Nurse Practitioner)    Reason for consultation: Chest Pain           HPI:    Betsy Chi is a 62 y.o. female.  History of Present Illness    The patient has a history of atrial fibrillation, diastolic heart failure, CVA, COPD, DVT, Breast cancer, diabetes mellitus, rheumatoid arthritis, psoriatic arthritis, osteoarthritis, hypertension, hyperlipidemia, and cirrhosis.    The patient presented to [facility] after being seen yesterday by Dr. Mary Mendoza.  She was tachypneic on exam and was believed to need diuresis so the hospitalist service was contacted and she was admitted.  The patient reports that she has had worsening shortness of breath since May.  She also reports intermittent episodes of chest pain.  These episodes occur with or without exertion, are brief, and described as alternating between stabbing and shocking. The pain occurs under her left breast and epigastric area and radiates to the back.  She reports improvement in her symptoms since receiving IV Lasix yesterday.  She denies any current chest pain. She does report that her generalized pain is uncontrolled.  She does have intermittent episodes of dizziness.    She has formerly seen Dr. Jones in the past.  She is a current smoker, reports 5 cigarettes per day that she does not inhale.  She has a 40+ year history.  She reports rare alcohol consumption.  She denies any drug abuse.     PFSH:  Patient Active Problem List   Diagnosis   • Mixed anxiety depressive disorder   • Asthma   • Coronary arteriosclerosis in native artery   • Atrial fibrillation (CMS/HCC)   • Biliary dyskinesia   •  History of Cerebral infarction (CT scan of brain this hospitalization normal)   • Chronic bronchitis (CMS/HCC)   • Chronic low back pain   • Exacerbation of COPD/acute bronchitis in an active smoker   • Chronic urinary tract infection   • Hepatic cirrhosis (CMS/HCC)   • Type 2 diabetes mellitus (CMS/HCC)   • Dyslipidemia   • Fibromyalgia   • Gastroesophageal reflux disease without esophagitis   • Hypertension   • Disorder of kidney   • Disorder of endocrine system   • Osteoarthritis   • Osteoporosis   • Pulmonary nodule right lower lobe medially (2 x 1.5 cm).   • Rheumatoid arthritis (CMS/HCC)   • Seasonal allergic rhinitis   • Biliary sludge   • Peptic ulcer of stomach   • Acute on chronic hypoxemic respiratory failure not requiring ventilatory support   • Invasive ductal carcinoma of breast, left (CMS/HCC)   • Bone metastases (CMS/HCC)   • Open angle with borderline findings and low glaucoma risk in both eyes   • Palpitations       No current facility-administered medications on file prior to encounter.      Current Outpatient Prescriptions on File Prior to Encounter   Medication Sig Dispense Refill   • albuterol (PROVENTIL HFA;VENTOLIN HFA) 108 (90 Base) MCG/ACT inhaler Inhale 2 puffs Every 4 (Four) Hours As Needed for Wheezing. 1 inhaler 2   • aluminum acetate (DOMEBORO) pack packet Apply  topically 3 (Three) Times a Day As Needed (psoriatic arthritis). 100 each 6   • amLODIPine (NORVASC) 10 MG tablet TAKE ONE TABLET BY MOUTH DAILY 30 tablet 0   • atorvastatin (LIPITOR) 20 MG tablet Take 1 tablet by mouth Every Night. 90 tablet 0   • carvedilol (COREG) 12.5 MG tablet TAKE ONE TABLET BY MOUTH TWICE A DAY WITH MEALS 60 tablet 0   • Cranberry 600 MG tablet Take 1 tablet by mouth Daily.     • glucose blood test strip Use as instructed 100 each 12   • glucose monitor monitoring kit 1 each 3 (Three) Times a Day As Needed (hypo/hyperglycemia). 1 each 3   • Lancets (ACCU-CHEK SOFT TOUCH) lancets Three times daily and as  needed 100 each 12   • letrozole (FEMARA) 2.5 MG tablet Take 1 tablet by mouth Daily. 30 tablet 11   • ondansetron (ZOFRAN) 8 MG tablet Take 1 tablet by mouth 3 (Three) Times a Day As Needed for Nausea or Vomiting. 30 tablet 5   • pantoprazole (PROTONIX) 40 MG EC tablet Take 1 tablet by mouth Daily. 90 tablet 2     Allergies   Allergen Reactions   • Ampicillin Anaphylaxis   • Erythromycin Anaphylaxis   • Morphine And Related Anaphylaxis   • Oxycodone-Acetaminophen Anaphylaxis   • Oxycodone-Aspirin Anaphylaxis   • Penicillins Anaphylaxis   • Sulfa Antibiotics Swelling and Rash   • Aspirin GI Bleeding   • Codeine Swelling   • Contrast Dye Swelling   • Ibuprofen GI Bleeding   • Latex Arrhythmia   • Propoxyphene Nausea And Vomiting   • Saccharin Nausea And Vomiting   • Tramadol Swelling       Social History     Social History   • Marital status: Legally    • Number of children: 3     Occupational History   • caretaker      retired     Social History Main Topics   • Smoking status: Light Tobacco Smoker     Packs/day: 0.25     Years: 46.00     Types: Cigarettes   • Smokeless tobacco: Never Used      Comment: has attempted to quit this week   • Alcohol use No   • Drug use: No   • Sexual activity: Defer     Other Topics Concern   • Not on file     Social History Narrative    The patient lives with her niece in [place].    Caffeine: sometimes a pot of coffee and sometimes none.         Family History   Problem Relation Age of Onset   • Breast cancer Cousin         Maternal cousins x 3 with breast cancer   • Cancer Mother         Uterine, colon   • Aneurysm Mother    • Cancer Father         Thyroid   • Aneurysm Father        Review of Systems:  Positive for dyspnea with exertion, chest pain, lower extremity edema, orthopnea, dizziness  Negative for syncope.   All other systems reviewed are negative.         Objective:     Vital Sign Min/Max for last 24 hours  Temp  Min: 97.3 °F (36.3 °C)  Max: 98.6 °F (37 °C)    BP  Min: 101/54  Max: 120/78   Pulse  Min: 62  Max: 100   Resp  Min: 15  Max: 36   SpO2  Min: 92 %  Max: 97 %   No Data Recorded      Intake/Output Summary (Last 24 hours) at 07/12/18 0921  Last data filed at 07/12/18 0200   Gross per 24 hour   Intake              120 ml   Output             1000 ml   Net             -880 ml           Vitals:    07/12/18 0833   BP: 104/55   Pulse: 78   Resp:    Temp:    SpO2:        CONSTITUTIONAL: Well-nourished. In no acute distress.   SKIN: Warm and dry. No rashes noted   HEENT: Head is normocephalic and atraumatic. Mucous membranes are pink and moist.   NECK: Supple without masses or thyromegaly. There is no jugular venous distention at 30°.  LUNGS: Normal effort. Clear to auscultation bilaterally without wheezing, rhonchi, or rales noted.   CARDIOVASCULAR: The heart has a regular rate with a normal S1 and S2. There is no murmur, gallop, rub, or click appreciated.   PERIPHERAL VASCULAR: Carotid upstroke is 2+ bilaterally and without bruits. Radial pulses are 2+ bilaterally. Posterior tibial pulses are 2+ and symmetrical. There is mild lower extremity edema.   ABDOMEN: Soft with no tenderness with palpitation. No hepatosplenomegaly  MUSCULOSKELETAL:  No digital cyanosis  NEUROLOGICAL: Nonfocal.  PSYCHIATRIC: Alert, orientated x 3, tearful.     Labs:  Lab Results   Component Value Date    TROPONINI <0.006 07/11/2018       Glucose   Date Value Ref Range Status   07/11/2018 102 (H) 70 - 100 mg/dL Final     BUN   Date Value Ref Range Status   07/11/2018 15 9 - 23 mg/dL Final     Creatinine   Date Value Ref Range Status   07/11/2018 1.07 0.60 - 1.30 mg/dL Final     Sodium   Date Value Ref Range Status   07/11/2018 142 132 - 146 mmol/L Final     Potassium   Date Value Ref Range Status   07/11/2018 4.6 3.5 - 5.5 mmol/L Final     Chloride   Date Value Ref Range Status   07/11/2018 107 99 - 109 mmol/L Final     CO2   Date Value Ref Range Status   07/11/2018 27.0 20.0 - 31.0 mmol/L Final      Calcium   Date Value Ref Range Status   07/11/2018 9.5 8.7 - 10.4 mg/dL Final     Total Protein   Date Value Ref Range Status   07/11/2018 7.8 5.7 - 8.2 g/dL Final     Albumin   Date Value Ref Range Status   07/11/2018 4.94 (H) 3.20 - 4.80 g/dL Final     ALT (SGPT)   Date Value Ref Range Status   07/11/2018 28 7 - 40 U/L Final     AST (SGOT)   Date Value Ref Range Status   07/11/2018 24 0 - 33 U/L Final     Alkaline Phosphatase   Date Value Ref Range Status   07/11/2018 138 (H) 25 - 100 U/L Final     Total Bilirubin   Date Value Ref Range Status   07/11/2018 0.8 0.3 - 1.2 mg/dL Final     eGFR Non  Amer   Date Value Ref Range Status   07/11/2018 52 (L) >60 mL/min/1.73 Final     A/G Ratio   Date Value Ref Range Status   07/11/2018 1.7 1.5 - 2.5 g/dL Final     BUN/Creatinine Ratio   Date Value Ref Range Status   07/11/2018 14.0 7.0 - 25.0 Final     Anion Gap   Date Value Ref Range Status   07/11/2018 8.0 3.0 - 11.0 mmol/L Final       Lab Results   Component Value Date    CHOL 157 04/10/2018     Lab Results   Component Value Date    TRIG 110 04/10/2018    TRIG 55 09/25/2015    TRIG 90 07/07/2015     Lab Results   Component Value Date    HDL 39 (L) 04/10/2018    HDL 35 (L) 09/25/2015    HDL 40 07/07/2015     No components found for: LDLCALC  No results found for: VLDL  No results found for: LDLHDL    WBC   Date Value Ref Range Status   07/11/2018 7.35 3.50 - 10.80 10*3/mm3 Final     RBC   Date Value Ref Range Status   07/11/2018 4.59 3.89 - 5.14 10*6/mm3 Final     Hemoglobin   Date Value Ref Range Status   07/11/2018 14.1 11.5 - 15.5 g/dL Final     Hematocrit   Date Value Ref Range Status   07/11/2018 42.7 34.5 - 44.0 % Final     MCV   Date Value Ref Range Status   07/11/2018 93.0 80.0 - 99.0 fL Final     MCH   Date Value Ref Range Status   07/11/2018 30.7 27.0 - 31.0 pg Final     MCHC   Date Value Ref Range Status   07/11/2018 33.0 32.0 - 36.0 g/dL Final     RDW   Date Value Ref Range Status   07/11/2018 13.7  11.3 - 14.5 % Final     RDW-SD   Date Value Ref Range Status   07/11/2018 46.5 37.0 - 54.0 fl Final     MPV   Date Value Ref Range Status   07/11/2018 9.8 6.0 - 12.0 fL Final     Platelets   Date Value Ref Range Status   07/11/2018 230 150 - 450 10*3/mm3 Final     Neutrophil %   Date Value Ref Range Status   07/11/2018 66.3 41.0 - 71.0 % Final     Lymphocyte %   Date Value Ref Range Status   07/11/2018 22.2 (L) 24.0 - 44.0 % Final     Monocyte %   Date Value Ref Range Status   07/11/2018 9.8 0.0 - 12.0 % Final     Eosinophil %   Date Value Ref Range Status   07/11/2018 1.2 0.0 - 3.0 % Final     Basophil %   Date Value Ref Range Status   07/11/2018 0.4 0.0 - 1.0 % Final     Immature Grans %   Date Value Ref Range Status   07/11/2018 0.1 0.0 - 0.6 % Final     Neutrophils, Absolute   Date Value Ref Range Status   07/11/2018 4.87 1.50 - 8.30 10*3/mm3 Final     Lymphocytes, Absolute   Date Value Ref Range Status   07/11/2018 1.63 0.60 - 4.80 10*3/mm3 Final     Monocytes, Absolute   Date Value Ref Range Status   07/11/2018 0.72 0.00 - 1.00 10*3/mm3 Final     Eosinophils, Absolute   Date Value Ref Range Status   07/11/2018 0.09 0.00 - 0.30 10*3/mm3 Final     Basophils, Absolute   Date Value Ref Range Status   07/11/2018 0.03 0.00 - 0.20 10*3/mm3 Final     Immature Grans, Absolute   Date Value Ref Range Status   07/11/2018 0.01 0.00 - 0.03 10*3/mm3 Final         Diagnostic Data:    EKG:  NSR, poor R wave progression    Tele:  NSR    Stress Echocardiogram 6/2015  -shows total excise duration of 6 minutes and 57 seconds.  No EKG changes of exercise.  Patient had a hypertensive reaction to negative echocardiographic stress test with hyperdynamic changes  in all myocardial segments of the post exercise.  EF 85% post exercise.    Event Monitor 7/2018  -Monitor was worn for 3 days 3 hours and 50 minutes  -No atrial fibrillation detected.  -NSR 99% of the time  -Bradycardiac 1% of the time  -Tachycardic <1 % of the  time.  -Baseline rhythm NSR with rate of 66.8 BPM.  -2 stable events, NSR    TTE 7/2015  1. There is evidence of a hypertrophic cardiomyopathy.   2. There is basal septal hypertrophy without evidence of systolic  anterior motion.  3. The left ventricle appears hyperdynamic.  4. Abnormal left ventricular diastolic filling is observed, consistent  with impaired relaxation.   5. The right ventricle is slightly dilated.   6. There is no evidence of aortic stenosis.  7. There is mitral annular calcification.  8. There is mild mitral regurgitation.   9. No pulmonary hypertension is noted.  10. A trivial pericardial effusion is visualized.  11. There is no dilatation of the aortic root.  12. The venous system appears normal.    SIMRAN 2/2016  -estimated ejection fraction of greater than 65%.  Hyperdynamic changes all myocardial segments.  No evidence of LVOT obstruction or MYLES, consistent with hypertensive changes and not hypertrophic obstructive cardiomyopathy.             Assessment and Plan:   ASSESSMENT:  -Shortness of breath, improved with administration of IV Lasix, known history of COPD, PE. CTA negative for PE. Volume overload possibly due to acute on chronic diastolic heart failure but BNP unremarkable. Dyspnea/swelling improved with Lasix  -Chest and epigastric pain, atypical in nature, recent diagnosis of Left sided breast cancer with bony metastasizes.  Notable amount of stool in left epigastric area on CTA.  -Metastatic breast cancer  -Atrial fibrillation, diagnosis is not clear, data deficient; if details could be found to confirm a prior diagnosis, would recommend anticoagulation. [id number]= 6.  -Hypertension, stable this admission  -Diastolic heart failure  -COPD  -History of DVT    PLAN:  -Agree with echocardiogram as ordered.  -Lasix 20mg IVP today  -Would defer ischemic evaluation at this time  -There is a notable amount of stool in the left epigastric area on CTA, laxatives per primary team  -Unclear  history of atrial fibrillation. If further details could be solicited to confirm paroxysmal AFib, would recommend anti coagulation      PRAFUL Girard obtained past medical, family history, social history, review of systems and functioned as a scribe for the remainder of the dictation for Dr. Davis.    7/12/2018    I, Zohaib Davis MD, personally performed the services as scribed by the above named individual. I have made any necessary edits and it is both accurate and complete.     Zohaib Davis MD, MSc, [facility]  Interventional Cardiology  [facility]

## 2018-07-12 NOTE — PROGRESS NOTES
Discharge Planning Assessment  [facility]     Patient Name: Betsy Chi  MRN: [id number]  Today's Date: 7/12/2018    Admit Date: 7/11/2018          Discharge Needs Assessment     Row Name 07/12/18 1446       Living Environment    Lives With other (see comments)   her nephew    Current Living Arrangements home/apartment/condo    Primary Care Provided by self    Provides Primary Care For no one    Family Caregiver if Needed other relative(s)    Quality of Family Relationships involved;supportive    Able to Return to Prior Arrangements yes       Resource/Environmental Concerns    Resource/Environmental Concerns none       Transition Planning    Patient/Family Anticipates Transition to home with family    Patient/Family Anticipated Services at Transition     Transportation Anticipated family or friend will provide       Discharge Needs Assessment    Readmission Within the Last 30 Days no previous admission in last 30 days    Concerns to be Addressed discharge planning    Equipment Currently Used at Home oxygen;rollator;cane, straight   oxygen through [employer]    Anticipated Changes Related to Illness none            Discharge Plan     Row Name 07/12/18 1449       Plan    Plan Home    Patient/Family in Agreement with Plan yes    Plan Comments Spoke with patient in room to initiate discharge planning.  She lives with her nephew in [facility].  Prior to admission, she was independent with ADL's, using a rollator to assist with ambulation.  She has never had home health services and has home oxygen through [employer].  Ms. Chi has RX coverage and has her scripts filled at [facility] in [place].  Her plan is to return home to her nephew's at discharge.  CM will continue to follow.  Ida Espinoza RN [phone]            Destination     No service coordination in this encounter.      Durable Medical Equipment     No service coordination in this encounter.       Dialysis/Infusion     No service coordination in this encounter.      Home Medical Care     No service coordination in this encounter.      Social Care     No service coordination in this encounter.                Demographic Summary     Row Name 07/12/18 1445       General Information    Admission Type inpatient    Arrived From physician office    Referral Source admission list    Reason for Consult discharge planning    Preferred Language English     Used During This Interaction no    General Information Comments PCP- Freddy Maldonado            Functional Status     Row Name 07/12/18 1446       Functional Status    Usual Activity Tolerance fair    Current Activity Tolerance fair       Functional Status, IADL    Medications independent    Meal Preparation assistive equipment and person    Housekeeping assistive equipment and person    Laundry assistive equipment and person    Shopping assistive equipment and person            Psychosocial    No documentation.           Abuse/Neglect    No documentation.           Legal     Row Name 07/12/18 1446       Financial/Legal    Finance Comments Verified with patient that she has Passport.  No issues obtaining medications.            Substance Abuse    No documentation.           Patient Forms    No documentation.         Ida Espinoza RN

## 2018-07-12 NOTE — PLAN OF CARE
Problem: Patient Care Overview  Goal: Plan of Care Review  Outcome: Ongoing (interventions implemented as appropriate)   07/12/18 1250   Coping/Psychosocial   Plan of Care Reviewed With patient   Plan of Care Review   Progress no change       Problem: Fall Risk (Adult)  Goal: Absence of Fall  Outcome: Ongoing (interventions implemented as appropriate)   07/12/18 1250   Fall Risk (Adult)   Absence of Fall making progress toward outcome

## 2018-07-12 NOTE — CONSULTS
PRAFUL Wheatley  Consulting physician: Reji    No chief complaint on file.        HPI: Patient is a 62-year-old female with a history of metastatic breast cancer.  Patient came in hospital due to dyspnea.  Patient states that since getting a dose of Lasix she feels that her breathing has improved significantly.  States that she thinks that another dose of Lasix will continue to help improve her symptoms, nursing staff does state they're going to give her a dose as soon as we get IV access.    I did also talk to the patient about any type of pain that she has.  Patient does admit to some sporadic pain but nothing that is consistent.  Her main symptoms appear to be related to some dysphagia that she is having since she had a stroke.      Past Medical History:   Diagnosis Date   • Arthritis    • Asthma    • Atrial fibrillation (CMS/HCC)    • Breast cancer (CMS/HCC)    • CHF (congestive heart failure) (CMS/HCC)    • Chronic kidney disease (CKD)    • COPD (chronic obstructive pulmonary disease) (CMS/HCC)    • CVA (cerebral vascular accident) (CMS/HCC)    • Diabetes mellitus, type 2 (CMS/HCC)    • GERD (gastroesophageal reflux disease)    • Hypercholesteremia    • Hypertension    • Myocardial infarction    • Osteoarthritis    • Psoriatic arthritis (CMS/HCC)    • Pulmonary embolism (CMS/HCC)    • Rheumatoid arthritis (CMS/HCC)    • Sciatica    • Smoker    • UTI (urinary tract infection)      Past Surgical History:   Procedure Laterality Date   • BLADDER SURGERY     • BREAST BIOPSY     • BREAST LUMPECTOMY     • BRONCHOSCOPY N/A 5/9/2018    Procedure: BRONCHOSCOPY WITH ENDOBRONCHIAL ULTRASOUND AND NAVIGATION WITH FLUORO;  Surgeon: Nixon Mulligan MD;  [facility];  Service: Pulmonary   • HYSTERECTOMY     • OOPHORECTOMY     • TUMOR REMOVAL      fallopian tube   • US GUIDED FINE NEEDLE ASPIRATION  5/3/2018     Current Code Status     Date Active Code Status Order ID Comments User Context        7/11/2018 12:54 PM CPR [id number]  Ania L Atkins, DO Inpatient       Questions for Current Code Status     Question Answer Comment    Code Status CPR     Medical Interventions (Level of Support Prior to Arrest) Full     Level Of Support Discussed With Patient         Current Facility-Administered Medications   Medication Dose Route Frequency Provider Last Rate Last Dose   • atorvastatin (LIPITOR) tablet 20 mg  20 mg Oral Nightly Ania L Atkins, DO   20 mg at 07/11/18 2150   • carvedilol (COREG) tablet 3.125 mg  3.125 mg Oral BID With Meals Ania L Atkins, DO   3.125 mg at 07/12/18 0833   • dextrose (D50W) solution 25 g  25 g Intravenous Q15 Min PRN Ania L Atkins, DO       • dextrose (GLUTOSE) oral gel 15 g  15 g Oral Q15 Min PRN Ania L Atkins, DO       • diphenhydrAMINE (BENADRYL) injection 25 mg  25 mg Intravenous Q6H PRN Josette Lares MD   25 mg at 07/12/18 1058    Or   • diphenhydrAMINE (BENADRYL) injection 25 mg  25 mg Intramuscular Q6H PRN Josette Lares MD       • furosemide (LASIX) injection 20 mg  20 mg Intravenous Once Zohaib Davis MD       • glucagon (human recombinant) (GLUCAGEN DIAGNOSTIC) injection 1 mg  1 mg Subcutaneous PRN Ania L Atkins, DO       • heparin (porcine) 5000 UNIT/ML injection 5,000 Units  5,000 Units Subcutaneous Q8H Ania L Atkins, DO   5,000 Units at 07/12/18 0536   • insulin lispro (humaLOG) injection 0-7 Units  0-7 Units Subcutaneous 4x Daily With Meals & Nightly Ania L Atkins, DO       • ipratropium-albuterol (DUO-NEB) nebulizer solution 3 mL  3 mL Nebulization Q4H - RT Ania L Atkins, DO   3 mL at 07/12/18 1210   • letrozole (FEMARA) tablet 2.5 mg  2.5 mg Oral Daily Ania L Atkins, DO   2.5 mg at 07/12/18 0833   • ondansetron (ZOFRAN) injection 4 mg  4 mg Intravenous Q6H PRN Ania L Atkins, DO       • pantoprazole (PROTONIX) EC tablet 40 mg  40 mg Oral Q AM Ania De Leon DO   40 mg at 07/12/18 0536   • Pharmacy Consult - MTM   Does not apply Daily  "Evelyne Rai, [facility]       • sodium chloride 0.9 % flush 1-10 mL  1-10 mL Intravenous PRN Ania De Leon,             dextrose  •  dextrose  •  diphenhydrAMINE **OR** diphenhydrAMINE  •  glucagon (human recombinant)  •  ondansetron  •  sodium chloride  Allergies   Allergen Reactions   • Ampicillin Anaphylaxis   • Erythromycin Anaphylaxis   • Morphine And Related Anaphylaxis   • Oxycodone-Acetaminophen Anaphylaxis   • Oxycodone-Aspirin Anaphylaxis   • Penicillins Anaphylaxis   • Sulfa Antibiotics Swelling and Rash   • Aspirin GI Bleeding   • Codeine Swelling   • Contrast Dye Swelling   • Ibuprofen GI Bleeding   • Latex Arrhythmia   • Propoxyphene Nausea And Vomiting   • Saccharin Nausea And Vomiting   • Tramadol Swelling     Family History   Problem Relation Age of Onset   • Breast cancer Cousin         Maternal cousins x 3 with breast cancer   • Cancer Mother         Uterine, colon   • Aneurysm Mother    • Cancer Father         Thyroid   • Aneurysm Father      Social History     Social History   • Marital status: Legally      Spouse name: N/A   • Number of children: 3   • Years of education: N/A     Occupational History   • caretaker      retired     Social History Main Topics   • Smoking status: Light Tobacco Smoker     Packs/day: 0.25     Years: 46.00     Types: Cigarettes   • Smokeless tobacco: Never Used      Comment: has attempted to quit this week   • Alcohol use No   • Drug use: No   • Sexual activity: Defer     Other Topics Concern   • Not on file     Social History Narrative    The patient lives with her niece in [place].    Caffeine: sometimes a pot of coffee and sometimes none.         Review of Systems - All others reviewed and found negative except as mentioned in the history of present illness      BP 95/62   Pulse 82   Temp 97.9 °F (36.6 °C) (Oral)   Resp 16   Ht 160 cm (62.99\")   Wt 82.1 kg (181 lb)   SpO2 91%   BMI 32.07 kg/m²     Intake/Output Summary (Last 24 hours) at " 07/12/18 1245  Last data filed at 07/12/18 0900   Gross per 24 hour   Intake              360 ml   Output             1000 ml   Net             -640 ml     Physical Exam:      General Appearance:    Alert, cooperative, in no acute distress   Head:    Normocephalic, without obvious abnormality, atraumatic   Eyes:            Lids and lashes normal, conjunctivae and sclerae normal, no   icterus, no pallor, corneas clear, PERRLA   Ears:    Ears appear intact with no abnormalities noted   Throat:   No oral lesions, no thrush, oral mucosa moist   Neck:   No adenopathy, supple, trachea midline, no thyromegaly, no     carotid bruit, no JVD   Back:     No kyphosis present, no scoliosis present, no skin lesions,       erythema or scars, no tenderness to percussion or                   palpation,   range of motion normal   Lungs:     Clear to auscultation,respirations regular, even and                   unlabored    Heart:    Regular rhythm and normal rate, normal S1 and S2, no            murmur, no gallop, no rub, no click   Breast Exam:    Deferred   Abdomen:     Normal bowel sounds, no masses, no organomegaly, soft        non-tender, non-distended, no guarding, no rebound                 tenderness   Genitalia:    Deferred   Extremities:   Moves all extremities well, no edema, no cyanosis, no              redness   Pulses:   Pulses palpable and equal bilaterally   Skin:   No bleeding, bruising or rash   Lymph nodes:   No palpable adenopathy   Neurologic:   Cranial nerves 2 - 12 grossly intact, sensation intact, DTR        present and equal bilaterally         Results from last 7 days  Lab Units 07/11/18  1308   WBC 10*3/mm3 7.35   HEMOGLOBIN g/dL 14.1   HEMATOCRIT % 42.7   PLATELETS 10*3/mm3 230       Results from last 7 days  Lab Units 07/11/18  1308   SODIUM mmol/L 142   POTASSIUM mmol/L 4.6   CHLORIDE mmol/L 107   CO2 mmol/L 27.0   BUN mg/dL 15   CREATININE mg/dL 1.07   CALCIUM mg/dL 9.5   BILIRUBIN mg/dL 0.8   ALK PHOS  U/L 138*   ALT (SGPT) U/L 28   AST (SGOT) U/L 24   GLUCOSE mg/dL 102*       Results from last 7 days  Lab Units 07/11/18  1308   SODIUM mmol/L 142   POTASSIUM mmol/L 4.6   CHLORIDE mmol/L 107   CO2 mmol/L 27.0   BUN mg/dL 15   CREATININE mg/dL 1.07   GLUCOSE mg/dL 102*   CALCIUM mg/dL 9.5     Imaging Results (last 72 hours)     Procedure Component Value Units Date/Time    CT Angiogram Chest With & Without Contrast [[id number]] Collected:  07/11/18 2026     Updated:  07/12/18 0840    Narrative:       EXAMINATION: CT ANGIOGRAM CHEST W/WO CONTRAST - 7/11/2018     INDICATION: Chest pain, shortness of air.     TECHNIQUE: Multiple axial CT imaging is obtained of the chest following  the administration of intravenous contrast according to the CT angiogram  protocol. 2D coronal reformatted images were submitted to further  facilitate diagnostic accuracy and treatment planning.      The radiation dose reduction device was turned on for each scan per the  ALARA (As Low as Reasonably Achievable) protocol.     COMPARISON: 4/23/2018.     FINDINGS: There is a pulmonary nodule seen within the right lower lobe  which is stable in size and appearance when compared to the prior study.  There is adenopathy identified within the right hilar region as well as  throughout the mediastinum which is stable and unchanged. The remainder  of the lung parenchyma reveals mild chronic and emphysematous change.  There is some calcification seen in the mediastinal and left hilar lymph  nodes suggesting old healed granulomatous disease. There are atelectatic  changes identified at the lung bases bilaterally. Cardiac chambers are  within normal limits. There is no filling defect in the pulmonary  arteries to suggest evidence of pulmonary embolism. The lymph nodes in  the left axillary region are stable. No mediastinal mass. Vascular  calcification is seen within the thoracic aorta. Degenerative change is  seen within the spine. There are areas of  sclerosis within the spine  concerning for bony metastatic disease. The visualized upper abdomen is  grossly unremarkable.       Impression:       Stable appearance of the pulmonary nodule as well as stable  hilar adenopathy on the right and mediastinal adenopathy. There is  stable bony metastatic disease throughout the bony skeleton. There is  also stable appearance of the left axillary region. No new focal  parenchymal opacification. No evidence of pulmonary embolism.      DICTATED:   711/2018  EDITED/ls :   7/11/2018      This report was finalized on 7/12/2018 8:38 AM by Dr. Valentine Dover MD.       XR Chest 1 View [[id number]] Collected:  07/11/18 1425     Updated:  07/11/18 1455    Narrative:       EXAMINATION: XR CHEST 1 VW-07/11/2018:      INDICATION: SOA.      COMPARISON: 04/24/2018.     FINDINGS: Portable chest reveals cardiac and mediastinal silhouettes to  be within normal limits. Underlying chronic and emphysematous changes  seen within the lung fields bilaterally. Calcified granuloma identified  at the left lung base.  No pleural effusion  or pneumothorax. Pulmonary  vascularity is within normal limits.           Impression:       No acute cardiopulmonary disease. Stable chronic changes  seen within the lung fields bilaterally.     D:  07/11/2018  E:  07/11/2018     This report was finalized on 7/11/2018 2:53 PM by Dr. Valentine Dover MD.           Impression: Breast cancer with bone metastasis  Dysphagia  Dyspnea  Goals of care  Plan: Patient states that her overall goals is to probably talk to oncology about continued to pursue chemotherapy, which is cooperated by oncology notes.    Apparently the patient has multiple allergies to medications especially opioids as well as having some contraindications to using adjuvant therapy such as ibuprofen.  With this in mind I want to follow alone and not make any changes for the time being.      Nadir Ojeda,   07/12/18  12:45 PM

## 2018-07-12 NOTE — PLAN OF CARE
Problem: Patient Care Overview  Goal: Interprofessional Rounds/Family Conf  Outcome: Ongoing (interventions implemented as appropriate)  Palliative Team Conference: LEVI Maldonado RN, CHPN; EUSEBIA Kim RN, CHPN; ARIEL Hoffman, RN, CHPN; GARETT Lock, APRN; BRIAN Ojeda,    07/12/18 1415   Interdisciplinary Rounds/Family Conf   Summary Palliative consulted for symptom management. No changes to pain regimen due to multiple severe allergies to opioids and related, NSAIDS. Pt reported new area of skin reaction to Echo gel; L breast and rib area with raised welt that itches and burns. Ice applied, RN notified for administration of prn Benadryl. When seen subsequently by Dr. Ojeda, pt denied pain or discomfort, stated dyspnea improved from yesterday after Lasix. Monitor pt and symptoms, engage complementary therapy for non-medication interventions for relief of pain, anxiety.       Problem: Palliative Care (Adult)  Intervention: Minimize Discomfort   07/12/18 1415   Promote Oxygenation/Perfusion   Pain Management Interventions pain management plan reviewed with patient/caregiver;see MAR;cold applied     Intervention: Optimize Function   07/12/18 1415   Musculoskeletal Interventions   Fatigue Management paced activity encouraged     Intervention: Promote Informed Decision Making and Goal Setting   07/12/18 1415   Coping/Psychosocial Interventions   Life Transition/Adjustment palliative care discussed;palliative care initiated;advance care planning facilitated  ( notified of request for assist with LW)     Intervention: Support/Optimize Psychosocial Response   07/12/18 1415   Coping/Psychosocial Interventions   Supportive Measures active listening utilized;decision-making supported;relaxation techniques promoted;self-care encouraged;self-reflection promoted   Psychosocial Support   Family/Support System Care support provided       Goal: Identify Related Risk Factors and Signs and Symptoms  Outcome: Ongoing (interventions  implemented as appropriate)   07/12/18 1415   Palliative Care (Adult)   Palliative Care: Related Risk Factors chronic illness;condition is progressive;terminal illness;worsening symptoms   Palliative Care: Signs and Symptoms anxiety;breathlessness;constipation;emotional distress;fatigue;pain     Goal: Maximized Comfort  Outcome: Ongoing (interventions implemented as appropriate)   07/12/18 1415   Palliative Care (Adult)   Maximized Comfort making progress toward outcome     Goal: Enhanced Quality of Life  Outcome: Ongoing (interventions implemented as appropriate)   07/12/18 1415   Palliative Care (Adult)   Enhanced Quality of Life making progress toward outcome

## 2018-07-12 NOTE — PROGRESS NOTES
"    [facility] Services  PROGRESS NOTE    Patient Name: Betsy Chi  : 1955  MRN: [id number]    Date of Admission: 2018  Length of Stay: 1  Primary Care Physician: PRAFUL Wheatley    Subjective   Subjective     CC:  Dyspnea, metastatic breast cancer    HPI:  Feeling better after diuresis, dyspnea resolved now at baseline, pain now to bsaseline. No bm today and \"always have to strain\" when moving bowels.    Review of Systems  No fever, no chills, no headache    Otherwise ROS is negative except as mentioned in the HPI.    Objective   Objective     Vital Signs:   Temp:  [97.3 °F (36.3 °C)-98.6 °F (37 °C)] 97.9 °F (36.6 °C)  Heart Rate:  [62-91] 81  Resp:  [15-18] 16  BP: ()/(54-70) 100/57        Physical Exam:  Mildly anxious affect, otherwise appropriate  Ox4, nontoxic appearing  Nasal canula in place, ncat  rrr  Lungs grossly clear, no overt wheezing, rhonchi, or rales currently  abd soft, mildly distended, minimal epigastric fullness but no overt tenderness  No cce currently   No erythema/no rash legs  Face symmetric, speech clear, equal gripos  Results Reviewed:  I have personally reviewed current lab, radiology, and data and agree.      Results from last 7 days  Lab Units 18  1308   WBC 10*3/mm3 7.35   HEMOGLOBIN g/dL 14.1   HEMATOCRIT % 42.7   PLATELETS 10*3/mm3 230       Results from last 7 days  Lab Units 18  1405 18  1308   SODIUM mmol/L  --  142   POTASSIUM mmol/L  --  4.6   CHLORIDE mmol/L  --  107   CO2 mmol/L  --  27.0   BUN mg/dL  --  15   CREATININE mg/dL  --  1.07   GLUCOSE mg/dL  --  102*   CALCIUM mg/dL  --  9.5   ALT (SGPT) U/L  --  28   AST (SGOT) U/L  --  24   TROPONIN I ng/mL <0.006 <0.006     Estimated Creatinine Clearance: 53.9 mL/min (by C-G formula based on SCr of 1.07 mg/dL).  BNP   Date Value Ref Range Status   2018 60.0 0.0 - 100.0 pg/mL Final     Comment:     Results may be falsely decreased if patient taking " Biotin.       Microbiology Results Abnormal     None          Imaging Results (last 24 hours)     Procedure Component Value Units Date/Time    CT Angiogram Chest With & Without Contrast [[id number]] Collected:  07/11/18 2026     Updated:  07/12/18 0840    Narrative:       EXAMINATION: CT ANGIOGRAM CHEST W/WO CONTRAST - 7/11/2018     INDICATION: Chest pain, shortness of air.     TECHNIQUE: Multiple axial CT imaging is obtained of the chest following  the administration of intravenous contrast according to the CT angiogram  protocol. 2D coronal reformatted images were submitted to further  facilitate diagnostic accuracy and treatment planning.      The radiation dose reduction device was turned on for each scan per the  ALARA (As Low as Reasonably Achievable) protocol.     COMPARISON: 4/23/2018.     FINDINGS: There is a pulmonary nodule seen within the right lower lobe  which is stable in size and appearance when compared to the prior study.  There is adenopathy identified within the right hilar region as well as  throughout the mediastinum which is stable and unchanged. The remainder  of the lung parenchyma reveals mild chronic and emphysematous change.  There is some calcification seen in the mediastinal and left hilar lymph  nodes suggesting old healed granulomatous disease. There are atelectatic  changes identified at the lung bases bilaterally. Cardiac chambers are  within normal limits. There is no filling defect in the pulmonary  arteries to suggest evidence of pulmonary embolism. The lymph nodes in  the left axillary region are stable. No mediastinal mass. Vascular  calcification is seen within the thoracic aorta. Degenerative change is  seen within the spine. There are areas of sclerosis within the spine  concerning for bony metastatic disease. The visualized upper abdomen is  grossly unremarkable.       Impression:       Stable appearance of the pulmonary nodule as well as stable  hilar adenopathy on the  right and mediastinal adenopathy. There is  stable bony metastatic disease throughout the bony skeleton. There is  also stable appearance of the left axillary region. No new focal  parenchymal opacification. No evidence of pulmonary embolism.      DICTATED:   711/2018  EDITED/ls :   7/11/2018      This report was finalized on 7/12/2018 8:38 AM by Dr. Valentine Dover MD.       XR Chest 1 View [[id number]] Collected:  07/11/18 1425     Updated:  07/11/18 1455    Narrative:       EXAMINATION: XR CHEST 1 VW-07/11/2018:      INDICATION: SOA.      COMPARISON: 04/24/2018.     FINDINGS: Portable chest reveals cardiac and mediastinal silhouettes to  be within normal limits. Underlying chronic and emphysematous changes  seen within the lung fields bilaterally. Calcified granuloma identified  at the left lung base.  No pleural effusion  or pneumothorax. Pulmonary  vascularity is within normal limits.           Impression:       No acute cardiopulmonary disease. Stable chronic changes  seen within the lung fields bilaterally.     D:  07/11/2018  E:  07/11/2018     This report was finalized on 7/11/2018 2:53 PM by Dr. Valentine Dover MD.           Results for orders placed during the hospital encounter of 07/11/18   Adult Transthoracic Echo Complete W/ Cont if Necessary Per Protocol    Narrative · Left ventricular systolic function is normal. Estimated EF = 65%.  · Left ventricular wall thickness is consistent with mild concentric   hypertrophy.  · Left ventricular diastolic dysfunction (grade II) consistent with   pseudonormalization.  · Estimated right ventricular systolic pressure from tricuspid   regurgitation is normal (<35 mmHg).          I have reviewed the medications.    Assessment/Plan   Assessment / Plan     Hospital Problem List     * (Principal)Acute on chronic hypoxemic respiratory failure not requiring ventilatory support    Chronic hypoxemic respiratory failure (CMS/HCC)    Overview Signed 7/12/2018  2:01  PM by Blade Taylor MD     2Lnc chronically         Dyspnea    Acute on chronic diastolic CHF (congestive heart failure) (CMS/HCC)    COPD (chronic obstructive pulmonary disease) (CMS/HCC)    Atypical chest pain    Mixed anxiety depressive disorder    Atrial fibrillation (CMS/HCC)    Type 2 diabetes mellitus (CMS/HCC)    Dyslipidemia    Hypertension    Pulmonary nodule right lower lobe medially (2 x 1.5 cm).    Overview Signed 5/22/2017  1:30 PM by Janine Mckeon PA-C     Description: A.  CT scan of the chest February 11 thousand 16 reports a noncalcified 8.5-9 mm nodule in the medial portion of the right lower lobe.         Rheumatoid arthritis (CMS/HCC)    Invasive ductal carcinoma of breast, left (CMS/HCC)    Constipation             Brief Hospital Course to date:  Betsy Chi is a 62 y.o. female w/ metastatic breast cancer, copd, chronic 2Lnc use, ? Hx afib, dm2 ra presented as direct admisison from dr. centeno office for multiple complaints including diffuse pain, dyspnea.      Assessment & Plan:  -echo normal ef, diastolic dysfunction  -ct angio no pulm embolism  -cards & heme-onc & palliative following (no ischemic evaluation, treat medically)  -continue prn diuresis (improved respiratory status after lasix0  -bowel regimen (miralax bid, senokot bid, prn dulcolax) as constipation likely causing some discomfort  -continue 2Lnc (on at home, per patient)  -steroid cream to rash low back  -add steroid if wheezing develops  -add protonix, dysphagia eval if dysphagia    -bmp, mag in a.m.    *home once symptoms controlled and ok w/ dr. Centeno, possibly tomorrow (admitted from her office)    DVT Prophylaxis:  Sq heparin    CODE STATUS:   Code Status and Medical Interventions:   Ordered at: 07/11/18 1254     Level Of Support Discussed With:    Patient     Code Status:    CPR     Medical Interventions (Level of Support Prior to Arrest):    Full       Disposition: I expect the patient to be discharged        Electronically signed by Blade Taylor MD, 07/12/18, 2:06 PM.

## 2018-07-12 NOTE — PLAN OF CARE
Problem: Patient Care Overview  Goal: Plan of Care Review  Outcome: Ongoing (interventions implemented as appropriate)   07/12/18 0340   Coping/Psychosocial   Plan of Care Reviewed With patient   Plan of Care Review   Progress improving   OTHER   Outcome Summary No acute overnight events. VSS. Continue to monitor.       Problem: Fall Risk (Adult)  Goal: Identify Related Risk Factors and Signs and Symptoms  Outcome: Ongoing (interventions implemented as appropriate)    Goal: Absence of Fall  Outcome: Ongoing (interventions implemented as appropriate)      Problem: ARDS (Acute Resp Distress Syndrome) (Adult)  Goal: Signs and Symptoms of Listed Potential Problems Will be Absent, Minimized or Managed (ARDS)  Outcome: Ongoing (interventions implemented as appropriate)

## 2018-07-12 NOTE — CONSULTS
Subjective     PROBLEM LIST:  1. [id number] invasive ductal carcinoma of the left breast, ER+, SC+, Her2 negative  A) presented with a palpable mass in the left breast as well as skin thickening.  biopsy showed multifocal low grade IDC.  Skin biopsy negative.  B) PET/CT 6/6/18 showed widespread bony metastatic disease  2. History of right breast cancer 2001, treated with lumpectomy and SLNB, ? CMF, and adjuvant hormonal therapy  3. Anxiety/depression  4. Rheumatoid arthritis  5. Asthma  6. CHF  7. COPD  8. Diabetes mellitus type 2  9. GERD  10. HTN  11. HL  12. CVA  13. Tobacco abuse  14. choriocarcinoma  15. Fibromyalgia  16. Atrial fibrillation       CHIEF COMPLAINT: shortness of breath, pain    HISTORY OF PRESENT ILLNESS:  The patient is a 62 y.o. female with recently diagnosed metastatic breast cancer, admitted from clinic yesterday with uncontrolled pain, dyspnea, abdominal pain, and vomiting.    She feels somewhat better today after diuresis.    Breathing is improved.  Still having some trouble swallowing and keeping food down.  Still concerned about sharp abdominal pain she has when bending over.  Reports having daily small bowel movements.    REVIEW OF SYSTEMS:  A 14 point review of systems was performed and is negative except as noted above.    Past Medical History:   Diagnosis Date   • Arthritis    • Asthma    • Atrial fibrillation (CMS/HCC)    • Breast cancer (CMS/HCC)    • CHF (congestive heart failure) (CMS/HCC)    • Chronic kidney disease (CKD)    • COPD (chronic obstructive pulmonary disease) (CMS/HCC)    • CVA (cerebral vascular accident) (CMS/HCC)    • Diabetes mellitus, type 2 (CMS/HCC)    • GERD (gastroesophageal reflux disease)    • Hypercholesteremia    • Hypertension    • Myocardial infarction    • Osteoarthritis    • Psoriatic arthritis (CMS/HCC)    • Pulmonary embolism (CMS/HCC)    • Rheumatoid arthritis (CMS/HCC)    • Sciatica    • Smoker    • UTI (urinary tract infection)        No current  facility-administered medications on file prior to encounter.      Current Outpatient Prescriptions on File Prior to Encounter   Medication Sig Dispense Refill   • albuterol (PROVENTIL HFA;VENTOLIN HFA) 108 (90 Base) MCG/ACT inhaler Inhale 2 puffs Every 4 (Four) Hours As Needed for Wheezing. 1 inhaler 2   • aluminum acetate (DOMEBORO) pack packet Apply  topically 3 (Three) Times a Day As Needed (psoriatic arthritis). 100 each 6   • amLODIPine (NORVASC) 10 MG tablet TAKE ONE TABLET BY MOUTH DAILY 30 tablet 0   • atorvastatin (LIPITOR) 20 MG tablet Take 1 tablet by mouth Every Night. 90 tablet 0   • carvedilol (COREG) 12.5 MG tablet TAKE ONE TABLET BY MOUTH TWICE A DAY WITH MEALS 60 tablet 0   • Cranberry 600 MG tablet Take 1 tablet by mouth Daily.     • glucose blood test strip Use as instructed 100 each 12   • glucose monitor monitoring kit 1 each 3 (Three) Times a Day As Needed (hypo/hyperglycemia). 1 each 3   • Lancets (ACCU-CHEK SOFT TOUCH) lancets Three times daily and as needed 100 each 12   • letrozole (FEMARA) 2.5 MG tablet Take 1 tablet by mouth Daily. 30 tablet 11   • ondansetron (ZOFRAN) 8 MG tablet Take 1 tablet by mouth 3 (Three) Times a Day As Needed for Nausea or Vomiting. 30 tablet 5   • pantoprazole (PROTONIX) 40 MG EC tablet Take 1 tablet by mouth Daily. 90 tablet 2       Allergies   Allergen Reactions   • Ampicillin Anaphylaxis   • Erythromycin Anaphylaxis   • Morphine And Related Anaphylaxis   • Oxycodone-Acetaminophen Anaphylaxis   • Oxycodone-Aspirin Anaphylaxis   • Penicillins Anaphylaxis   • Sulfa Antibiotics Swelling and Rash   • Aspirin GI Bleeding   • Codeine Swelling   • Contrast Dye Swelling   • Ibuprofen GI Bleeding   • Latex Arrhythmia   • Propoxyphene Nausea And Vomiting   • Saccharin Nausea And Vomiting   • Tramadol Swelling       Past Surgical History:   Procedure Laterality Date   • BLADDER SURGERY     • BREAST BIOPSY     • BREAST LUMPECTOMY     • BRONCHOSCOPY N/A 5/9/2018     Procedure: BRONCHOSCOPY WITH ENDOBRONCHIAL ULTRASOUND AND NAVIGATION WITH FLUORO;  Surgeon: Nixon Mulligan MD;  Location: [facility] ENDOSCOPY;  Service: Pulmonary   • HYSTERECTOMY     • OOPHORECTOMY     • TUMOR REMOVAL      fallopian tube   • US GUIDED FINE NEEDLE ASPIRATION  5/3/2018           Social History     Social History   • Marital status: Legally    • Number of children: 3     Occupational History   • caretaker      retired     Social History Main Topics   • Smoking status: Light Tobacco Smoker     Packs/day: 0.25     Years: 46.00     Types: Cigarettes   • Smokeless tobacco: Never Used      Comment: has attempted to quit this week   • Alcohol use No   • Drug use: No   • Sexual activity: Defer     Other Topics Concern   • Not on file     Social History Narrative    The patient lives with her niece in [place].    Caffeine: sometimes a pot of coffee and sometimes none.           Family History   Problem Relation Age of Onset   • Breast cancer Cousin         Maternal cousins x 3 with breast cancer   • Cancer Mother         Uterine, colon   • Aneurysm Mother    • Cancer Father         Thyroid   • Aneurysm Father        Objective     Vitals:    07/11/18 2305 07/12/18 0241 07/12/18 0355 07/12/18 0714   BP:   109/60    BP Location:   Right arm    Patient Position:   Lying    Pulse: 72 71 62 72   Resp: 17 16 16 16   Temp:   97.7 °F (36.5 °C)    TempSrc:   Oral    SpO2: 92% 93%  94%   Weight:       Height:              General:  female in no acute distress  Neuro: alert and oriented  HEENT: sclera anicteric, oropharynx clear  Lymphatics: no cervical, supraclavicular, or axillary adenopathy  Cardiovascular: regular rate and rhythm, no murmurs  Lungs: clear to auscultation bilaterally  Breast: The left breast is swollen, with erythema and thickening of the skin.   Abdomen: soft, nontender, nondistended.  No palpable organomegaly  Extremeties:1+ bilateral LE edema  Skin: no rashes, lesions, bruising, or  petechiae  Psych: tearful      Imaging:  CT chest shows no evidence of PE.  Pulmonary nodule, axillary disease, and bony metastases stable as well.      Assessment/Plan     Betsy Chi is a 62 y.o. year old female with metastatic breast cancer, admitted for pain management, dyspnea, evaluation of abdominal pain/vomiting.    Metastatic breast cancer: continue letrozole.  Will plan to add ibrance once GI issues have improved.  Give dose of zometa today.    Abdominal pain/vomiting: etiology unclear.  Reviewed CT with radiology - no significant abdominal distention or stool.  Continue PPI.   Consider swallow eval.    Pain: palliative care consult.  She would benefit from outpatient follow up with palliative care as well.  We can consider radiation if she doesn't have improvement with systemic therapy.           Mary Mendoza MD    7/12/2018

## 2018-07-13 ENCOUNTER — ANCILLARY PROCEDURE (OUTPATIENT)
Dept: SPEECH THERAPY | Facility: HOSPITAL | Age: 63
End: 2018-07-13
Attending: INTERNAL MEDICINE

## 2018-07-13 LAB
ANION GAP SERPL CALCULATED.3IONS-SCNC: 10 MMOL/L (ref 3–11)
BACTERIA UR QL AUTO: ABNORMAL /HPF
BILIRUB UR QL STRIP: NEGATIVE
BUN BLD-MCNC: 18 MG/DL (ref 9–23)
BUN/CREAT SERPL: 14.3 (ref 7–25)
CALCIUM SPEC-SCNC: 8.7 MG/DL (ref 8.7–10.4)
CHLORIDE SERPL-SCNC: 105 MMOL/L (ref 99–109)
CLARITY UR: ABNORMAL
CO2 SERPL-SCNC: 25 MMOL/L (ref 20–31)
COLOR UR: YELLOW
CREAT BLD-MCNC: 1.26 MG/DL (ref 0.6–1.3)
GFR SERPL CREATININE-BSD FRML MDRD: 43 ML/MIN/1.73
GLUCOSE BLD-MCNC: 96 MG/DL (ref 70–100)
GLUCOSE BLDC GLUCOMTR-MCNC: 110 MG/DL (ref 70–130)
GLUCOSE BLDC GLUCOMTR-MCNC: 113 MG/DL (ref 70–130)
GLUCOSE BLDC GLUCOMTR-MCNC: 94 MG/DL (ref 70–130)
GLUCOSE BLDC GLUCOMTR-MCNC: 95 MG/DL (ref 70–130)
GLUCOSE UR STRIP-MCNC: NEGATIVE MG/DL
HGB UR QL STRIP.AUTO: ABNORMAL
HYALINE CASTS UR QL AUTO: ABNORMAL /LPF
KETONES UR QL STRIP: NEGATIVE
LEUKOCYTE ESTERASE UR QL STRIP.AUTO: ABNORMAL
MAGNESIUM SERPL-MCNC: 2.3 MG/DL (ref 1.3–2.7)
NITRITE UR QL STRIP: NEGATIVE
PH UR STRIP.AUTO: 7 [PH] (ref 5–8)
POTASSIUM BLD-SCNC: 4.2 MMOL/L (ref 3.5–5.5)
PROT UR QL STRIP: NEGATIVE
RBC # UR: ABNORMAL /HPF
REF LAB TEST METHOD: ABNORMAL
SODIUM BLD-SCNC: 140 MMOL/L (ref 132–146)
SP GR UR STRIP: 1.01 (ref 1–1.03)
SQUAMOUS #/AREA URNS HPF: ABNORMAL /HPF
UROBILINOGEN UR QL STRIP: ABNORMAL
WBC UR QL AUTO: ABNORMAL /HPF

## 2018-07-13 PROCEDURE — 83735 ASSAY OF MAGNESIUM: CPT | Performed by: INTERNAL MEDICINE

## 2018-07-13 PROCEDURE — 99232 SBSQ HOSP IP/OBS MODERATE 35: CPT | Performed by: INTERNAL MEDICINE

## 2018-07-13 PROCEDURE — 99231 SBSQ HOSP IP/OBS SF/LOW 25: CPT | Performed by: INTERNAL MEDICINE

## 2018-07-13 PROCEDURE — 81001 URINALYSIS AUTO W/SCOPE: CPT | Performed by: INTERNAL MEDICINE

## 2018-07-13 PROCEDURE — 87086 URINE CULTURE/COLONY COUNT: CPT | Performed by: INTERNAL MEDICINE

## 2018-07-13 PROCEDURE — 87077 CULTURE AEROBIC IDENTIFY: CPT | Performed by: INTERNAL MEDICINE

## 2018-07-13 PROCEDURE — 80048 BASIC METABOLIC PNL TOTAL CA: CPT | Performed by: INTERNAL MEDICINE

## 2018-07-13 PROCEDURE — 94640 AIRWAY INHALATION TREATMENT: CPT

## 2018-07-13 PROCEDURE — 94799 UNLISTED PULMONARY SVC/PX: CPT

## 2018-07-13 PROCEDURE — 92610 EVALUATE SWALLOWING FUNCTION: CPT

## 2018-07-13 PROCEDURE — 25010000002 DIPHENHYDRAMINE PER 50 MG: Performed by: INTERNAL MEDICINE

## 2018-07-13 PROCEDURE — 25010000002 HEPARIN (PORCINE) PER 1000 UNITS: Performed by: FAMILY MEDICINE

## 2018-07-13 PROCEDURE — 82962 GLUCOSE BLOOD TEST: CPT

## 2018-07-13 PROCEDURE — 87186 SC STD MICRODIL/AGAR DIL: CPT | Performed by: INTERNAL MEDICINE

## 2018-07-13 PROCEDURE — 99233 SBSQ HOSP IP/OBS HIGH 50: CPT | Performed by: INTERNAL MEDICINE

## 2018-07-13 PROCEDURE — 92612 ENDOSCOPY SWALLOW (FEES) VID: CPT

## 2018-07-13 RX ORDER — ACETAMINOPHEN 325 MG/1
650 TABLET ORAL EVERY 6 HOURS PRN
Status: DISCONTINUED | OUTPATIENT
Start: 2018-07-13 | End: 2018-07-24 | Stop reason: HOSPADM

## 2018-07-13 RX ORDER — FENTANYL CITRATE 50 UG/ML
25 INJECTION, SOLUTION INTRAMUSCULAR; INTRAVENOUS
Status: DISCONTINUED | OUTPATIENT
Start: 2018-07-13 | End: 2018-07-15

## 2018-07-13 RX ORDER — SODIUM CHLORIDE 9 MG/ML
75 INJECTION, SOLUTION INTRAVENOUS CONTINUOUS
Status: DISCONTINUED | OUTPATIENT
Start: 2018-07-13 | End: 2018-07-15

## 2018-07-13 RX ADMIN — IPRATROPIUM BROMIDE AND ALBUTEROL SULFATE 3 ML: 2.5; .5 SOLUTION RESPIRATORY (INHALATION) at 07:45

## 2018-07-13 RX ADMIN — SENNOSIDES AND DOCUSATE SODIUM 2 TABLET: 8.6; 5 TABLET ORAL at 08:15

## 2018-07-13 RX ADMIN — LETROZOLE 2.5 MG: 2.5 TABLET, FILM COATED ORAL at 08:15

## 2018-07-13 RX ADMIN — LORAZEPAM 0.5 MG: 0.5 TABLET ORAL at 14:07

## 2018-07-13 RX ADMIN — IPRATROPIUM BROMIDE AND ALBUTEROL SULFATE 3 ML: 2.5; .5 SOLUTION RESPIRATORY (INHALATION) at 04:12

## 2018-07-13 RX ADMIN — LORAZEPAM 0.5 MG: 0.5 TABLET ORAL at 00:23

## 2018-07-13 RX ADMIN — SODIUM CHLORIDE 75 ML/HR: 9 INJECTION, SOLUTION INTRAVENOUS at 11:22

## 2018-07-13 RX ADMIN — CARVEDILOL 3.12 MG: 3.12 TABLET, FILM COATED ORAL at 18:59

## 2018-07-13 RX ADMIN — IPRATROPIUM BROMIDE AND ALBUTEROL SULFATE 3 ML: 2.5; .5 SOLUTION RESPIRATORY (INHALATION) at 16:11

## 2018-07-13 RX ADMIN — ATORVASTATIN CALCIUM 20 MG: 20 TABLET, FILM COATED ORAL at 21:14

## 2018-07-13 RX ADMIN — PANTOPRAZOLE SODIUM 40 MG: 40 TABLET, DELAYED RELEASE ORAL at 06:02

## 2018-07-13 RX ADMIN — ATORVASTATIN CALCIUM 20 MG: 20 TABLET, FILM COATED ORAL at 00:19

## 2018-07-13 RX ADMIN — DIPHENHYDRAMINE HYDROCHLORIDE 25 MG: 50 INJECTION INTRAMUSCULAR; INTRAVENOUS at 08:24

## 2018-07-13 RX ADMIN — HEPARIN SODIUM 5000 UNITS: 5000 INJECTION, SOLUTION INTRAVENOUS; SUBCUTANEOUS at 21:14

## 2018-07-13 RX ADMIN — TRIAMCINOLONE ACETONIDE: 1 CREAM TOPICAL at 08:15

## 2018-07-13 RX ADMIN — SENNOSIDES AND DOCUSATE SODIUM 2 TABLET: 8.6; 5 TABLET ORAL at 00:19

## 2018-07-13 RX ADMIN — TRIAMCINOLONE ACETONIDE: 1 CREAM TOPICAL at 21:15

## 2018-07-13 RX ADMIN — HEPARIN SODIUM 5000 UNITS: 5000 INJECTION, SOLUTION INTRAVENOUS; SUBCUTANEOUS at 06:02

## 2018-07-13 RX ADMIN — TRIAMCINOLONE ACETONIDE: 1 CREAM TOPICAL at 00:23

## 2018-07-13 RX ADMIN — IPRATROPIUM BROMIDE AND ALBUTEROL SULFATE 3 ML: 2.5; .5 SOLUTION RESPIRATORY (INHALATION) at 12:17

## 2018-07-13 RX ADMIN — IPRATROPIUM BROMIDE AND ALBUTEROL SULFATE 3 ML: 2.5; .5 SOLUTION RESPIRATORY (INHALATION) at 19:49

## 2018-07-13 RX ADMIN — HEPARIN SODIUM 5000 UNITS: 5000 INJECTION, SOLUTION INTRAVENOUS; SUBCUTANEOUS at 00:19

## 2018-07-13 RX ADMIN — CARVEDILOL 3.12 MG: 3.12 TABLET, FILM COATED ORAL at 08:15

## 2018-07-13 NOTE — PLAN OF CARE
Problem: Patient Care Overview  Goal: Plan of Care Review  Outcome: Ongoing (interventions implemented as appropriate)   07/13/18 4119   Coping/Psychosocial   Plan of Care Reviewed With patient   Plan of Care Review   Progress no change   SLP BS & FEES evaluations completed. Will sign-off as pt presents w/ functional oropharyngeal swallow. No aspiration w/ any consistency. 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 This is typically asymptomatic, but can occasionally can cause pain when swallowing or dysphagia. Pt also reports swelling in lymph nodes of neck, which is more likely contributing to odynophagia. Though cannot exclude the observed mass as a contributing factor to pt's complaints. RECS: Ok for Regular diet, thin liquids, meds ok whole in thins, general aspiration/reflux precautions, oral care BID and PRN, No further SLP dysphagia needs at this time. Please see note for further details and recommendations.

## 2018-07-13 NOTE — MBS/VFSS/FEES
Acute Care - Speech Language Pathology   Swallow Initial Evaluation [facility]   Fiberoptic Endoscopic Evaluation of Swallowing (FEES)  Clinical Swallow Evaluation     Patient Name: Betsy Chi  : 1955  MRN: [id number]  Today's Date: 2018               Admit Date: 2018    Visit Dx:     ICD-10-CM ICD-9-CM   1. Dysphagia, unspecified type R13.10 787.20     Patient Active Problem List   Diagnosis   • Mixed anxiety depressive disorder   • Asthma   • Coronary arteriosclerosis in native artery   • Atrial fibrillation (CMS/HCC)   • Biliary dyskinesia   • History of Cerebral infarction (CT scan of brain this hospitalization normal)   • Chronic bronchitis (CMS/HCC)   • Chronic low back pain   • COPD (chronic obstructive pulmonary disease) (CMS/HCC)   • Chronic urinary tract infection   • Hepatic cirrhosis (CMS/HCC)   • Type 2 diabetes mellitus (CMS/HCC)   • Dyslipidemia   • Fibromyalgia   • Gastroesophageal reflux disease without esophagitis   • Hypertension   • Disorder of kidney   • Disorder of endocrine system   • Osteoarthritis   • Osteoporosis   • Pulmonary nodule right lower lobe medially (2 x 1.5 cm).   • Rheumatoid arthritis (CMS/HCC)   • Seasonal allergic rhinitis   • Biliary sludge   • Peptic ulcer of stomach   • Acute on chronic hypoxemic respiratory failure not requiring ventilatory support   • Invasive ductal carcinoma of breast, left (CMS/HCC)   • Bone metastases (CMS/HCC)   • Open angle with borderline findings and low glaucoma risk in both eyes   • Palpitations   • Chronic hypoxemic respiratory failure (CMS/HCC)   • Dyspnea   • Atypical chest pain   • Acute on chronic diastolic CHF (congestive heart failure) (CMS/HCC)   • Constipation     Past Medical History:   Diagnosis Date   • Arthritis    • Asthma    • Atrial fibrillation (CMS/HCC)    • Breast cancer (CMS/HCC)    • CHF (congestive heart failure) (CMS/HCC)    • Chronic kidney disease (CKD)    • COPD (chronic obstructive pulmonary  disease) (CMS/HCC)    • CVA (cerebral vascular accident) (CMS/HCC)    • Diabetes mellitus, type 2 (CMS/HCC)    • GERD (gastroesophageal reflux disease)    • Hypercholesteremia    • Hypertension    • Myocardial infarction    • Osteoarthritis    • Psoriatic arthritis (CMS/HCC)    • Pulmonary embolism (CMS/HCC)    • Rheumatoid arthritis (CMS/HCC)    • Sciatica    • Smoker    • UTI (urinary tract infection)      Past Surgical History:   Procedure Laterality Date   • BLADDER SURGERY     • BREAST BIOPSY     • BREAST LUMPECTOMY     • BRONCHOSCOPY N/A 5/9/2018    Procedure: BRONCHOSCOPY WITH ENDOBRONCHIAL ULTRASOUND AND NAVIGATION WITH FLUORO;  Surgeon: Nixon Mulligan MD;  Location[facility] ENDOSCOPY;  Service: Pulmonary   • HYSTERECTOMY     • OOPHORECTOMY     • TUMOR REMOVAL      fallopian tube   • US GUIDED FINE NEEDLE ASPIRATION  5/3/2018          SWALLOW EVALUATION (last 72 hours)      SLP Adult Swallow Evaluation     Row Name 07/13/18 0945       Rehab Evaluation    Document Type evaluation  -RD    Subjective Information complains of;pain  -RD    Patient Observations alert;cooperative;agree to therapy  -RD    Patient/Family Observations No family present  -RD    Patient Effort good  -RD       General Information    Patient Profile Reviewed yes  -RD    Pertinent History Of Current Problem Adm w/ a/c resp failure. Brast cancer, RLL pulmonary nodule. C/o vomiting foods and meds. Choriocarcinoma. Hx of afib, COPD, DM2, CHF, GERD. Pt reports hx of dysphagia after prior CVA that resolved after ST. Pt now complaining of pain when swallowing and food sticking in throat.   -RD    Current Method of Nutrition regular textures;thin liquids  -RD    Precautions/Limitations, Vision WFL;for purposes of eval  -RD    Precautions/Limitations, Hearing WFL;for purposes of eval  -RD    Prior Level of Function-Communication WFL  -RD    Prior Level of Function-Swallowing no diet consistency restrictions  -RD    Plans/Goals Discussed with  "patient;agreed upon  -RD    Barriers to Rehab medically complex  -RD    Patient's Goals for Discharge eat/drink without coughing/choking  -RD       Pain Assessment    Additional Documentation Pain Scale: Numbers Pre/Post-Treatment (Group)  -RD       Pain Scale: Numbers Pre/Post-Treatment    Pain Scale: Numbers, Pretreatment 3/10  -RD    Pain Scale: Numbers, Post-Treatment 3/10  -RD       Oral Motor and Function    Dentition Assessment natural, present and adequate  -RD    Secretion Management WNL/WFL  -RD    Mucosal Quality moist, healthy  -RD    Volitional Swallow weak  -RD    Volitional Cough WFL  -RD       Oral Musculature and Cranial Nerve Assessment    Oral Motor General Assessment WFL  -RD       General Eating/Swallowing Observations    Respiratory Support Currently in Use nasal cannula  -RD    Eating/Swallowing Skills fed by SLP;self-fed;appropriate self-feeding skills observed  -RD    Positioning During Eating upright 90 degree;upright in bed  -RD    Utensils Used spoon;cup  -RD    Consistencies Trialed thin liquids;pudding thick;regular textures  -RD       Clinical Swallow Eval    Oral Prep Phase WFL  -RD    Oral Transit WFL  -RD    Oral Residue WFL  -RD    Pharyngeal Phase suspected pharyngeal impairment  -RD    Esophageal Phase suspected esophageal impairment  -RD    Clinical Swallow Evaluation Summary BS eval complete. Overt s/s of aspiration c/b multiple swallows w/ all consistencies. Pt c/o food sticking in \"pocket in throat\" as well as odynophagia. Need to r/o pharyngeal dysphagia. FEES today.   -RD       Pharyngeal Phase Concerns    Pharyngeal Phase Concerns multiple swallows  -RD    Multiple Swallows thin;pudding;regular consistencies  -RD       Esophageal Phase Concerns    Esophageal Phase Concerns sensation of material sticking  -RD    Sensation of Material Sticking all consistencies  -RD       Fiberoptic Endoscopic Evaluation of Swallowing (FEES)    Risks/Benefits Reviewed risks/benefits " explained;patient;agreed to eval  -RD    Nasal Entry right:  -RD       Anatomy and Physiology    Anatomic Considerations anatomic deviation observed (see comments)  -RD    Comment Pt noted w/ protruding submucosal mass that is likely 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 This is typically asymptomatic, but can occasionally can cause pain when swallowing or dysphagia. Suspect this could be cause of pt's c/o odynophagia vs. swelling in lymph nodes of neck.   -RD    Velopharyngeal WFL  -RD    Base of Tongue symmetrical  -RD    Epiglottis WFL  -RD    Laryngeal Function Breathing symmetrical  -RD    Laryngeal Function Phonation symmetrical  -RD    Laryngeal Function to Breath Hold TVT/FVF/Arytenoid  -RD    Secretion Rating Scale (Vik et al. 1996) 0- normal, no visible secretions  -RD    Secretion Description thin;clear  -RD    Ice Chips DNA  -RD    Spontaneous Swallow frequency adequate  -RD    Sensory sensed scope  -RD    Utensils Used Spoon;Cup;Straw  -RD    Consistencies Trialed thin liquids;pudding/puree;Regular textures;Spoon;cup;straw  -RD       FEES Interpretation    Oral Phase WFL  -RD       Initiation of Pharyngeal Swallow    Initiation of Pharyngeal Swallow bolus in pyriform sinuses;other (see comments)   w/ thins only, but functional for pt's age  -RD    Pharyngeal Phase functional pharyngeal phase of swallow  -RD    Rosenbek's Scale thin:;1-->Level 1  -RD    FEES Summary Pt presents w/ functional oropharyngeal swallow. No penetration/aspiration w/ any trial or consistency. No significant pharyngeal residue noted. 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 This is typically asymptomatic, but can occasionally can cause pain when swallowing or dysphagia. Suspect this could be cause of pt's c/o odynophagia vs. swelling in lymph nodes of neck. Pt is safe for Regular and thins, no further SLP needs at this  time.   -RD       Clinical Impression    SLP Swallowing Diagnosis functional oral phase;functional pharyngeal phase  -RD    Functional Impact no impact on function  -RD    Rehab Potential/Prognosis, Swallowing good, to achieve stated therapy goals  -RD    Criteria for Skilled Therapeutic Interventions Met no problems identified which require skilled intervention;baseline status  -RD       Recommendations    Therapy Frequency (Swallow) evaluation only  -RD    SLP Diet Recommendation regular textures;thin liquids  -RD    Recommended Diagnostics FEES  -RD    Recommended Precautions and Strategies upright posture during/after eating;other (see comments)   general aspiration/reflux precautions, Oral care BID/PRN  -RD    SLP Rec. for Method of Medication Administration meds whole;with thin liquids;with pudding or applesauce;as tolerated  -RD    Anticipated Dischage Disposition unknown  -RD      User Key  (r) = Recorded By, (t) = Taken By, (c) = Cosigned By    Initials Name Effective Dates    JOCELYNE Cruz, MS CCC-SLP 04/03/18 -         EDUCATION  The patient has been educated in the following areas:   Dysphagia (Swallowing Impairment) Oral Care/Hydration Modified Diet Instruction.    SLP Recommendation and Plan  SLP Swallowing Diagnosis: functional oral phase, functional pharyngeal phase  SLP Diet Recommendation: regular textures, thin liquids  Recommended Precautions and Strategies: upright posture during/after eating, other (see comments) (general aspiration/reflux precautions, Oral care BID/PRN)        Recommended Diagnostics: FEES  Criteria for Skilled Therapeutic Interventions Met: no problems identified which require skilled intervention, baseline status  Anticipated Dischage Disposition: unknown  Rehab Potential/Prognosis, Swallowing: good, to achieve stated therapy goals  Therapy Frequency (Swallow): evaluation only          Plan of Care Reviewed With: patient  Plan of Care Review  Plan of Care Reviewed  With: patient  Progress: no change             Time Calculation:         Time Calculation- SLP     Row Name 07/13/18 1400             Time Calculation- SLP    SLP Start Time 0945  -RD      SLP Received On 07/13/18  -RD        User Key  (r) = Recorded By, (t) = Taken By, (c) = Cosigned By    Initials Name Provider Type    JOCELYNE Cruz MS CCC-SLP Speech and Language Pathologist          Therapy Charges for Today     Code Description Service Date Service Provider Modifiers Qty    82609970044 HC ST EVAL ORAL PHARYNG SWALLOW 4 7/13/2018 Maryanne Cruz MS CCC-SLP GN 1    28400402748 HC ST FIBEROPTIC ENDO EVAL SWALL 7 7/13/2018 Maryanne Cruz MS CCC-SLP GN 1               MS PADILLA Vásquez  7/13/2018

## 2018-07-13 NOTE — PLAN OF CARE
Problem: Patient Care Overview  Goal: Interprofessional Rounds/Family Conf  Outcome: Ongoing (interventions implemented as appropriate)  Palliative Team Conference: LEVI Maldonado RN, CHPN; EUSEBIA Kim RN, CHPN; ARIEL Hoffman RN, CHPN; BRIAN Ojeda, ; GARETT Mcfarland, PRAFUL   07/13/18 1510   Interdisciplinary Rounds/Family Conf   Summary Trial of Fentanyl available prn for pain; encouraged pt to try medication while in hospital due to multiple medication allergies. Monitor through weekend, if pt willing to try the Fentanyl, attempt transition to home-appropriate regimen.       Problem: Palliative Care (Adult)  Intervention: Minimize Discomfort   07/13/18 1510   Promote Oxygenation/Perfusion   Pain Management Interventions pain management plan reviewed with patient/caregiver;other (see comments)  (Trial Fentanyl for pain due to multiple opioid allergies)   Coping Strategies   Complementary Therapy other (see comments)  (integrative care consult in place)     Intervention: Optimize Function   07/13/18 1510   Musculoskeletal Interventions   Fatigue Management paced activity encouraged     Intervention: Promote Informed Decision Making and Goal Setting   07/13/18 1510   Coping/Psychosocial Interventions   Life Transition/Adjustment advance care planning facilitated     Intervention: Support/Optimize Psychosocial Response   07/13/18 1510   Coping/Psychosocial Interventions   Supportive Measures active listening utilized;decision-making supported;self-care encouraged;self-reflection promoted;self-responsibility promoted       Goal: Maximized Comfort  Outcome: Ongoing (interventions implemented as appropriate)   07/13/18 1510   Palliative Care (Adult)   Maximized Comfort making progress toward outcome     Goal: Enhanced Quality of Life  Outcome: Ongoing (interventions implemented as appropriate)   07/13/18 1510   Palliative Care (Adult)   Enhanced Quality of Life making progress toward outcome

## 2018-07-13 NOTE — PROGRESS NOTES
[facility] Medicine Services  PROGRESS NOTE    Patient Name: Betsy Chi  : 1955  MRN: [id number]    Date of Admission: 2018  Length of Stay: 2  Primary Care Physician: PRAFUL Wheatley    Subjective   Subjective     CC:abd pain, n/v    HPI:Patient continues to have some n/v and dysphagia does also endorse some dysuria    Review of Systems  Gen- No fevers, chills  CV- No chest pain, palpitations  Resp- No cough, dyspnea  GI- No diarrhea, +abd pain, +nausea/vomiting    Otherwise ROS is negative except as mentioned in the HPI.    Objective   Objective     Vital Signs:   Temp:  [97.9 °F (36.6 °C)-98.8 °F (37.1 °C)] 97.9 °F (36.6 °C)  Heart Rate:  [] 92  Resp:  [16-18] 18  BP: ()/(57-99) 134/67        Physical Exam:  Constitutional: No acute distress, awake, alert, uncomfortable  HENT: NCAT, mucous membranes moist  Respiratory: Clear to auscultation bilaterally, respiratory effort normal   Cardiovascular: RRR, no murmurs, rubs, or gallops, palpable pedal pulses bilaterally  Gastrointestinal: Positive bowel sounds, soft, nontender, nondistended  Musculoskeletal: No bilateral ankle edema  Psychiatric: Appropriate affect, cooperative  Neurologic: Oriented x 3, strength symmetric in all extremities, Cranial Nerves grossly intact to confrontation, speech clear  Skin: No rashes    Results Reviewed:  I have personally reviewed current lab, radiology, and data and agree.      Results from last 7 days  Lab Units 18  1308   WBC 10*3/mm3 7.35   HEMOGLOBIN g/dL 14.1   HEMATOCRIT % 42.7   PLATELETS 10*3/mm3 230       Results from last 7 days  Lab Units 18  0632 18  1405 18  1308   SODIUM mmol/L 140  --  142   POTASSIUM mmol/L 4.2  --  4.6   CHLORIDE mmol/L 105  --  107   CO2 mmol/L 25.0  --  27.0   BUN mg/dL 18  --  15   CREATININE mg/dL 1.26  --  1.07   GLUCOSE mg/dL 96  --  102*   CALCIUM mg/dL 8.7  --  9.5   ALT (SGPT) U/L  --   --  28   AST (SGOT)  U/L  --   --  24   TROPONIN I ng/mL  --  <0.006 <0.006     Estimated Creatinine Clearance: 45.8 mL/min (by C-G formula based on SCr of 1.26 mg/dL).  BNP   Date Value Ref Range Status   07/11/2018 60.0 0.0 - 100.0 pg/mL Final     Comment:     Results may be falsely decreased if patient taking Biotin.     Results for orders placed during the hospital encounter of 07/11/18   Adult Transthoracic Echo Complete W/ Cont if Necessary Per Protocol    Narrative · Left ventricular systolic function is normal. Estimated EF = 65%.  · Left ventricular wall thickness is consistent with mild concentric   hypertrophy.  · Left ventricular diastolic dysfunction (grade II) consistent with   pseudonormalization.  · Estimated right ventricular systolic pressure from tricuspid   regurgitation is normal (<35 mmHg).          I have reviewed the medications.    Assessment/Plan   Assessment / Plan     Hospital Problem List     * (Principal)Acute on chronic hypoxemic respiratory failure not requiring ventilatory support    Mixed anxiety depressive disorder    Atrial fibrillation (CMS/HCC)    COPD (chronic obstructive pulmonary disease) (CMS/HCC)    Type 2 diabetes mellitus (CMS/HCC)    Dyslipidemia    Hypertension    Pulmonary nodule right lower lobe medially (2 x 1.5 cm).    Overview Signed 5/22/2017  1:30 PM by Janine Mckeon PA-C     Description: A.  CT scan of the chest February 11 thousand 16 reports a noncalcified 8.5-9 mm nodule in the medial portion of the right lower lobe.         Rheumatoid arthritis (CMS/HCC)    Invasive ductal carcinoma of breast, left (CMS/HCC)    Chronic hypoxemic respiratory failure (CMS/HCC)    Overview Signed 7/12/2018  2:01 PM by Blade Taylor MD     2Lnc chronically         Dyspnea    Atypical chest pain    Acute on chronic diastolic CHF (congestive heart failure) (CMS/HCC)    Constipation          Brief Hospital Course to date:  Betsy Chi is a 62 y.o. female w/ metastatic breast cancer, copd,  chronic 2Lnc use, ? Hx afib, dm2 ra presented as direct admisison from dr. mendoza office for multiple complaints including diffuse pain, dyspnea.      Assessment & Plan:  - Abdominal pain and vomiting, etiology unknown, continue supportive therapy as below. IVF, antiemetics  - BRET, suspect this is pre-renal, sec to n/v, Cr baseline is 0.7-0.8, 1.26 today, will start IVF, send UA  - Metastatic breast CA , Dr Mendoza following, continue letrozole, s/p zometa 7/12, plan to start ibrance once she returns home  - Suspected dysphagia, ?pharyngeal ,SLP consulted to evaluate, continue protonix  - Acute respiratory failure, suspect volume overload, echo normal ef, diastolic dysfunction, ct angio no pulm embolism, continue prn diuresis, O2 supp, hold on steroids  - Back pain, suspect this is sec to bony mets, palliative consulted and asisting  - Cards & heme-onc & palliative following (no ischemic evaluation, treat medically), suspected Afib per cards notes (data deficient)  - Continue bowel regimen (miralax bid, senokot bid, prn dulcolax) as constipation likely causing some discomfort  - steroid cream to rash low back    DVT Prophylaxis: [facility]     CODE STATUS:   Code Status and Medical Interventions:   Ordered at: 07/11/18 1254     Level Of Support Discussed With:    Patient     Code Status:    CPR     Medical Interventions (Level of Support Prior to Arrest):    Full       Disposition: anticipate d/c in 1-2 days    Electronically signed by Michael Muniz MD, 07/13/18, 11:12 AM.

## 2018-07-13 NOTE — PROGRESS NOTES
Continued Stay Note  [facility]     Patient Name: Betsy Chi  MRN: [id number]  Today's Date: 7/13/2018    Admit Date: 7/11/2018          Discharge Plan     Row Name 07/13/18 1430       Plan    Plan Home    Plan Comments Plan is home w/ nephew in a couple days.   Palliative is monitoring new pain medications.  Speech therapy eval..  CM following.       Final Discharge Disposition Code 01 - home or self-care              Discharge Codes    No documentation.           Agueda Ahumada RN

## 2018-07-13 NOTE — PROGRESS NOTES
Subjective     PROBLEM LIST:  1. fT1T1M8 invasive ductal carcinoma of the left breast, ER+, W[place]+, Her2 negative  A) presented with a palpable mass in the left breast as well as skin thickening.  biopsy showed multifocal low grade IDC.  Skin biopsy negative.  B) PET/CT 6/6/18 showed widespread bony metastatic disease  2. History of right breast cancer 2001, treated with lumpectomy and SLNB, ? CMF, and adjuvant hormonal therapy  3. Anxiety/depression  4. Rheumatoid arthritis  5. Asthma  6. CHF  7. COPD  8. Diabetes mellitus type 2  9. GERD  10. HTN  11. HL  12. CVA  13. Tobacco abuse  14. choriocarcinoma  15. Fibromyalgia  16. Atrial fibrillation    INTERVAL HISTORY: feeling ok today.  Having some back pain.  Still reports some vomiting of food and medicines.  Breathing and swelling much improved.      REVIEW OF SYSTEMS:  A 14 point review of systems was performed and is negative except as noted above.      Objective     Vitals:    07/12/18 2325 07/12/18 2338 07/13/18 0320 07/13/18 0412   BP: 105/68  104/75    BP Location: Right arm  Right arm    Patient Position: Lying  Lying    Pulse: 81 80 71 77   Resp: 18 16 18 16   Temp: 98.1 °F (36.7 °C)  98.1 °F (36.7 °C)    TempSrc: Oral  Oral    SpO2:  95%     Weight:       Height:             General:  female in no acute distress  Neuro: alert and oriented  HEENT: sclera anicteric, oropharynx clear  Lymphatics: no cervical, supraclavicular, or axillary adenopathy  Cardiovascular: regular rate and rhythm, no murmurs  Lungs: clear to auscultation bilaterally  Abdomen: soft, nontender, nondistended.  No palpable organomegaly  Extremeties: no lower extremity edema  Skin: no rashes, lesions, bruising, or petechiae  Psych: mood and affect appropriate            Assessment/Plan     Betsy Chi is a 62 y.o. year old female with metastatic breast cancer, admitted for pain management, dyspnea, evaluation of abdominal pain/vomiting.     Metastatic breast cancer: continue  letrozole.  zometa received yesterday.  Start ibrance after she returns home.     Abdominal pain/vomiting: still vomiting some and reports food/medicines coming back up.  Will order speech/swallow eval.    Pain: c/o back pain, likely 2/2 bony mets.  Management per palliative care.  Will order prn tylenol (allergy to narcotics, tramadol, ibuprofen).    Home once symptoms adequately managed, possibly today or tomorrow?              Mary Mendoza MD  7/13/2018

## 2018-07-13 NOTE — PROGRESS NOTES
Palliative Care Progress Note    Date of Admission: 7/11/2018    Subjective:  Patient states that she continues to have significant amount of pain which is occurring in her lower back area, as well as her hip areas.  Current Code Status     Date Active Code Status Order ID Comments User Context       7/11/2018 12:54 PM CPR [id number]  Ania De Leon, DO Inpatient       Questions for Current Code Status     Question Answer Comment    Code Status CPR     Medical Interventions (Level of Support Prior to Arrest) Full     Level Of Support Discussed With Patient         No current facility-administered medications on file prior to encounter.      Current Outpatient Prescriptions on File Prior to Encounter   Medication Sig Dispense Refill   • albuterol (PROVENTIL HFA;VENTOLIN HFA) 108 (90 Base) MCG/ACT inhaler Inhale 2 puffs Every 4 (Four) Hours As Needed for Wheezing. 1 inhaler 2   • aluminum acetate (DOMEBORO) pack packet Apply  topically 3 (Three) Times a Day As Needed (psoriatic arthritis). 100 each 6   • amLODIPine (NORVASC) 10 MG tablet TAKE ONE TABLET BY MOUTH DAILY 30 tablet 0   • atorvastatin (LIPITOR) 20 MG tablet Take 1 tablet by mouth Every Night. 90 tablet 0   • carvedilol (COREG) 12.5 MG tablet TAKE ONE TABLET BY MOUTH TWICE A DAY WITH MEALS 60 tablet 0   • Cranberry 600 MG tablet Take 1 tablet by mouth Daily.     • glucose blood test strip Use as instructed 100 each 12   • glucose monitor monitoring kit 1 each 3 (Three) Times a Day As Needed (hypo/hyperglycemia). 1 each 3   • Lancets (ACCU-CHEK SOFT TOUCH) lancets Three times daily and as needed 100 each 12   • letrozole (FEMARA) 2.5 MG tablet Take 1 tablet by mouth Daily. 30 tablet 11   • ondansetron (ZOFRAN) 8 MG tablet Take 1 tablet by mouth 3 (Three) Times a Day As Needed for Nausea or Vomiting. 30 tablet 5   • pantoprazole (PROTONIX) 40 MG EC tablet Take 1 tablet by mouth Daily. 90 tablet 2       sodium chloride 75 mL/hr Last Rate: 75 mL/hr (07/13/18  "1122)     •  acetaminophen  •  bisacodyl  •  dextrose  •  dextrose  •  diphenhydrAMINE **OR** diphenhydrAMINE  •  fentaNYL citrate (PF)  •  glucagon (human recombinant)  •  LORazepam  •  ondansetron  •  polyethylene glycol  •  sodium chloride    Objective: /67   Pulse 92   Temp 97.9 °F (36.6 °C) (Oral)   Resp 18   Ht 160 cm (62.99\")   Wt 82.1 kg (181 lb)   SpO2 92%   BMI 32.07 kg/m²      Intake/Output Summary (Last 24 hours) at 07/13/18 1340  Last data filed at 07/13/18 1300   Gross per 24 hour   Intake             1900 ml   Output             1550 ml   Net              350 ml     Physical Exam:      General Appearance:    Alert, cooperative, At times she is tearful during the exam    Head:    Normocephalic, without obvious abnormality, atraumatic   Eyes:            Lids and lashes normal, conjunctivae and sclerae normal, no   icterus, no pallor, corneas clear, PERRLA   Ears:    Ears appear intact with no abnormalities noted   Throat:   No oral lesions, no thrush, oral mucosa moist   Neck:   No adenopathy, supple, trachea midline, no thyromegaly, no     carotid bruit, no JVD   Back:     No kyphosis present, no scoliosis present, no skin lesions,       erythema or scars, no tenderness to percussion or                   palpation,   range of motion normal   Lungs:     Clear to auscultation,respirations regular, even and                   unlabored    Heart:    Regular rhythm and normal rate, normal S1 and S2, no            murmur, no gallop, no rub, no click   Breast Exam:    Deferred   Abdomen:     Normal bowel sounds, no masses, no organomegaly, soft        non-tender, non-distended, no guarding, no rebound                 tenderness   Genitalia:    Deferred   Extremities:   Moves all extremities well, no edema, no cyanosis, no              redness   Pulses:   Pulses palpable and equal bilaterally   Skin:   No bleeding, bruising or rash   Lymph nodes:   No palpable adenopathy   Neurologic:   Cranial " nerves 2 - 12 grossly intact, sensation intact, DTR        present and equal bilaterally       Results from last 7 days  Lab Units 07/11/18  1308   WBC 10*3/mm3 7.35   HEMOGLOBIN g/dL 14.1   HEMATOCRIT % 42.7   PLATELETS 10*3/mm3 230       Results from last 7 days  Lab Units 07/13/18  0632 07/11/18  1308   SODIUM mmol/L 140 142   POTASSIUM mmol/L 4.2 4.6   CHLORIDE mmol/L 105 107   CO2 mmol/L 25.0 27.0   BUN mg/dL 18 15   CREATININE mg/dL 1.26 1.07   CALCIUM mg/dL 8.7 9.5   BILIRUBIN mg/dL  --  0.8   ALK PHOS U/L  --  138*   ALT (SGPT) U/L  --  28   AST (SGOT) U/L  --  24   GLUCOSE mg/dL 96 102*       Impression: Breast cancer with bone metastasis  Dysphagia  Dyspnea  Goals of care  Plan: This is deftly very difficult case as the patient has apparent anaphylactic reactions to a large amount of medicines.  I did discuss with pharmacy and we will try to use fentanyl for pain control as it is a synthetic drug.  I would like to keep the patient in the hospital at least the next day to couple of days to see how she is going to react to the medication.  Patient herself seems to be somewhat dubious about whether she will try to medicine or not so I strongly encouraged her to.        Nadir Ojeda,   07/13/18  1:40 PM

## 2018-07-13 NOTE — PLAN OF CARE
Problem: Patient Care Overview  Goal: Plan of Care Review  Outcome: Ongoing (interventions implemented as appropriate)   07/12/18 2000 07/13/18 0358   Coping/Psychosocial   Plan of Care Reviewed With patient --    Plan of Care Review   Progress --  no change   OTHER   Outcome Summary --  Pt given ativan overnight. Changed to 3L NC. Continue to monitor.       Problem: Fall Risk (Adult)  Goal: Identify Related Risk Factors and Signs and Symptoms  Outcome: Ongoing (interventions implemented as appropriate)      Problem: ARDS (Acute Resp Distress Syndrome) (Adult)  Goal: Signs and Symptoms of Listed Potential Problems Will be Absent, Minimized or Managed (ARDS)  Outcome: Ongoing (interventions implemented as appropriate)

## 2018-07-13 NOTE — PROGRESS NOTES
[facility] at [facility]    Inpatient Progress Note      Chief Complaint/Reason for service:    · Chest Pain         Subjective:       Patient reports feeling better.  She reports instances of chest/epigastric/breast/back pain overnight.  States her shortness of breath is much improved. Symptoms mildly improved with passing stool    Past medical, surgical, social and family history reviewed in the patient's electronic medical record.    Review of Systems:   Positive for chest pain, shortness of breath, left breast pain, constipation  Negative for lower extremity edema, palpitations.    Problem List  Active Hospital Problems    Diagnosis Date Noted   • **Acute on chronic hypoxemic respiratory failure not requiring ventilatory support [J96.91] 04/23/2018   • Chronic hypoxemic respiratory failure (CMS/HCC) [J96.11] 07/12/2018   • Dyspnea [R06.00] 07/12/2018   • Atypical chest pain [R07.89] 07/12/2018   • Acute on chronic diastolic CHF (congestive heart failure) (CMS/[facility]) [I50.33] 07/12/2018   • Constipation [K59.00] 07/12/2018   • Invasive ductal carcinoma of breast, left (CMS/[facility]) [C50.912] 05/07/2018   • Mixed anxiety depressive disorder [F41.8] 05/22/2017   • Atrial fibrillation (CMS/[facility]) [I48.91] 05/22/2017   • Type 2 diabetes mellitus (CMS/[facility]) [E11.9] 05/22/2017   • Dyslipidemia [E78.5] 05/22/2017   • Hypertension [I10] 05/22/2017   • Rheumatoid arthritis (CMS/[facility]) [M06.9] 05/22/2017   • Pulmonary nodule right lower lobe medially (2 x 1.5 cm). [R91.8] 05/22/2017   • COPD (chronic obstructive pulmonary disease) (CMS/[facility]) [J44.9] 05/22/2017      Resolved Hospital Problems    Diagnosis Date Noted Date Resolved   No resolved problems to display.            Objective:      Current Facility-Administered Medications:   •  acetaminophen (TYLENOL) tablet 650 mg, 650 mg, Oral, Q6H PRN, Mary Mendoza MD  •  atorvastatin (LIPITOR) tablet 20 mg, 20 mg, Oral, Nightly, Ania L Atkins, DO, 20 mg at  07/13/18 0019  •  bisacodyl (DULCOLAX) suppository 10 mg, 10 mg, Rectal, BID PRN, Blade Taylor MD  •  carvedilol (COREG) tablet 3.125 mg, 3.125 mg, Oral, BID With Meals, Ania L Atkins, DO, 3.125 mg at 07/13/18 0815  •  dextrose (D50W) solution 25 g, 25 g, Intravenous, Q15 Min PRN, Ania L Atkins, DO  •  dextrose (GLUTOSE) oral gel 15 g, 15 g, Oral, Q15 Min PRN, Ania L Atkins, DO  •  diphenhydrAMINE (BENADRYL) injection 25 mg, 25 mg, Intravenous, Q6H PRN, 25 mg at 07/13/18 0824 **OR** diphenhydrAMINE (BENADRYL) injection 25 mg, 25 mg, Intramuscular, Q6H PRN, Josette Lares MD  •  glucagon (human recombinant) (GLUCAGEN DIAGNOSTIC) injection 1 mg, 1 mg, Subcutaneous, PRN, Ania L Atkins, DO  •  heparin (porcine) 5000 UNIT/ML injection 5,000 Units, 5,000 Units, Subcutaneous, Q8H, Ania L Atkins, DO, 5,000 Units at 07/13/18 0602  •  insulin lispro (humaLOG) injection 0-7 Units, 0-7 Units, Subcutaneous, 4x Daily With Meals & Nightly, Ania L Atkins, DO  •  ipratropium-albuterol (DUO-NEB) nebulizer solution 3 mL, 3 mL, Nebulization, Q4H - RT, Ania L Atkins, DO, 3 mL at 07/13/18 0745  •  letrozole (FEMARA) tablet 2.5 mg, 2.5 mg, Oral, Daily, Ania L Atkins, DO, 2.5 mg at 07/13/18 0815  •  LORazepam (ATIVAN) tablet 0.5 mg, 0.5 mg, Oral, Q6H PRN, Nadir Ojeda, DO, 0.5 mg at 07/13/18 0023  •  ondansetron (ZOFRAN) injection 4 mg, 4 mg, Intravenous, Q6H PRN, Ania L Atkins, DO  •  pantoprazole (PROTONIX) EC tablet 40 mg, 40 mg, Oral, Q AM, Ania De Leon DO, 40 mg at 07/13/18 0602  •  Pharmacy Consult - [facility], , Does not apply, Daily, Evelyne Rai [facility]  •  polyethylene glycol 3350 powder (packet), 17 g, Oral, BID PRN, Blade Taylor MD  •  sennosides-docusate sodium (SENOKOT-S) 8.6-50 MG tablet 2 tablet, 2 tablet, Oral, BID, Blade Taylor MD, 2 tablet at 07/13/18 0815  •  sodium chloride 0.9 % flush 1-10 mL, 1-10 mL, Intravenous, PRN, Ania De Leon DO  •  sodium chloride 0.9 %  infusion, 75 mL/hr, Intravenous, Continuous, Michael Muniz MD  •  triamcinolone (KENALOG) 0.1 % cream, , Topical, Q12H, Blade Taylor MD    Vital Sign Min/Max for last 24 hours  Temp  Min: 97.9 °F (36.6 °C)  Max: 98.8 °F (37.1 °C)   BP  Min: 93/59  Max: 134/67   Pulse  Min: 69  Max: 100   Resp  Min: 16  Max: 18   SpO2  Min: 89 %  Max: 95 %   No Data Recorded      Intake/Output Summary (Last 24 hours) at 07/13/18 0917  Last data filed at 07/13/18 0800   Gross per 24 hour   Intake              660 ml   Output             1350 ml   Net             -690 ml           CONSTITUTIONAL: No acute distress, normal affect  RESPIRATORY: Normal effort. Clear to auscultation bilaterally without wheezing or rales  CARDIOVASCULAR: Regular rate and rhythm with normal S1 and S2. Without murmur, gallop or rub.  PERIPHERAL VASCULAR: Normal radial pulses bilaterally. There is no peripheral edema bilaterally.    Results Review:   Lab Results   Component Value Date    TROPONINI <0.006 07/11/2018       Glucose   Date Value Ref Range Status   07/13/2018 96 70 - 100 mg/dL Final     BUN   Date Value Ref Range Status   07/13/2018 18 9 - 23 mg/dL Final     Creatinine   Date Value Ref Range Status   07/13/2018 1.26 0.60 - 1.30 mg/dL Final     Sodium   Date Value Ref Range Status   07/13/2018 140 132 - 146 mmol/L Final     Potassium   Date Value Ref Range Status   07/13/2018 4.2 3.5 - 5.5 mmol/L Final     Chloride   Date Value Ref Range Status   07/13/2018 105 99 - 109 mmol/L Final     CO2   Date Value Ref Range Status   07/13/2018 25.0 20.0 - 31.0 mmol/L Final     Calcium   Date Value Ref Range Status   07/13/2018 8.7 8.7 - 10.4 mg/dL Final     Total Protein   Date Value Ref Range Status   07/11/2018 7.8 5.7 - 8.2 g/dL Final     Albumin   Date Value Ref Range Status   07/11/2018 4.94 (H) 3.20 - 4.80 g/dL Final     ALT (SGPT)   Date Value Ref Range Status   07/11/2018 28 7 - 40 U/L Final     AST (SGOT)   Date Value Ref Range Status    07/11/2018 24 0 - 33 U/L Final     Alkaline Phosphatase   Date Value Ref Range Status   07/11/2018 138 (H) 25 - 100 U/L Final     Total Bilirubin   Date Value Ref Range Status   07/11/2018 0.8 0.3 - 1.2 mg/dL Final     eGFR Non  Amer   Date Value Ref Range Status   07/13/2018 43 (L) >60 mL/min/1.73 Final     A/G Ratio   Date Value Ref Range Status   07/11/2018 1.7 1.5 - 2.5 g/dL Final     BUN/Creatinine Ratio   Date Value Ref Range Status   07/13/2018 14.3 7.0 - 25.0 Final     Anion Gap   Date Value Ref Range Status   07/13/2018 10.0 3.0 - 11.0 mmol/L Final       Lab Results   Component Value Date    CHOL 157 04/10/2018     Lab Results   Component Value Date    TRIG 110 04/10/2018    TRIG 55 09/25/2015    TRIG 90 07/07/2015     Lab Results   Component Value Date    HDL 39 (L) 04/10/2018    HDL 35 (L) 09/25/2015    HDL 40 07/07/2015     No components found for: LDLCALC  No results found for: VLDL  No results found for: LDLHDL    Tele:  NSR    TTE 7/12/2018  · Left ventricular systolic function is normal. Estimated EF = 65%.  · Left ventricular wall thickness is consistent with mild concentric hypertrophy.  · Left ventricular diastolic dysfunction (grade II) consistent with pseudonormalization.  · Estimated right ventricular systolic pressure from tricuspid regurgitation is normal (<35 mmHg).         Assessment/Plan:     ASSESSMENT:  -Shortness of breath, improved with administration of IV Lasix, known history of COPD, PE. CTA negative for PE. Volume overload possibly due to acute on chronic diastolic heart failure but BNP unremarkable. Dyspnea/swelling improved with Lasix  -Chest and epigastric pain, atypical in nature, recent diagnosis of Left sided breast cancer with bony metastases.  Notable amount of stool in left epigastric area on CTA.  -Metastatic breast cancer  -Atrial fibrillation, diagnosis is not clear, data deficient; if details could be found to confirm a prior diagnosis, would recommend  anticoagulation. [id number]= 6.  -Hypertension, stable this admission  -Diastolic heart failure  -COPD  -History of DVT    PLAN:  -Defer ischemic evaluation for now.   -Wouldn't treat with anti-coagulants for AFib unless rhythm is more definitely seen/documented  -Additional IV Lasix PRN   -Consider stress testing if with persistent chest pains not attributable to constipation, breast pain/bony pain  -Will sign off for now. Please feel free to call with any questions or concerns    PRAFUL Girard obtained past medical, family history, social history, review of systems and functioned as a scribe for the remainder of the dictation for Dr. Davis.  7/13/2018    I, Zohaib Davis MD, personally performed the services as scribed by the above named individual. I have made any necessary edits and it is both accurate and complete.     Zohaib Davis MD, MSc, [facility]  Interventional Cardiology  [facility] at [facility]

## 2018-07-14 LAB
ANION GAP SERPL CALCULATED.3IONS-SCNC: 7 MMOL/L (ref 3–11)
BUN BLD-MCNC: 10 MG/DL (ref 9–23)
BUN/CREAT SERPL: 14.5 (ref 7–25)
CALCIUM SPEC-SCNC: 8 MG/DL (ref 8.7–10.4)
CHLORIDE SERPL-SCNC: 110 MMOL/L (ref 99–109)
CO2 SERPL-SCNC: 23 MMOL/L (ref 20–31)
CREAT BLD-MCNC: 0.69 MG/DL (ref 0.6–1.3)
GFR SERPL CREATININE-BSD FRML MDRD: 86 ML/MIN/1.73
GLUCOSE BLD-MCNC: 113 MG/DL (ref 70–100)
GLUCOSE BLDC GLUCOMTR-MCNC: 101 MG/DL (ref 70–130)
GLUCOSE BLDC GLUCOMTR-MCNC: 104 MG/DL (ref 70–130)
GLUCOSE BLDC GLUCOMTR-MCNC: 107 MG/DL (ref 70–130)
GLUCOSE BLDC GLUCOMTR-MCNC: 98 MG/DL (ref 70–130)
POTASSIUM BLD-SCNC: 3.9 MMOL/L (ref 3.5–5.5)
SODIUM BLD-SCNC: 140 MMOL/L (ref 132–146)

## 2018-07-14 PROCEDURE — 94799 UNLISTED PULMONARY SVC/PX: CPT

## 2018-07-14 PROCEDURE — 25010000002 MEROPENEM: Performed by: NURSE PRACTITIONER

## 2018-07-14 PROCEDURE — 25010000002 DIPHENHYDRAMINE PER 50 MG: Performed by: INTERNAL MEDICINE

## 2018-07-14 PROCEDURE — 82962 GLUCOSE BLOOD TEST: CPT

## 2018-07-14 PROCEDURE — 80048 BASIC METABOLIC PNL TOTAL CA: CPT | Performed by: INTERNAL MEDICINE

## 2018-07-14 PROCEDURE — 94760 N-INVAS EAR/PLS OXIMETRY 1: CPT

## 2018-07-14 PROCEDURE — 25010000002 HEPARIN (PORCINE) PER 1000 UNITS: Performed by: FAMILY MEDICINE

## 2018-07-14 PROCEDURE — 94640 AIRWAY INHALATION TREATMENT: CPT

## 2018-07-14 PROCEDURE — 99233 SBSQ HOSP IP/OBS HIGH 50: CPT | Performed by: INTERNAL MEDICINE

## 2018-07-14 RX ADMIN — CARVEDILOL 3.12 MG: 3.12 TABLET, FILM COATED ORAL at 08:25

## 2018-07-14 RX ADMIN — HEPARIN SODIUM 5000 UNITS: 5000 INJECTION, SOLUTION INTRAVENOUS; SUBCUTANEOUS at 12:46

## 2018-07-14 RX ADMIN — TRIAMCINOLONE ACETONIDE: 1 CREAM TOPICAL at 21:21

## 2018-07-14 RX ADMIN — IPRATROPIUM BROMIDE AND ALBUTEROL SULFATE 3 ML: 2.5; .5 SOLUTION RESPIRATORY (INHALATION) at 19:07

## 2018-07-14 RX ADMIN — TRIAMCINOLONE ACETONIDE: 1 CREAM TOPICAL at 08:26

## 2018-07-14 RX ADMIN — IPRATROPIUM BROMIDE AND ALBUTEROL SULFATE 3 ML: 2.5; .5 SOLUTION RESPIRATORY (INHALATION) at 03:18

## 2018-07-14 RX ADMIN — IPRATROPIUM BROMIDE AND ALBUTEROL SULFATE 3 ML: 2.5; .5 SOLUTION RESPIRATORY (INHALATION) at 23:09

## 2018-07-14 RX ADMIN — HEPARIN SODIUM 5000 UNITS: 5000 INJECTION, SOLUTION INTRAVENOUS; SUBCUTANEOUS at 05:53

## 2018-07-14 RX ADMIN — IPRATROPIUM BROMIDE AND ALBUTEROL SULFATE 3 ML: 2.5; .5 SOLUTION RESPIRATORY (INHALATION) at 15:19

## 2018-07-14 RX ADMIN — ATORVASTATIN CALCIUM 20 MG: 20 TABLET, FILM COATED ORAL at 21:20

## 2018-07-14 RX ADMIN — IPRATROPIUM BROMIDE AND ALBUTEROL SULFATE 3 ML: 2.5; .5 SOLUTION RESPIRATORY (INHALATION) at 07:10

## 2018-07-14 RX ADMIN — CARVEDILOL 3.12 MG: 3.12 TABLET, FILM COATED ORAL at 17:49

## 2018-07-14 RX ADMIN — IPRATROPIUM BROMIDE AND ALBUTEROL SULFATE 3 ML: 2.5; .5 SOLUTION RESPIRATORY (INHALATION) at 11:56

## 2018-07-14 RX ADMIN — IPRATROPIUM BROMIDE AND ALBUTEROL SULFATE 3 ML: 2.5; .5 SOLUTION RESPIRATORY (INHALATION) at 00:13

## 2018-07-14 RX ADMIN — SENNOSIDES AND DOCUSATE SODIUM 2 TABLET: 8.6; 5 TABLET ORAL at 08:26

## 2018-07-14 RX ADMIN — HEPARIN SODIUM 5000 UNITS: 5000 INJECTION, SOLUTION INTRAVENOUS; SUBCUTANEOUS at 21:20

## 2018-07-14 RX ADMIN — LETROZOLE 2.5 MG: 2.5 TABLET, FILM COATED ORAL at 08:26

## 2018-07-14 RX ADMIN — MEROPENEM 1 G: 1 INJECTION, POWDER, FOR SOLUTION INTRAVENOUS at 12:46

## 2018-07-14 RX ADMIN — PANTOPRAZOLE SODIUM 40 MG: 40 TABLET, DELAYED RELEASE ORAL at 05:53

## 2018-07-14 RX ADMIN — DIPHENHYDRAMINE HYDROCHLORIDE 25 MG: 50 INJECTION INTRAMUSCULAR; INTRAVENOUS at 17:50

## 2018-07-14 RX ADMIN — MEROPENEM 1 G: 1 INJECTION, POWDER, FOR SOLUTION INTRAVENOUS at 01:26

## 2018-07-14 NOTE — PLAN OF CARE
Problem: Patient Care Overview  Goal: Plan of Care Review  Outcome: Ongoing (interventions implemented as appropriate)   07/14/18 3652   Coping/Psychosocial   Plan of Care Reviewed With patient   Plan of Care Review   Progress no change   OTHER   Outcome Summary Pt appears to have slept comfortably once she fell asleep. Pt voices acceptance of situation but would like a clear picture of treatment choices. Pt transferring safely, alarms not currently in place. Pt voices plan to seek assistance as needed. Interventions utilized to maintain skin integrity. VSS, maintaining in normal sinus rhythm on 2L nasal cannula.

## 2018-07-14 NOTE — PROGRESS NOTES
[facility] Medicine Services  PROGRESS NOTE    Patient Name: Betsy Chi  : 1955  MRN: [id number]    Date of Admission: 2018  Length of Stay: 3  Primary Care Physician: PRAFUL Wheatley    Subjective   Subjective     CC:abd pain , n/v    HPI: No acute events overnight, patient states that she is feeling better     Review of Systems  Gen- No fevers, chills  CV- No chest pain, palpitations  Resp- No cough, dyspnea  GI- No N/V/D, abd pain    Otherwise ROS is negative except as mentioned in the HPI.    Objective   Objective     Vital Signs:   Temp:  [97.7 °F (36.5 °C)-98.5 °F (36.9 °C)] 97.9 °F (36.6 °C)  Heart Rate:  [] 80  Resp:  [16-18] 18  BP: (106-137)/(60-83) 106/60      Physical Exam:  Constitutional: No acute distress, awake, alert  HENT: NCAT, mucous membranes moist  Respiratory: Clear to auscultation bilaterally, respiratory effort normal   Cardiovascular: RRR, no murmurs, rubs, or gallops, palpable pedal pulses bilaterally  Gastrointestinal: Positive bowel sounds, soft, nontender, nondistended  Musculoskeletal: No bilateral ankle edema  Psychiatric: Appropriate affect, cooperative  Neurologic: Oriented x 3, strength symmetric in all extremities, Cranial Nerves grossly intact to confrontation, speech clear  Skin: No rashes    Results Reviewed:  I have personally reviewed current lab, radiology, and data and agree.      Results from last 7 days  Lab Units 18  1308   WBC 10*3/mm3 7.35   HEMOGLOBIN g/dL 14.1   HEMATOCRIT % 42.7   PLATELETS 10*3/mm3 230       Results from last 7 days  Lab Units 18  0744 18  0632 18  1405 18  1308   SODIUM mmol/L 140 140  --  142   POTASSIUM mmol/L 3.9 4.2  --  4.6   CHLORIDE mmol/L 110* 105  --  107   CO2 mmol/L 23.0 25.0  --  27.0   BUN mg/dL 10 18  --  15   CREATININE mg/dL 0.69 1.26  --  1.07   GLUCOSE mg/dL 113* 96  --  102*   CALCIUM mg/dL 8.0* 8.7  --  9.5   ALT (SGPT) U/L  --   --   --  28    AST (SGOT) U/L  --   --   --  24   TROPONIN I ng/mL  --   --  <0.006 <0.006     Estimated Creatinine Clearance: 83.6 mL/min (by C-G formula based on SCr of 0.69 mg/dL).  BNP   Date Value Ref Range Status   07/11/2018 60.0 0.0 - 100.0 pg/mL Final     Comment:     Results may be falsely decreased if patient taking Biotin.       Imaging Results (last 24 hours)     Procedure Component Value Units Date/Time    Fiberoptic Endo (fees) [[id number]] Resulted:  07/13/18 1214     Updated:  07/13/18 1214    Narrative:       This procedure was auto-finalized with no dictation required.        Results for orders placed during the hospital encounter of 07/11/18   Adult Transthoracic Echo Complete W/ Cont if Necessary Per Protocol    Narrative · Left ventricular systolic function is normal. Estimated EF = 65%.  · Left ventricular wall thickness is consistent with mild concentric   hypertrophy.  · Left ventricular diastolic dysfunction (grade II) consistent with   pseudonormalization.  · Estimated right ventricular systolic pressure from tricuspid   regurgitation is normal (<35 mmHg).          I have reviewed the medications.    Assessment/Plan   Assessment / Plan     Hospital Problem List     * (Principal)Acute on chronic hypoxemic respiratory failure not requiring ventilatory support    Mixed anxiety depressive disorder    Atrial fibrillation (CMS/HCC)    COPD (chronic obstructive pulmonary disease) (CMS/HCC)    Type 2 diabetes mellitus (CMS/HCC)    Dyslipidemia    Hypertension    Pulmonary nodule right lower lobe medially (2 x 1.5 cm).    Overview Signed 5/22/2017  1:30 PM by Janine Mckeon PA-C     Description: A.  CT scan of the chest February 11 thousand 16 reports a noncalcified 8.5-9 mm nodule in the medial portion of the right lower lobe.         Rheumatoid arthritis (CMS/HCC)    Invasive ductal carcinoma of breast, left (CMS/HCC)    Chronic hypoxemic respiratory failure (CMS/HCC)    Overview Signed 7/12/2018  2:01 PM by  Blade Taylor MD     2Lnc chronically         Dyspnea    Atypical chest pain    Acute on chronic diastolic CHF (congestive heart failure) (CMS/HCC)    Constipation           Brief Hospital Course to date:  Betsy Chi is a 62 y.o. female w/ metastatic breast cancer, copd, chronic 2Lnc use, ? Hx afib, dm2 ra presented as direct admisison from dr. centeno office for multiple complaints including diffuse pain, dyspnea.      Assessment & Plan:  - Abdominal pain and vomiting, etiology unknown, continue supportive therapy as below. IVF, antiemetics  - BRET, suspect this is pre-renal, sec to n/v, Cr baseline is 0.7-0.8, 1.26 today, continue IVF  - UA suggestive of UTI, started on merrem, f/u urine cultures  - Metastatic breast CA , Dr Centeno following, continue letrozole, s/p zometa 7/12, plan to start ibrance once she returns home  - Suspected dysphagia, ?pharyngeal ,SLP consulted to evaluate, continue protonix  - Acute respiratory failure, suspect volume overload, echo normal ef, diastolic dysfunction, ct angio no pulm embolism, continue prn diuresis, O2 supp, hold on steroids  - Back pain, suspect this is sec to bony mets, palliative consulted and asisting  - Cards & heme-onc & palliative following (no ischemic evaluation, treat medically), suspected Afib per cards notes (data deficient)  - Continue bowel regimen (miralax bid, senokot bid, prn dulcolax) as constipation likely causing some discomfort  - steroid cream to rash low back     DVT Prophylaxis: [facility]      CODE STATUS:   Code Status and Medical Interventions:   Ordered at: 07/11/18 1254     Level Of Support Discussed With:    Patient     Code Status:    CPR     Medical Interventions (Level of Support Prior to Arrest):    Full     Disposition:anticipate d/c in 1-2 days    Electronically signed by Michael Muniz MD, 07/14/18, 11:28 AM.

## 2018-07-14 NOTE — PLAN OF CARE
Problem: Patient Care Overview  Goal: Plan of Care Review  Outcome: Ongoing (interventions implemented as appropriate)   07/14/18 1154   Coping/Psychosocial   Plan of Care Reviewed With patient   Plan of Care Review   Progress no change       Problem: Fall Risk (Adult)  Goal: Absence of Fall  Outcome: Ongoing (interventions implemented as appropriate)   07/14/18 1154   Fall Risk (Adult)   Absence of Fall making progress toward outcome

## 2018-07-14 NOTE — PLAN OF CARE
"Problem: Patient Care Overview  Goal: Interprofessional Rounds/Family Conf  Outcome: Ongoing (interventions implemented as appropriate)  Palliative Team Conference: LEVI Maldonado RN, PN; PRAFUL Rosales   07/14/18 7923   Interdisciplinary Rounds/Family Conf   Summary Has not utilized Fentanyl for pain, pt stated her pain hasn't been \"too bad\", and cites concerns about trying new meds due to her multiple allergies. Reinforced teaaching about Fentanyl as fully synthetic, encouraged to utilize as needed, best to try while in the hospital if at all. Pt stated feeling better overall today.       Problem: Palliative Care (Adult)  Intervention: Minimize Discomfort   07/14/18 3762   Promote Oxygenation/Perfusion   Pain Management Interventions pain management plan reviewed with patient/caregiver           "

## 2018-07-14 NOTE — SIGNIFICANT NOTE
[facility] Medicine Services  CROSS COVER NOTE        EVENT:  UA Result    UA obtained earlier in day due to dysuria, + UTI    OBJECTIVE DATA:  Vitals:    07/14/18 0013   BP:    Pulse: 97   Resp: 18   Temp:    SpO2:          ACTION TAKEN:  Patient has significant list of allergies to antibiotics. After consulting with pharmacy, will start merrem due to allergies.     FOLLOW UP REQUIRED:  Will need follow up on urine culture. Consider deesclating antibiotic once culture results. Patient has tolerated azactam on previous admission.     Electronically signed by PRAFUL Marin, 07/14/18, 12:58 AM.

## 2018-07-15 LAB
BACTERIA SPEC AEROBE CULT: ABNORMAL
GLUCOSE BLDC GLUCOMTR-MCNC: 101 MG/DL (ref 70–130)
GLUCOSE BLDC GLUCOMTR-MCNC: 101 MG/DL (ref 70–130)
GLUCOSE BLDC GLUCOMTR-MCNC: 134 MG/DL (ref 70–130)
GLUCOSE BLDC GLUCOMTR-MCNC: 93 MG/DL (ref 70–130)

## 2018-07-15 PROCEDURE — 25010000002 HEPARIN (PORCINE) PER 1000 UNITS: Performed by: FAMILY MEDICINE

## 2018-07-15 PROCEDURE — 94640 AIRWAY INHALATION TREATMENT: CPT

## 2018-07-15 PROCEDURE — 82962 GLUCOSE BLOOD TEST: CPT

## 2018-07-15 PROCEDURE — 99233 SBSQ HOSP IP/OBS HIGH 50: CPT | Performed by: INTERNAL MEDICINE

## 2018-07-15 PROCEDURE — 25010000002 FENTANYL CITRATE (PF) 100 MCG/2ML SOLUTION: Performed by: NURSE PRACTITIONER

## 2018-07-15 PROCEDURE — 25010000002 MEROPENEM: Performed by: NURSE PRACTITIONER

## 2018-07-15 PROCEDURE — 94799 UNLISTED PULMONARY SVC/PX: CPT

## 2018-07-15 PROCEDURE — 25010000002 DIPHENHYDRAMINE PER 50 MG: Performed by: INTERNAL MEDICINE

## 2018-07-15 PROCEDURE — 94760 N-INVAS EAR/PLS OXIMETRY 1: CPT

## 2018-07-15 RX ORDER — FENTANYL CITRATE 50 UG/ML
12.5 INJECTION, SOLUTION INTRAMUSCULAR; INTRAVENOUS
Status: DISCONTINUED | OUTPATIENT
Start: 2018-07-15 | End: 2018-07-16

## 2018-07-15 RX ADMIN — CARVEDILOL 3.12 MG: 3.12 TABLET, FILM COATED ORAL at 17:13

## 2018-07-15 RX ADMIN — SODIUM CHLORIDE 75 ML/HR: 9 INJECTION, SOLUTION INTRAVENOUS at 09:35

## 2018-07-15 RX ADMIN — CARVEDILOL 3.12 MG: 3.12 TABLET, FILM COATED ORAL at 09:35

## 2018-07-15 RX ADMIN — DIPHENHYDRAMINE HYDROCHLORIDE 25 MG: 50 INJECTION INTRAMUSCULAR; INTRAVENOUS at 13:12

## 2018-07-15 RX ADMIN — HEPARIN SODIUM 5000 UNITS: 5000 INJECTION, SOLUTION INTRAVENOUS; SUBCUTANEOUS at 22:36

## 2018-07-15 RX ADMIN — IPRATROPIUM BROMIDE AND ALBUTEROL SULFATE 3 ML: 2.5; .5 SOLUTION RESPIRATORY (INHALATION) at 22:43

## 2018-07-15 RX ADMIN — MEROPENEM 1 G: 1 INJECTION, POWDER, FOR SOLUTION INTRAVENOUS at 13:12

## 2018-07-15 RX ADMIN — MEROPENEM 1 G: 1 INJECTION, POWDER, FOR SOLUTION INTRAVENOUS at 00:02

## 2018-07-15 RX ADMIN — PANTOPRAZOLE SODIUM 40 MG: 40 TABLET, DELAYED RELEASE ORAL at 06:37

## 2018-07-15 RX ADMIN — IPRATROPIUM BROMIDE AND ALBUTEROL SULFATE 3 ML: 2.5; .5 SOLUTION RESPIRATORY (INHALATION) at 07:05

## 2018-07-15 RX ADMIN — IPRATROPIUM BROMIDE AND ALBUTEROL SULFATE 3 ML: 2.5; .5 SOLUTION RESPIRATORY (INHALATION) at 11:50

## 2018-07-15 RX ADMIN — TRIAMCINOLONE ACETONIDE: 1 CREAM TOPICAL at 09:35

## 2018-07-15 RX ADMIN — LORAZEPAM 0.5 MG: 0.5 TABLET ORAL at 00:14

## 2018-07-15 RX ADMIN — DIPHENHYDRAMINE HYDROCHLORIDE 25 MG: 50 INJECTION INTRAMUSCULAR; INTRAVENOUS at 00:02

## 2018-07-15 RX ADMIN — DIPHENHYDRAMINE HYDROCHLORIDE 25 MG: 50 INJECTION INTRAMUSCULAR; INTRAVENOUS at 22:59

## 2018-07-15 RX ADMIN — HEPARIN SODIUM 5000 UNITS: 5000 INJECTION, SOLUTION INTRAVENOUS; SUBCUTANEOUS at 06:37

## 2018-07-15 RX ADMIN — IPRATROPIUM BROMIDE AND ALBUTEROL SULFATE 3 ML: 2.5; .5 SOLUTION RESPIRATORY (INHALATION) at 18:53

## 2018-07-15 RX ADMIN — LETROZOLE 2.5 MG: 2.5 TABLET, FILM COATED ORAL at 09:35

## 2018-07-15 RX ADMIN — HEPARIN SODIUM 5000 UNITS: 5000 INJECTION, SOLUTION INTRAVENOUS; SUBCUTANEOUS at 13:12

## 2018-07-15 RX ADMIN — IPRATROPIUM BROMIDE AND ALBUTEROL SULFATE 3 ML: 2.5; .5 SOLUTION RESPIRATORY (INHALATION) at 02:54

## 2018-07-15 RX ADMIN — IPRATROPIUM BROMIDE AND ALBUTEROL SULFATE 3 ML: 2.5; .5 SOLUTION RESPIRATORY (INHALATION) at 16:06

## 2018-07-15 RX ADMIN — LORAZEPAM 0.5 MG: 0.5 TABLET ORAL at 18:39

## 2018-07-15 RX ADMIN — TRIAMCINOLONE ACETONIDE: 1 CREAM TOPICAL at 22:36

## 2018-07-15 RX ADMIN — FENTANYL CITRATE 12.5 MCG: 50 INJECTION INTRAMUSCULAR; INTRAVENOUS at 17:16

## 2018-07-15 RX ADMIN — ATORVASTATIN CALCIUM 20 MG: 20 TABLET, FILM COATED ORAL at 22:36

## 2018-07-15 NOTE — PLAN OF CARE
Problem: Patient Care Overview  Goal: Plan of Care Review  Outcome: Ongoing (interventions implemented as appropriate)   07/15/18 0605   Coping/Psychosocial   Plan of Care Reviewed With patient   Plan of Care Review   Progress no change   OTHER   Outcome Summary Pt appears to have rested sporadically through the night. Pt up ad charbel and will call for assistance if needed. Titrated O2 up to 3L when sating in mid 80s during the night and added humidification. Other VSS, maintaining in normal sinus rhythm.

## 2018-07-15 NOTE — PROGRESS NOTES
[facility] Medicine Services  PROGRESS NOTE    Patient Name: Betsy Chi  : 1955  MRN: [id number]    Date of Admission: 2018  Length of Stay: 4  Primary Care Physician: PRAFUL Wheatley    Subjective   Subjective     CC: abd pain, n/v    HPI:No acute events overnight, patient states that she had a good night, and is doing well, nausea still there.    Review of Systems  Gen- No fevers, chills  CV- No chest pain, palpitations  Resp- No cough, dyspnea  GI- No N/V/D, abd pain    Otherwise ROS is negative except as mentioned in the HPI.    Objective   Objective     Vital Signs:   Temp:  [97.9 °F (36.6 °C)-99 °F (37.2 °C)] 99 °F (37.2 °C)  Heart Rate:  [70-93] 91  Resp:  [16-20] 18  BP: (102-127)/(63-91) 127/91      Physical Exam:  Constitutional: No acute distress, awake, alert, seated in chair  HENT: NCAT, mucous membranes moist  Respiratory: Clear to auscultation bilaterally, respiratory effort normal   Cardiovascular: RRR, no murmurs, rubs, or gallops, palpable pedal pulses bilaterally  Gastrointestinal: Positive bowel sounds, soft, nontender, nondistended  Musculoskeletal: No bilateral ankle edema  Psychiatric: Appropriate affect, cooperative  Neurologic: Oriented x 3, strength symmetric in all extremities, Cranial Nerves grossly intact to confrontation, speech clear  Skin: No rashes    Results Reviewed:  I have personally reviewed current lab, radiology, and data and agree.      Results from last 7 days  Lab Units 18  1308   WBC 10*3/mm3 7.35   HEMOGLOBIN g/dL 14.1   HEMATOCRIT % 42.7   PLATELETS 10*3/mm3 230       Results from last 7 days  Lab Units 18  0744 18  0632 18  1405 18  1308   SODIUM mmol/L 140 140  --  142   POTASSIUM mmol/L 3.9 4.2  --  4.6   CHLORIDE mmol/L 110* 105  --  107   CO2 mmol/L 23.0 25.0  --  27.0   BUN mg/dL 10 18  --  15   CREATININE mg/dL 0.69 1.26  --  1.07   GLUCOSE mg/dL 113* 96  --  102*   CALCIUM mg/dL 8.0*  8.7  --  9.5   ALT (SGPT) U/L  --   --   --  28   AST (SGOT) U/L  --   --   --  24   TROPONIN I ng/mL  --   --  <0.006 <0.006     Estimated Creatinine Clearance: 83.6 mL/min (by C-G formula based on SCr of 0.69 mg/dL).  No results found for: BNP    Microbiology Results Abnormal     Procedure Component Value - Date/Time    Urine Culture - Urine, [[id number]]  (Abnormal)  (Susceptibility) Collected:  07/13/18 1636    Lab Status:  Final result Specimen:  Urine from Urine, Clean Catch Updated:  07/15/18 1032     Urine Culture >100,000 CFU/mL Escherichia coli (A)    Susceptibility      Escherichia coli     CHAMP     Ampicillin <=8 ug/ml Susceptible     Ampicillin + Sulbactam <=8/4 ug/ml Susceptible     Aztreonam <=8 ug/ml Susceptible     Cefepime <=8 ug/ml Susceptible     Cefotaxime <=2 ug/ml Susceptible     Ceftriaxone <=8 ug/ml Susceptible     Cefuroxime sodium <=4 ug/ml Susceptible     Cephalothin 16 ug/ml Intermediate     Ertapenem <=1 ug/ml Susceptible     Gentamicin <=4 ug/ml Susceptible     Levofloxacin <=2 ug/ml Susceptible     Meropenem <=1 ug/ml Susceptible     Nitrofurantoin <=32 ug/ml Susceptible     Piperacillin + Tazobactam <=16 ug/ml Susceptible     Tetracycline <=4 ug/ml Susceptible     Tobramycin <=4 ug/ml Susceptible     Trimethoprim + Sulfamethoxazole <=2/38 ug/ml Susceptible                          Results for orders placed during the hospital encounter of 07/11/18   Adult Transthoracic Echo Complete W/ Cont if Necessary Per Protocol    Narrative · Left ventricular systolic function is normal. Estimated EF = 65%.  · Left ventricular wall thickness is consistent with mild concentric   hypertrophy.  · Left ventricular diastolic dysfunction (grade II) consistent with   pseudonormalization.  · Estimated right ventricular systolic pressure from tricuspid   regurgitation is normal (<35 mmHg).          I have reviewed the medications.    Assessment/Plan   Assessment / Plan     Hospital Problem List     *  (Principal)Acute on chronic hypoxemic respiratory failure not requiring ventilatory support    Mixed anxiety depressive disorder    Atrial fibrillation (CMS/HCC)    COPD (chronic obstructive pulmonary disease) (CMS/HCC)    Type 2 diabetes mellitus (CMS/HCC)    Dyslipidemia    Hypertension    Pulmonary nodule right lower lobe medially (2 x 1.5 cm).    Overview Signed 5/22/2017  1:30 PM by Janine Mckeon PA-C     Description: A.  CT scan of the chest February 11 thousand 16 reports a noncalcified 8.5-9 mm nodule in the medial portion of the right lower lobe.         Rheumatoid arthritis (CMS/HCC)    Invasive ductal carcinoma of breast, left (CMS/HCC)    Chronic hypoxemic respiratory failure (CMS/HCC)    Overview Signed 7/12/2018  2:01 PM by Blade Taylor MD     2Lnc chronically         Dyspnea    Atypical chest pain    Acute on chronic diastolic CHF (congestive heart failure) (CMS/HCC)    Constipation          Brief Hospital Course to date:  Betsy Chi is a 62 y.o. female w/ metastatic breast cancer, copd, chronic 2Lnc use, ? Hx afib, dm2 ra presented as direct admisison from dr. centeno office for multiple complaints including diffuse pain, dyspnea.      Assessment & Plan:  - Abdominal pain and vomiting, etiology unknown, continue supportive therapy as below. IVF, antiemetics  - BRET, suspect this is pre-renal, sec to n/v, Cr baseline is 0.7-0.8, 1.26 today, continue IVF  - UA suggestive of UTI, started on merrem, urine cultures growing Ecoli, will consult pharmacy on abx  - Metastatic breast CA , Dr Centeno following, continue letrozole, s/p zometa 7/12, plan to start ibrance once she returns home  - Suspected dysphagia, ?pharyngeal ,SLP consulted to evaluate, continue protonix  - Acute respiratory failure, suspect volume overload, echo normal ef, diastolic dysfunction, ct angio no pulm embolism, continue prn diuresis, O2 supp, hold on steroids  - Back pain, suspect this is sec to bony mets, palliative  consulted and asisting  - Cards & heme-onc & palliative following (no ischemic evaluation, treat medically), suspected Afib per cards notes (data deficient)  - Continue bowel regimen (miralax bid, senokot bid, prn dulcolax) as constipation likely causing some discomfort  - steroid cream to rash low back     DVT Prophylaxis: [facility]     CODE STATUS:   Code Status and Medical Interventions:   Ordered at: 07/11/18 1254     Level Of Support Discussed With:    Patient     Code Status:    CPR     Medical Interventions (Level of Support Prior to Arrest):    Full     Disposition: anticipate d/c in 1- 2 days    Electronically signed by Michael Muniz MD, 07/15/18, 11:33 AM.

## 2018-07-15 NOTE — PLAN OF CARE
"Problem: Patient Care Overview  Goal: Interprofessional Rounds/Family Conf  Outcome: Ongoing (interventions implemented as appropriate)  Palliative Team Conference: LEVI Maldonado RN, PN; GARETT Lock, PRAFUL   07/15/18 1504   Interdisciplinary Rounds/Family Conf   Summary Pt crying in pain at time of RN encounter. Reviewed pain med available; after another discussion about Fentanyl synthetic property, and reduction of dose to minimize possible reaction, pt verbalized that she \"may\" decide to try it after all. Discussed with primary RN; pt has Benadryl prn on board.       Problem: Palliative Care (Adult)  Intervention: Minimize Discomfort   07/15/18 1504   Promote Oxygenation/Perfusion   Pain Management Interventions pain management plan reviewed with patient/caregiver  (pt afraid to try medication for pain due to allergies)           "

## 2018-07-15 NOTE — PLAN OF CARE
Problem: Patient Care Overview  Goal: Plan of Care Review  Outcome: Ongoing (interventions implemented as appropriate)   07/15/18 8735   Coping/Psychosocial   Plan of Care Reviewed With patient   Plan of Care Review   Progress no change       Problem: Fall Risk (Adult)  Goal: Absence of Fall  Outcome: Ongoing (interventions implemented as appropriate)   07/15/18 1413   Fall Risk (Adult)   Absence of Fall making progress toward outcome

## 2018-07-16 LAB
GLUCOSE BLDC GLUCOMTR-MCNC: 106 MG/DL (ref 70–130)
GLUCOSE BLDC GLUCOMTR-MCNC: 114 MG/DL (ref 70–130)
GLUCOSE BLDC GLUCOMTR-MCNC: 122 MG/DL (ref 70–130)
GLUCOSE BLDC GLUCOMTR-MCNC: 99 MG/DL (ref 70–130)

## 2018-07-16 PROCEDURE — 25010000002 MEROPENEM: Performed by: NURSE PRACTITIONER

## 2018-07-16 PROCEDURE — 63710000001 DRONABINOL PER 2.5 MG: Performed by: FAMILY MEDICINE

## 2018-07-16 PROCEDURE — 94760 N-INVAS EAR/PLS OXIMETRY 1: CPT

## 2018-07-16 PROCEDURE — 94799 UNLISTED PULMONARY SVC/PX: CPT

## 2018-07-16 PROCEDURE — 25010000002 FENTANYL CITRATE (PF) 100 MCG/2ML SOLUTION: Performed by: NURSE PRACTITIONER

## 2018-07-16 PROCEDURE — 25010000002 DIPHENHYDRAMINE PER 50 MG: Performed by: INTERNAL MEDICINE

## 2018-07-16 PROCEDURE — 82962 GLUCOSE BLOOD TEST: CPT

## 2018-07-16 PROCEDURE — 99232 SBSQ HOSP IP/OBS MODERATE 35: CPT | Performed by: INTERNAL MEDICINE

## 2018-07-16 PROCEDURE — 25010000002 HEPARIN (PORCINE) PER 1000 UNITS: Performed by: FAMILY MEDICINE

## 2018-07-16 RX ORDER — DRONABINOL 2.5 MG/1
5 CAPSULE ORAL 2 TIMES DAILY
Status: DISCONTINUED | OUTPATIENT
Start: 2018-07-16 | End: 2018-07-24

## 2018-07-16 RX ORDER — CEFDINIR 300 MG/1
300 CAPSULE ORAL EVERY 12 HOURS SCHEDULED
Status: COMPLETED | OUTPATIENT
Start: 2018-07-16 | End: 2018-07-20

## 2018-07-16 RX ORDER — LORAZEPAM 0.5 MG/1
0.5 TABLET ORAL EVERY 8 HOURS SCHEDULED
Status: DISCONTINUED | OUTPATIENT
Start: 2018-07-16 | End: 2018-07-24 | Stop reason: HOSPADM

## 2018-07-16 RX ADMIN — TRIAMCINOLONE ACETONIDE: 1 CREAM TOPICAL at 08:39

## 2018-07-16 RX ADMIN — PANTOPRAZOLE SODIUM 40 MG: 40 TABLET, DELAYED RELEASE ORAL at 06:09

## 2018-07-16 RX ADMIN — IPRATROPIUM BROMIDE AND ALBUTEROL SULFATE 3 ML: 2.5; .5 SOLUTION RESPIRATORY (INHALATION) at 16:17

## 2018-07-16 RX ADMIN — IPRATROPIUM BROMIDE AND ALBUTEROL SULFATE 3 ML: 2.5; .5 SOLUTION RESPIRATORY (INHALATION) at 18:46

## 2018-07-16 RX ADMIN — HEPARIN SODIUM 5000 UNITS: 5000 INJECTION, SOLUTION INTRAVENOUS; SUBCUTANEOUS at 14:08

## 2018-07-16 RX ADMIN — DIPHENHYDRAMINE HYDROCHLORIDE 25 MG: 50 INJECTION INTRAMUSCULAR; INTRAVENOUS at 14:08

## 2018-07-16 RX ADMIN — HEPARIN SODIUM 5000 UNITS: 5000 INJECTION, SOLUTION INTRAVENOUS; SUBCUTANEOUS at 06:09

## 2018-07-16 RX ADMIN — DRONABINOL 5 MG: 2.5 CAPSULE ORAL at 21:18

## 2018-07-16 RX ADMIN — SENNOSIDES AND DOCUSATE SODIUM 2 TABLET: 8.6; 5 TABLET ORAL at 08:39

## 2018-07-16 RX ADMIN — CARVEDILOL 3.12 MG: 3.12 TABLET, FILM COATED ORAL at 08:39

## 2018-07-16 RX ADMIN — CEFDINIR 300 MG: 300 CAPSULE ORAL at 21:18

## 2018-07-16 RX ADMIN — IPRATROPIUM BROMIDE AND ALBUTEROL SULFATE 3 ML: 2.5; .5 SOLUTION RESPIRATORY (INHALATION) at 12:53

## 2018-07-16 RX ADMIN — CARVEDILOL 3.12 MG: 3.12 TABLET, FILM COATED ORAL at 17:26

## 2018-07-16 RX ADMIN — DIPHENHYDRAMINE HYDROCHLORIDE 25 MG: 50 INJECTION INTRAMUSCULAR; INTRAVENOUS at 08:40

## 2018-07-16 RX ADMIN — LORAZEPAM 0.5 MG: 0.5 TABLET ORAL at 14:08

## 2018-07-16 RX ADMIN — LORAZEPAM 0.5 MG: 0.5 TABLET ORAL at 00:53

## 2018-07-16 RX ADMIN — IPRATROPIUM BROMIDE AND ALBUTEROL SULFATE 3 ML: 2.5; .5 SOLUTION RESPIRATORY (INHALATION) at 02:46

## 2018-07-16 RX ADMIN — IPRATROPIUM BROMIDE AND ALBUTEROL SULFATE 3 ML: 2.5; .5 SOLUTION RESPIRATORY (INHALATION) at 23:27

## 2018-07-16 RX ADMIN — ATORVASTATIN CALCIUM 20 MG: 20 TABLET, FILM COATED ORAL at 21:18

## 2018-07-16 RX ADMIN — SENNOSIDES AND DOCUSATE SODIUM 2 TABLET: 8.6; 5 TABLET ORAL at 21:18

## 2018-07-16 RX ADMIN — LORAZEPAM 0.5 MG: 0.5 TABLET ORAL at 21:19

## 2018-07-16 RX ADMIN — IPRATROPIUM BROMIDE AND ALBUTEROL SULFATE 3 ML: 2.5; .5 SOLUTION RESPIRATORY (INHALATION) at 07:09

## 2018-07-16 RX ADMIN — TRIAMCINOLONE ACETONIDE: 1 CREAM TOPICAL at 21:20

## 2018-07-16 RX ADMIN — FENTANYL CITRATE 12.5 MCG: 50 INJECTION INTRAMUSCULAR; INTRAVENOUS at 08:40

## 2018-07-16 RX ADMIN — DIPHENHYDRAMINE HYDROCHLORIDE 25 MG: 50 INJECTION INTRAMUSCULAR; INTRAVENOUS at 21:19

## 2018-07-16 RX ADMIN — LORAZEPAM 0.5 MG: 0.5 TABLET ORAL at 15:15

## 2018-07-16 RX ADMIN — LETROZOLE 2.5 MG: 2.5 TABLET, FILM COATED ORAL at 08:40

## 2018-07-16 RX ADMIN — DRONABINOL 5 MG: 2.5 CAPSULE ORAL at 14:08

## 2018-07-16 RX ADMIN — MEROPENEM 1 G: 1 INJECTION, POWDER, FOR SOLUTION INTRAVENOUS at 00:44

## 2018-07-16 RX ADMIN — HEPARIN SODIUM 5000 UNITS: 5000 INJECTION, SOLUTION INTRAVENOUS; SUBCUTANEOUS at 21:19

## 2018-07-16 RX ADMIN — CEFDINIR 300 MG: 300 CAPSULE ORAL at 09:53

## 2018-07-16 NOTE — PROGRESS NOTES
[facility] Medicine Services  PROGRESS NOTE    Patient Name: Betsy Chi  : 1955  MRN: [id number]    Date of Admission: 2018  Length of Stay: 5  Primary Care Physician: PRAFUL Wheatley    Subjective   Subjective   Late entry note.    CC:n/v and UTI    HPI:No acute events overnight, patient continues to endorse generalized pain unable to really localize it.    Review of Systems  Gen- No fevers, chills  CV- No chest pain, palpitations  Resp- No cough, dyspnea  GI- No N/V/D, abd pain    Otherwise ROS is negative except as mentioned in the HPI.    Objective   Objective     Vital Signs:   Temp:  [97.9 °F (36.6 °C)-98.5 °F (36.9 °C)] 98.1 °F (36.7 °C)  Heart Rate:  [70-91] 70  Resp:  [16-18] 18  BP: ()/(50-71) 90/50        Physical Exam:  Constitutional: No acute distress, awake, alert  HENT: NCAT, mucous membranes moist  Respiratory: Clear to auscultation bilaterally, respiratory effort normal   Cardiovascular: RRR, no murmurs, rubs, or gallops, palpable pedal pulses bilaterally  Gastrointestinal: Positive bowel sounds, soft, nontender, nondistended  Musculoskeletal: No bilateral ankle edema  Psychiatric: Appropriate affect, cooperative  Neurologic: Oriented x 3, strength symmetric in all extremities, Cranial Nerves grossly intact to confrontation, speech clear  Skin: No rashes    Results Reviewed:  I have personally reviewed current lab, radiology, and data and agree.      Results from last 7 days  Lab Units 18  1308   WBC 10*3/mm3 7.35   HEMOGLOBIN g/dL 14.1   HEMATOCRIT % 42.7   PLATELETS 10*3/mm3 230       Results from last 7 days  Lab Units 18  0744 18  0632 18  1405 18  1308   SODIUM mmol/L 140 140  --  142   POTASSIUM mmol/L 3.9 4.2  --  4.6   CHLORIDE mmol/L 110* 105  --  107   CO2 mmol/L 23.0 25.0  --  27.0   BUN mg/dL 10 18  --  15   CREATININE mg/dL 0.69 1.26  --  1.07   GLUCOSE mg/dL 113* 96  --  102*   CALCIUM mg/dL 8.0* 8.7   --  9.5   ALT (SGPT) U/L  --   --   --  28   AST (SGOT) U/L  --   --   --  24   TROPONIN I ng/mL  --   --  <0.006 <0.006     Estimated Creatinine Clearance: 83.6 mL/min (by C-G formula based on SCr of 0.69 mg/dL).  No results found for: BNP    Microbiology Results Abnormal     Procedure Component Value - Date/Time    Urine Culture - Urine, [[id number]]  (Abnormal)  (Susceptibility) Collected:  07/13/18 1636    Lab Status:  Final result Specimen:  Urine from Urine, Clean Catch Updated:  07/15/18 1032     Urine Culture >100,000 CFU/mL Escherichia coli (A)    Susceptibility      Escherichia coli     CHAMP     Ampicillin <=8 ug/ml Susceptible     Ampicillin + Sulbactam <=8/4 ug/ml Susceptible     Aztreonam <=8 ug/ml Susceptible     Cefepime <=8 ug/ml Susceptible     Cefotaxime <=2 ug/ml Susceptible     Ceftriaxone <=8 ug/ml Susceptible     Cefuroxime sodium <=4 ug/ml Susceptible     Cephalothin 16 ug/ml Intermediate     Ertapenem <=1 ug/ml Susceptible     Gentamicin <=4 ug/ml Susceptible     Levofloxacin <=2 ug/ml Susceptible     Meropenem <=1 ug/ml Susceptible     Nitrofurantoin <=32 ug/ml Susceptible     Piperacillin + Tazobactam <=16 ug/ml Susceptible     Tetracycline <=4 ug/ml Susceptible     Tobramycin <=4 ug/ml Susceptible     Trimethoprim + Sulfamethoxazole <=2/38 ug/ml Susceptible                        Results for orders placed during the hospital encounter of 07/11/18   Adult Transthoracic Echo Complete W/ Cont if Necessary Per Protocol    Narrative · Left ventricular systolic function is normal. Estimated EF = 65%.  · Left ventricular wall thickness is consistent with mild concentric   hypertrophy.  · Left ventricular diastolic dysfunction (grade II) consistent with   pseudonormalization.  · Estimated right ventricular systolic pressure from tricuspid   regurgitation is normal (<35 mmHg).          I have reviewed the medications.    Assessment/Plan   Assessment / Plan     Hospital Problem List     *  (Principal)Acute on chronic hypoxemic respiratory failure not requiring ventilatory support    Mixed anxiety depressive disorder    Atrial fibrillation (CMS/HCC)    COPD (chronic obstructive pulmonary disease) (CMS/HCC)    Type 2 diabetes mellitus (CMS/HCC)    Dyslipidemia    Hypertension    Pulmonary nodule right lower lobe medially (2 x 1.5 cm).    Overview Signed 5/22/2017  1:30 PM by Janine Mckeon PA-C     Description: A.  CT scan of the chest February 11 thousand 16 reports a noncalcified 8.5-9 mm nodule in the medial portion of the right lower lobe.         Rheumatoid arthritis (CMS/HCC)    Invasive ductal carcinoma of breast, left (CMS/HCC)    Chronic hypoxemic respiratory failure (CMS/HCC)    Overview Signed 7/12/2018  2:01 PM by Blade Taylor MD     2Lnc chronically         Dyspnea    Atypical chest pain    Acute on chronic diastolic CHF (congestive heart failure) (CMS/HCC)    Constipation         Brief Hospital Course to date:  Betsy Chi is a 62 y.o. female w/ metastatic breast cancer, copd, chronic 2Lnc use, ? Hx afib, dm2 ra presented as direct admisison from dr. centeno office for multiple complaints including diffuse pain, dyspnea.      Assessment & Plan:  - Abdominal pain and vomiting, etiology unknown, continue supportive therapy as below. IVF, antiemetics  - BRET, suspect this is pre-renal, sec to n/v, Cr baseline is 0.7-0.8, 1.26 today, continue IVF  - UA suggestive of UTI, started on merrem, urine cultures growing Ecoli, now on cefdinir  - Metastatic breast CA , Dr Centeno following, discussed her pain issue, will consult palliative to assist, continue letrozole, s/p zometa 7/12, plan to start ibrance once she returns home  - Suspected dysphagia, ?pharyngeal ,SLP consulted to evaluate, continue protonix  - Acute respiratory failure, suspect volume overload, echo normal ef, diastolic dysfunction, ct angio no pulm embolism, continue prn diuresis, O2 supp, hold on steroids  - Back pain,  suspect this is sec to bony mets, palliative consulted and asisting  - Cards & heme-onc & palliative following (no ischemic evaluation, treat medically), suspected Afib per cards notes (data deficient)  - Continue bowel regimen (miralax bid, senokot bid, prn dulcolax) as constipation likely causing some discomfort  - steroid cream to rash low back     DVT Prophylaxis: [facility]      CODE STATUS:   Code Status and Medical Interventions:   Ordered at: 07/11/18 1254     Level Of Support Discussed With:    Patient     Code Status:    CPR     Medical Interventions (Level of Support Prior to Arrest):    Full     Disposition: anticipate d/c in 1-2 days    Electronically signed by Michael Muniz MD, 07/16/18, 8:31 PM.

## 2018-07-16 NOTE — PAYOR COMM NOTE
"Kera Chi (62 y.o. Female)   Auth # [id number]  Nguyen Singleton RN, BSN  Phone # [phone]  Fax # [phone]    Date of Birth Social Security Number Address Home Phone MRN    1955  [address] [facility] [phone] [id number]    Episcopal Marital Status          [employer]ally        Admission Date Admission Type Admitting Provider Attending Provider Department, Room/Bed    7/11/18 Elective Michael Muniz MD Opii, [place], MD [facility] 6B, N625/1    Discharge Date Discharge Disposition Discharge Destination                       Attending Provider:  Michael Muniz MD    Allergies:  Ampicillin, Erythromycin, Morphine And Related, Oxycodone-acetaminophen, Oxycodone-aspirin, Penicillins, Sulfa Antibiotics, Aspirin, Codeine, Contrast Dye, Ibuprofen, Latex, Propoxyphene, Saccharin, Tramadol    Isolation:  None   Infection:  None   Code Status:  CPR    Ht:  160 cm (62.99\")   Wt:  82.1 kg (181 lb)    Admission Cmt:  None   Principal Problem:  Acute on chronic hypoxemic respiratory failure not requiring ventilatory support [J96.91]                 Active Insurance as of 7/11/2018     Primary Coverage     Payor Plan Insurance Group Employer/Plan Group    PASSPORT PASSPORT MEDICAID     Payor Plan Address Payor Plan Phone Number Effective From Effective To    [address] [phone] 11/1/1997     [facility] [address]       Subscriber Name Subscriber Birth Date Member ID       KERA CHI 1955 [id number]                 Emergency Contacts      (Rel.) Home Phone Work Phone Mobile Phone    Fely Stringer (Relative) [phone] -- --    Camacho Stringer (Son) -- -- [phone]    Niya Stringer (Grandchild) [phone] -- --             Physician Progress Notes (last 72 hours) (Notes from 7/13/2018 11:25 AM through 7/16/2018 11:25 AM)      Mary Mendoza MD at 7/16/2018 10:41 AM            Subjective     PROBLEM LIST:  1. " [id number] invasive ductal carcinoma of the left breast, ER+, WA+, Her2 negative  A) presented with a palpable mass in the left breast as well as skin thickening.  biopsy showed multifocal low grade IDC.  Skin biopsy negative.  B) PET/CT 6/6/18 showed widespread bony metastatic disease  2. History of right breast cancer 2001, treated with lumpectomy and SLNB, ? CMF, and adjuvant hormonal therapy  3. Anxiety/depression  4. Rheumatoid arthritis  5. Asthma  6. CHF  7. COPD  8. Diabetes mellitus type 2  9. GERD  10. HTN  11. HL  12. CVA  13. Tobacco abuse  14. choriocarcinoma  15. Fibromyalgia  16. Atrial fibrillation    INTERVAL HISTORY: swallow evaluation unremarkable.  Pain control has been challenging given intolerance of multiple medications.  She says the pain medicines have helped with pain, but she reacts to most things.  She developed hives after fentanyl yesterday.  IV benadryl helps.    REVIEW OF SYSTEMS:  A 14 point review of systems was performed and is negative except as noted above.      Objective     Vitals:    07/15/18 2243 07/16/18 0246 07/16/18 0325 07/16/18 0709   BP:   110/70 108/65   BP Location:   Right arm    Patient Position:   Lying    Pulse: 88 88 75 80   Resp: 16 16 16 16   Temp:   97.9 °F (36.6 °C) 98.2 °F (36.8 °C)   TempSrc:   Oral Oral   SpO2: 96% 95%     Weight:       Height:             General:  female in no acute distress  Neuro: alert and oriented  HEENT: sclera anicteric, oropharynx clear  Lymphatics: no cervical, supraclavicular, or axillary adenopathy  Cardiovascular: regular rate and rhythm, no murmurs  Lungs: clear to auscultation bilaterally  Abdomen: soft, nontender, nondistended.  No palpable organomegaly  Extremeties: no lower extremity edema  Psych: mood and affect appropriate            Assessment/Plan     Betsy Chi is a 62 y.o. year old female with metastatic breast cancer, admitted for pain management, dyspnea, evaluation of abdominal pain/vomiting.     Metastatic  breast cancer: continue letrozole. Start ibrance after she returns home.  Next zometa due in 3 months.     Abdominal pain/vomiting: speech/swallow evaluation unremarkable.    She is keeping down most of her food.  Etiology of vomiting is unclear.    Pain: c/o back pain, likely 2/2 bony mets but she has had a hard time localizing.  Prn fentanyl has been ordered - received a dose yesterday evening but reported hives afterwards.  Appreciate palliative care assistance.    UTI: on cefdinir    Home soon - unclear if she will tolerate any analgesic medications.             Mary Mendoza MD  2018          Electronically signed by Mary Mendoza MD at 2018 10:44 AM     Michael Muniz MD at 7/15/2018 11:27 AM              [facility] Medicine Services  PROGRESS NOTE    Patient Name: Betsy Chi  : 1955  MRN: [id number]    Date of Admission: 2018  Length of Stay: 4  Primary Care Physician: PRAFUL Wheatley    Subjective   Subjective     CC: abd pain, n/v    HPI:No acute events overnight, patient states that she had a good night, and is doing well, nausea still there.    Review of Systems  Gen- No fevers, chills  CV- No chest pain, palpitations  Resp- No cough, dyspnea  GI- No N/V/D, abd pain    Otherwise ROS is negative except as mentioned in the HPI.    Objective   Objective     Vital Signs:   Temp:  [97.9 °F (36.6 °C)-99 °F (37.2 °C)] 99 °F (37.2 °C)  Heart Rate:  [70-93] 91  Resp:  [16-20] 18  BP: (102-127)/(63-91) 127/91      Physical Exam:  Constitutional: No acute distress, awake, alert, seated in chair  HENT: NCAT, mucous membranes moist  Respiratory: Clear to auscultation bilaterally, respiratory effort normal   Cardiovascular: RRR, no murmurs, rubs, or gallops, palpable pedal pulses bilaterally  Gastrointestinal: Positive bowel sounds, soft, nontender, nondistended  Musculoskeletal: No bilateral ankle edema  Psychiatric: Appropriate affect, cooperative  Neurologic:  Oriented x 3, strength symmetric in all extremities, Cranial Nerves grossly intact to confrontation, speech clear  Skin: No rashes    Results Reviewed:  I have personally reviewed current lab, radiology, and data and agree.      Results from last 7 days  Lab Units 07/11/18  1308   WBC 10*3/mm3 7.35   HEMOGLOBIN g/dL 14.1   HEMATOCRIT % 42.7   PLATELETS 10*3/mm3 230       Results from last 7 days  Lab Units 07/14/18  0744 07/13/18  0632 07/11/18  1405 07/11/18  1308   SODIUM mmol/L 140 140  --  142   POTASSIUM mmol/L 3.9 4.2  --  4.6   CHLORIDE mmol/L 110* 105  --  107   CO2 mmol/L 23.0 25.0  --  27.0   BUN mg/dL 10 18  --  15   CREATININE mg/dL 0.69 1.26  --  1.07   GLUCOSE mg/dL 113* 96  --  102*   CALCIUM mg/dL 8.0* 8.7  --  9.5   ALT (SGPT) U/L  --   --   --  28   AST (SGOT) U/L  --   --   --  24   TROPONIN I ng/mL  --   --  <0.006 <0.006     Estimated Creatinine Clearance: 83.6 mL/min (by C-G formula based on SCr of 0.69 mg/dL).  No results found for: BNP    Microbiology Results Abnormal     Procedure Component Value - Date/Time    Urine Culture - Urine, [[id number]]  (Abnormal)  (Susceptibility) Collected:  07/13/18 1636    Lab Status:  Final result Specimen:  Urine from Urine, Clean Catch Updated:  07/15/18 1032     Urine Culture >100,000 CFU/mL Escherichia coli (A)    Susceptibility      Escherichia coli     CHAMP     Ampicillin <=8 ug/ml Susceptible     Ampicillin + Sulbactam <=8/4 ug/ml Susceptible     Aztreonam <=8 ug/ml Susceptible     Cefepime <=8 ug/ml Susceptible     Cefotaxime <=2 ug/ml Susceptible     Ceftriaxone <=8 ug/ml Susceptible     Cefuroxime sodium <=4 ug/ml Susceptible     Cephalothin 16 ug/ml Intermediate     Ertapenem <=1 ug/ml Susceptible     Gentamicin <=4 ug/ml Susceptible     Levofloxacin <=2 ug/ml Susceptible     Meropenem <=1 ug/ml Susceptible     Nitrofurantoin <=32 ug/ml Susceptible     Piperacillin + Tazobactam <=16 ug/ml Susceptible     Tetracycline <=4 ug/ml Susceptible      Tobramycin <=4 ug/ml Susceptible     Trimethoprim + Sulfamethoxazole <=2/38 ug/ml Susceptible                          Results for orders placed during the hospital encounter of 07/11/18   Adult Transthoracic Echo Complete W/ Cont if Necessary Per Protocol    Narrative · Left ventricular systolic function is normal. Estimated EF = 65%.  · Left ventricular wall thickness is consistent with mild concentric   hypertrophy.  · Left ventricular diastolic dysfunction (grade II) consistent with   pseudonormalization.  · Estimated right ventricular systolic pressure from tricuspid   regurgitation is normal (<35 mmHg).          I have reviewed the medications.    Assessment/Plan   Assessment / Plan     Hospital Problem List     * (Principal)Acute on chronic hypoxemic respiratory failure not requiring ventilatory support    Mixed anxiety depressive disorder    Atrial fibrillation (CMS/HCC)    COPD (chronic obstructive pulmonary disease) (CMS/HCC)    Type 2 diabetes mellitus (CMS/HCC)    Dyslipidemia    Hypertension    Pulmonary nodule right lower lobe medially (2 x 1.5 cm).    Overview Signed 5/22/2017  1:30 PM by Janine Mckeon PA-C     Description: A.  CT scan of the chest February 11 thousand 16 reports a noncalcified 8.5-9 mm nodule in the medial portion of the right lower lobe.         Rheumatoid arthritis (CMS/HCC)    Invasive ductal carcinoma of breast, left (CMS/HCC)    Chronic hypoxemic respiratory failure (CMS/HCC)    Overview Signed 7/12/2018  2:01 PM by Blade Taylor MD     2Lnc chronically         Dyspnea    Atypical chest pain    Acute on chronic diastolic CHF (congestive heart failure) (CMS/HCC)    Constipation          Brief Hospital Course to date:  Betsy Chi is a 62 y.o. female w/ metastatic breast cancer, copd, chronic 2Lnc use, ? Hx afib, dm2 ra presented as direct admisison from dr. centeno office for multiple complaints including diffuse pain, dyspnea.      Assessment & Plan:  - Abdominal  pain and vomiting, etiology unknown, continue supportive therapy as below. IVF, antiemetics  - BRET, suspect this is pre-renal, sec to n/v, Cr baseline is 0.7-0.8, 1.26 today, continue IVF  - UA suggestive of UTI, started on merrem, urine cultures growing Ecoli, will consult pharmacy on abx  - Metastatic breast CA , Dr Mendoza following, continue letrozole, s/p zometa , plan to start ibrance once she returns home  - Suspected dysphagia, ?pharyngeal ,SLP consulted to evaluate, continue protonix  - Acute respiratory failure, suspect volume overload, echo normal ef, diastolic dysfunction, ct angio no pulm embolism, continue prn diuresis, O2 supp, hold on steroids  - Back pain, suspect this is sec to bony mets, palliative consulted and asisting  - Cards & heme-onc & palliative following (no ischemic evaluation, treat medically), suspected Afib per cards notes (data deficient)  - Continue bowel regimen (miralax bid, senokot bid, prn dulcolax) as constipation likely causing some discomfort  - steroid cream to rash low back     DVT Prophylaxis: [facility]     CODE STATUS:   Code Status and Medical Interventions:   Ordered at: 18 1254     Level Of Support Discussed With:    Patient     Code Status:    CPR     Medical Interventions (Level of Support Prior to Arrest):    Full     Disposition: anticipate d/c in 1- 2 days    Electronically signed by Micheal Muniz MD, 07/15/18, 11:33 AM.      Electronically signed by Michael Muniz MD at 7/15/2018 11:33 AM     Michael Muniz MD at 2018 11:24 AM              [facility] Medicine Services  PROGRESS NOTE    Patient Name: Betsy Chi  : 1955  MRN: [id number]    Date of Admission: 2018  Length of Stay: 3  Primary Care Physician: PRAFUL Wheatley    Subjective   Subjective     CC:abd pain , n/v    HPI: No acute events overnight, patient states that she is feeling better     Review of Systems  Gen- No fevers, chills  CV- No chest pain,  palpitations  Resp- No cough, dyspnea  GI- No N/V/D, abd pain    Otherwise ROS is negative except as mentioned in the HPI.    Objective   Objective     Vital Signs:   Temp:  [97.7 °F (36.5 °C)-98.5 °F (36.9 °C)] 97.9 °F (36.6 °C)  Heart Rate:  [] 80  Resp:  [16-18] 18  BP: (106-137)/(60-83) 106/60      Physical Exam:  Constitutional: No acute distress, awake, alert  HENT: NCAT, mucous membranes moist  Respiratory: Clear to auscultation bilaterally, respiratory effort normal   Cardiovascular: RRR, no murmurs, rubs, or gallops, palpable pedal pulses bilaterally  Gastrointestinal: Positive bowel sounds, soft, nontender, nondistended  Musculoskeletal: No bilateral ankle edema  Psychiatric: Appropriate affect, cooperative  Neurologic: Oriented x 3, strength symmetric in all extremities, Cranial Nerves grossly intact to confrontation, speech clear  Skin: No rashes    Results Reviewed:  I have personally reviewed current lab, radiology, and data and agree.      Results from last 7 days  Lab Units 07/11/18  1308   WBC 10*3/mm3 7.35   HEMOGLOBIN g/dL 14.1   HEMATOCRIT % 42.7   PLATELETS 10*3/mm3 230       Results from last 7 days  Lab Units 07/14/18  0744 07/13/18  0632 07/11/18  1405 07/11/18  1308   SODIUM mmol/L 140 140  --  142   POTASSIUM mmol/L 3.9 4.2  --  4.6   CHLORIDE mmol/L 110* 105  --  107   CO2 mmol/L 23.0 25.0  --  27.0   BUN mg/dL 10 18  --  15   CREATININE mg/dL 0.69 1.26  --  1.07   GLUCOSE mg/dL 113* 96  --  102*   CALCIUM mg/dL 8.0* 8.7  --  9.5   ALT (SGPT) U/L  --   --   --  28   AST (SGOT) U/L  --   --   --  24   TROPONIN I ng/mL  --   --  <0.006 <0.006     Estimated Creatinine Clearance: 83.6 mL/min (by C-G formula based on SCr of 0.69 mg/dL).  BNP   Date Value Ref Range Status   07/11/2018 60.0 0.0 - 100.0 pg/mL Final     Comment:     Results may be falsely decreased if patient taking Biotin.       Imaging Results (last 24 hours)     Procedure Component Value Units Date/Time    Fiberoptic Endo  (fees) [[id number]] Resulted:  07/13/18 1214     Updated:  07/13/18 1214    Narrative:       This procedure was auto-finalized with no dictation required.        Results for orders placed during the hospital encounter of 07/11/18   Adult Transthoracic Echo Complete W/ Cont if Necessary Per Protocol    Narrative · Left ventricular systolic function is normal. Estimated EF = 65%.  · Left ventricular wall thickness is consistent with mild concentric   hypertrophy.  · Left ventricular diastolic dysfunction (grade II) consistent with   pseudonormalization.  · Estimated right ventricular systolic pressure from tricuspid   regurgitation is normal (<35 mmHg).          I have reviewed the medications.    Assessment/Plan   Assessment / Plan     Hospital Problem List     * (Principal)Acute on chronic hypoxemic respiratory failure not requiring ventilatory support    Mixed anxiety depressive disorder    Atrial fibrillation (CMS/HCC)    COPD (chronic obstructive pulmonary disease) (CMS/HCC)    Type 2 diabetes mellitus (CMS/HCC)    Dyslipidemia    Hypertension    Pulmonary nodule right lower lobe medially (2 x 1.5 cm).    Overview Signed 5/22/2017  1:30 PM by Janine Mckeon PA-C     Description: A.  CT scan of the chest February 11 thousand 16 reports a noncalcified 8.5-9 mm nodule in the medial portion of the right lower lobe.         Rheumatoid arthritis (CMS/HCC)    Invasive ductal carcinoma of breast, left (CMS/HCC)    Chronic hypoxemic respiratory failure (CMS/HCC)    Overview Signed 7/12/2018  2:01 PM by Blade Taylor MD     2Lnc chronically         Dyspnea    Atypical chest pain    Acute on chronic diastolic CHF (congestive heart failure) (CMS/HCC)    Constipation           Brief Hospital Course to date:  Betsy Chi is a 62 y.o. female w/ metastatic breast cancer, copd, chronic 2Lnc use, ? Hx afib, dm2 ra presented as direct admisison from dr. centeno office for multiple complaints including diffuse pain,  dyspnea.      Assessment & Plan:  - Abdominal pain and vomiting, etiology unknown, continue supportive therapy as below. IVF, antiemetics  - BRET, suspect this is pre-renal, sec to n/v, Cr baseline is 0.7-0.8, 1.26 today, continue IVF  - UA suggestive of UTI, started on merrem, f/u urine cultures  - Metastatic breast CA , Dr Mendoza following, continue letrozole, s/p zometa 7/12, plan to start ibrance once she returns home  - Suspected dysphagia, ?pharyngeal ,SLP consulted to evaluate, continue protonix  - Acute respiratory failure, suspect volume overload, echo normal ef, diastolic dysfunction, ct angio no pulm embolism, continue prn diuresis, O2 supp, hold on steroids  - Back pain, suspect this is sec to bony mets, palliative consulted and asisting  - Cards & heme-onc & palliative following (no ischemic evaluation, treat medically), suspected Afib per cards notes (data deficient)  - Continue bowel regimen (miralax bid, senokot bid, prn dulcolax) as constipation likely causing some discomfort  - steroid cream to rash low back     DVT Prophylaxis: [facility]      CODE STATUS:   Code Status and Medical Interventions:   Ordered at: 07/11/18 1254     Level Of Support Discussed With:    Patient     Code Status:    CPR     Medical Interventions (Level of Support Prior to Arrest):    Full     Disposition:anticipate d/c in 1-2 days    Electronically signed by Michael Muniz MD, 07/14/18, 11:28 AM.      Electronically signed by Michael Muniz MD at 7/14/2018 11:28 AM     Nadir Ojeda DO at 7/13/2018  1:40 PM          Palliative Care Progress Note    Date of Admission: 7/11/2018    Subjective:  Patient states that she continues to have significant amount of pain which is occurring in her lower back area, as well as her hip areas.  Current Code Status     Date Active Code Status Order ID Comments User Context       7/11/2018 12:54 PM CPR [id number]  Ania De Leon DO Inpatient       Questions for Current Code Status     Question  "Answer Comment    Code Status CPR     Medical Interventions (Level of Support Prior to Arrest) Full     Level Of Support Discussed With Patient         No current facility-administered medications on file prior to encounter.      Current Outpatient Prescriptions on File Prior to Encounter   Medication Sig Dispense Refill   • albuterol (PROVENTIL HFA;VENTOLIN HFA) 108 (90 Base) MCG/ACT inhaler Inhale 2 puffs Every 4 (Four) Hours As Needed for Wheezing. 1 inhaler 2   • aluminum acetate (DOMEBORO) pack packet Apply  topically 3 (Three) Times a Day As Needed (psoriatic arthritis). 100 each 6   • amLODIPine (NORVASC) 10 MG tablet TAKE ONE TABLET BY MOUTH DAILY 30 tablet 0   • atorvastatin (LIPITOR) 20 MG tablet Take 1 tablet by mouth Every Night. 90 tablet 0   • carvedilol (COREG) 12.5 MG tablet TAKE ONE TABLET BY MOUTH TWICE A DAY WITH MEALS 60 tablet 0   • Cranberry 600 MG tablet Take 1 tablet by mouth Daily.     • glucose blood test strip Use as instructed 100 each 12   • glucose monitor monitoring kit 1 each 3 (Three) Times a Day As Needed (hypo/hyperglycemia). 1 each 3   • Lancets (ACCU-CHEK SOFT TOUCH) lancets Three times daily and as needed 100 each 12   • letrozole (FEMARA) 2.5 MG tablet Take 1 tablet by mouth Daily. 30 tablet 11   • ondansetron (ZOFRAN) 8 MG tablet Take 1 tablet by mouth 3 (Three) Times a Day As Needed for Nausea or Vomiting. 30 tablet 5   • pantoprazole (PROTONIX) 40 MG EC tablet Take 1 tablet by mouth Daily. 90 tablet 2       sodium chloride 75 mL/hr Last Rate: 75 mL/hr (07/13/18 1122)     •  acetaminophen  •  bisacodyl  •  dextrose  •  dextrose  •  diphenhydrAMINE **OR** diphenhydrAMINE  •  fentaNYL citrate (PF)  •  glucagon (human recombinant)  •  LORazepam  •  ondansetron  •  polyethylene glycol  •  sodium chloride    Objective: /67   Pulse 92   Temp 97.9 °F (36.6 °C) (Oral)   Resp 18   Ht 160 cm (62.99\")   Wt 82.1 kg (181 lb)   SpO2 92%   BMI 32.07 kg/m²       Intake/Output " Summary (Last 24 hours) at 07/13/18 1340  Last data filed at 07/13/18 1300   Gross per 24 hour   Intake             1900 ml   Output             1550 ml   Net              350 ml     Physical Exam:      General Appearance:    Alert, cooperative, At times she is tearful during the exam    Head:    Normocephalic, without obvious abnormality, atraumatic   Eyes:            Lids and lashes normal, conjunctivae and sclerae normal, no   icterus, no pallor, corneas clear, PERRLA   Ears:    Ears appear intact with no abnormalities noted   Throat:   No oral lesions, no thrush, oral mucosa moist   Neck:   No adenopathy, supple, trachea midline, no thyromegaly, no     carotid bruit, no JVD   Back:     No kyphosis present, no scoliosis present, no skin lesions,       erythema or scars, no tenderness to percussion or                   palpation,   range of motion normal   Lungs:     Clear to auscultation,respirations regular, even and                   unlabored    Heart:    Regular rhythm and normal rate, normal S1 and S2, no            murmur, no gallop, no rub, no click   Breast Exam:    Deferred   Abdomen:     Normal bowel sounds, no masses, no organomegaly, soft        non-tender, non-distended, no guarding, no rebound                 tenderness   Genitalia:    Deferred   Extremities:   Moves all extremities well, no edema, no cyanosis, no              redness   Pulses:   Pulses palpable and equal bilaterally   Skin:   No bleeding, bruising or rash   Lymph nodes:   No palpable adenopathy   Neurologic:   Cranial nerves 2 - 12 grossly intact, sensation intact, DTR        present and equal bilaterally       Results from last 7 days  Lab Units 07/11/18  1308   WBC 10*3/mm3 7.35   HEMOGLOBIN g/dL 14.1   HEMATOCRIT % 42.7   PLATELETS 10*3/mm3 230       Results from last 7 days  Lab Units 07/13/18  0632 07/11/18  1308   SODIUM mmol/L 140 142   POTASSIUM mmol/L 4.2 4.6   CHLORIDE mmol/L 105 107   CO2 mmol/L 25.0 27.0   BUN mg/dL 18  15   CREATININE mg/dL 1.26 1.07   CALCIUM mg/dL 8.7 9.5   BILIRUBIN mg/dL  --  0.8   ALK PHOS U/L  --  138*   ALT (SGPT) U/L  --  28   AST (SGOT) U/L  --  24   GLUCOSE mg/dL 96 102*       Impression: Breast cancer with bone metastasis  Dysphagia  Dyspnea  Goals of care  Plan: This is deftly very difficult case as the patient has apparent anaphylactic reactions to a large amount of medicines.  I did discuss with pharmacy and we will try to use fentanyl for pain control as it is a synthetic drug.  I would like to keep the patient in the hospital at least the next day to couple of days to see how she is going to react to the medication.  Patient herself seems to be somewhat dubious about whether she will try to medicine or not so I strongly encouraged her to.        Nadir Ojeda DO  07/13/18  1:40 PM        Electronically signed by Nadir Ojeda DO at 7/13/2018  1:42 PM       Ida Espinoza RN Registered Nurse Signed Case Management Progress Notes Date of Service: 7/12/2018  2:56 PM         []Manual[]Template  []Copied  Discharge Planning Assessment  [facility]     Patient Name: Betsy Chi                 MRN: [id number]  Today's Date: 7/12/2018                     Admit Date: 7/11/2018                  Discharge Needs Assessment      Row Name 07/12/18 1446           Living Environment     Lives With other (see comments)   her nephew     Current Living Arrangements home/apartment/condo     Primary Care Provided by self     Provides Primary Care For no one     Family Caregiver if Needed other relative(s)     Quality of Family Relationships involved;supportive     Able to Return to Prior Arrangements yes           Resource/Environmental Concerns     Resource/Environmental Concerns none           Transition Planning     Patient/Family Anticipates Transition to home with family     Patient/Family Anticipated Services at Transition      Transportation Anticipated family or friend will provide            Discharge Needs Assessment     Readmission Within the Last 30 Days no previous admission in last 30 days     Concerns to be Addressed discharge planning     Equipment Currently Used at Home oxygen;rollator;cane, straight   oxygen through [employer]     Anticipated Changes Related to Illness none                              Discharge Plan      Row Name 07/12/18 1448           Plan     Plan Home     Patient/Family in Agreement with Plan yes     Plan Comments Spoke with patient in room to initiate discharge planning.  She lives with her nephew in [facility].  Prior to admission, she was independent with ADL's, using a rollator to assist with ambulation.  She has never had home health services and has home oxygen through [employer].  Ms. Chi has RX coverage and has her scripts filled at [facility] in [place].  Her plan is to return home to her nephew's at discharge.  CM will continue to follow.  Ida Espinoza RN [phone]                  Destination      No service coordination in this encounter.               Durable Medical Equipment      No service coordination in this encounter.               Dialysis/Infusion      No service coordination in this encounter.               Home Medical Care      No service coordination in this encounter.               Social Care      No service coordination in this encounter.                               Demographic Summary      Row Name 07/12/18 1448           General Information     Admission Type inpatient     Arrived From physician office     Referral Source admission list     Reason for Consult discharge planning     Preferred Language English      Used During This Interaction no     General Information Comments PCP- Freddy Maldonado                              Functional Status      Row Name 07/12/18 1440           Functional Status     Usual Activity Tolerance fair     Current Activity Tolerance fair            Functional Status, IADL     Medications independent     Meal Preparation assistive equipment and person     Housekeeping assistive equipment and person     Laundry assistive equipment and person     Shopping assistive equipment and person               Psychosocial    No documentation.              Abuse/Neglect    No documentation.                        Legal      Row Name 07/12/18 1053           Financial/Legal     Finance Comments Verified with patient that she has Passport.  No issues obtaining medications.               Substance Abuse    No documentation.              Patient Forms    No documentation.            Ida Espinoza RN

## 2018-07-16 NOTE — PROGRESS NOTES
Subjective     PROBLEM LIST:  1. [id number] invasive ductal carcinoma of the left breast, ER+, IN+, Her2 negative  A) presented with a palpable mass in the left breast as well as skin thickening.  biopsy showed multifocal low grade IDC.  Skin biopsy negative.  B) PET/CT 6/6/18 showed widespread bony metastatic disease  2. History of right breast cancer 2001, treated with lumpectomy and SLNB, ? CMF, and adjuvant hormonal therapy  3. Anxiety/depression  4. Rheumatoid arthritis  5. Asthma  6. CHF  7. COPD  8. Diabetes mellitus type 2  9. GERD  10. HTN  11. HL  12. CVA  13. Tobacco abuse  14. choriocarcinoma  15. Fibromyalgia  16. Atrial fibrillation    INTERVAL HISTORY: swallow evaluation unremarkable.  Pain control has been challenging given intolerance of multiple medications.  She says the pain medicines have helped with pain, but she reacts to most things.  She developed hives after fentanyl yesterday.  IV benadryl helps.    REVIEW OF SYSTEMS:  A 14 point review of systems was performed and is negative except as noted above.      Objective     Vitals:    07/15/18 2243 07/16/18 0246 07/16/18 0325 07/16/18 0709   BP:   110/70 108/65   BP Location:   Right arm    Patient Position:   Lying    Pulse: 88 88 75 80   Resp: 16 16 16 16   Temp:   97.9 °F (36.6 °C) 98.2 °F (36.8 °C)   TempSrc:   Oral Oral   SpO2: 96% 95%     Weight:       Height:             General:  female in no acute distress  Neuro: alert and oriented  HEENT: sclera anicteric, oropharynx clear  Lymphatics: no cervical, supraclavicular, or axillary adenopathy  Cardiovascular: regular rate and rhythm, no murmurs  Lungs: clear to auscultation bilaterally  Abdomen: soft, nontender, nondistended.  No palpable organomegaly  Extremeties: no lower extremity edema  Psych: mood and affect appropriate            Assessment/Plan     Betsy Chi is a 62 y.o. year old female with metastatic breast cancer, admitted for pain management, dyspnea, evaluation of  abdominal pain/vomiting.     Metastatic breast cancer: continue letrozole. Start ibrance after she returns home.  Next zometa due in 3 months.     Abdominal pain/vomiting: speech/swallow evaluation unremarkable.    She is keeping down most of her food.  Etiology of vomiting is unclear.    Pain: c/o back pain, likely 2/2 bony mets but she has had a hard time localizing.  Prn fentanyl has been ordered - received a dose yesterday evening but reported hives afterwards.  Appreciate palliative care assistance.    UTI: on cefdinir    Home soon - unclear if she will tolerate any analgesic medications.              Mary Mendoza MD  7/16/2018

## 2018-07-16 NOTE — PROGRESS NOTES
Palliative Care Progress Note    Date of Admission: 7/11/2018    Subjective:  Still complaining of pain.  States that she has started to develop hives after getting her second dose of fentanyl.  Current Code Status     Date Active Code Status Order ID Comments User Context       7/11/2018 12:54 PM CPR [id number]  Ania De Leon, DO Inpatient       Questions for Current Code Status     Question Answer Comment    Code Status CPR     Medical Interventions (Level of Support Prior to Arrest) Full     Level Of Support Discussed With Patient         No current facility-administered medications on file prior to encounter.      Current Outpatient Prescriptions on File Prior to Encounter   Medication Sig Dispense Refill   • albuterol (PROVENTIL HFA;VENTOLIN HFA) 108 (90 Base) MCG/ACT inhaler Inhale 2 puffs Every 4 (Four) Hours As Needed for Wheezing. 1 inhaler 2   • aluminum acetate (DOMEBORO) pack packet Apply  topically 3 (Three) Times a Day As Needed (psoriatic arthritis). 100 each 6   • amLODIPine (NORVASC) 10 MG tablet TAKE ONE TABLET BY MOUTH DAILY 30 tablet 0   • atorvastatin (LIPITOR) 20 MG tablet Take 1 tablet by mouth Every Night. 90 tablet 0   • carvedilol (COREG) 12.5 MG tablet TAKE ONE TABLET BY MOUTH TWICE A DAY WITH MEALS 60 tablet 0   • Cranberry 600 MG tablet Take 1 tablet by mouth Daily.     • glucose blood test strip Use as instructed 100 each 12   • glucose monitor monitoring kit 1 each 3 (Three) Times a Day As Needed (hypo/hyperglycemia). 1 each 3   • Lancets (ACCU-CHEK SOFT TOUCH) lancets Three times daily and as needed 100 each 12   • letrozole (FEMARA) 2.5 MG tablet Take 1 tablet by mouth Daily. 30 tablet 11   • ondansetron (ZOFRAN) 8 MG tablet Take 1 tablet by mouth 3 (Three) Times a Day As Needed for Nausea or Vomiting. 30 tablet 5   • pantoprazole (PROTONIX) 40 MG EC tablet Take 1 tablet by mouth Daily. 90 tablet 2        •  acetaminophen  •  bisacodyl  •  dextrose  •  dextrose  •  diphenhydrAMINE  "**OR** diphenhydrAMINE  •  glucagon (human recombinant)  •  LORazepam  •  ondansetron  •  polyethylene glycol  •  sodium chloride    Objective: /70   Pulse 73   Temp 98 °F (36.7 °C) (Oral)   Resp 16   Ht 160 cm (62.99\")   Wt 82.1 kg (181 lb)   SpO2 95%   BMI 32.07 kg/m²      Intake/Output Summary (Last 24 hours) at 07/16/18 1242  Last data filed at 07/16/18 0900   Gross per 24 hour   Intake              460 ml   Output             1000 ml   Net             -540 ml     Physical Exam:      General Appearance:    Alert, cooperative, At times she is tearful during the exam    Head:    Normocephalic, without obvious abnormality, atraumatic   Eyes:            Lids and lashes normal, conjunctivae and sclerae normal, no   icterus, no pallor, corneas clear, PERRLA   Ears:    Ears appear intact with no abnormalities noted   Throat:   No oral lesions, no thrush, oral mucosa moist   Neck:   No adenopathy, supple, trachea midline, no thyromegaly, no     carotid bruit, no JVD   Back:     No kyphosis present, no scoliosis present, no skin lesions,       erythema or scars, no tenderness to percussion or                   palpation,   range of motion normal   Lungs:     Clear to auscultation,respirations regular, even and                   unlabored    Heart:    Regular rhythm and normal rate, normal S1 and S2, no            murmur, no gallop, no rub, no click   Breast Exam:    Deferred   Abdomen:     Normal bowel sounds, no masses, no organomegaly, soft        non-tender, non-distended, no guarding, no rebound                 tenderness   Genitalia:    Deferred   Extremities:   Moves all extremities well, no edema, no cyanosis, no              redness   Pulses:   Pulses palpable and equal bilaterally   Skin:   No bleeding, bruising or rash   Lymph nodes:   No palpable adenopathy   Neurologic:   Cranial nerves 2 - 12 grossly intact, sensation intact, DTR        present and equal bilaterally       Results from last 7 " days  Lab Units 07/11/18  1308   WBC 10*3/mm3 7.35   HEMOGLOBIN g/dL 14.1   HEMATOCRIT % 42.7   PLATELETS 10*3/mm3 230       Results from last 7 days  Lab Units 07/14/18  0744  07/11/18  1308   SODIUM mmol/L 140  < > 142   POTASSIUM mmol/L 3.9  < > 4.6   CHLORIDE mmol/L 110*  < > 107   CO2 mmol/L 23.0  < > 27.0   BUN mg/dL 10  < > 15   CREATININE mg/dL 0.69  < > 1.07   CALCIUM mg/dL 8.0*  < > 9.5   BILIRUBIN mg/dL  --   --  0.8   ALK PHOS U/L  --   --  138*   ALT (SGPT) U/L  --   --  28   AST (SGOT) U/L  --   --  24   GLUCOSE mg/dL 113*  < > 102*   < > = values in this interval not displayed.    Impression: Breast cancer with bone metastasis  Dysphagia  Dyspnea  Goals of care  Plan:  Unfortunately options for symptom management seem to be somewhat limited at this point time.  I will add scheduled Marinol to see if this will help with the patient's pain.  Patient is okay to be discharged from a palliative medicine standpoint.        Nadir Ojeda,   07/16/18  12:42 PM

## 2018-07-16 NOTE — PLAN OF CARE
Problem: Patient Care Overview  Goal: Plan of Care Review  Outcome: Ongoing (interventions implemented as appropriate)   07/16/18 7062   Coping/Psychosocial   Plan of Care Reviewed With patient   Plan of Care Review   Progress no change   OTHER   Outcome Summary Pt up ad charbel and interventions utilized to maintain skin integrity. IV benadryl appeared effective in treating rash/hives. Pt appears to have rested sporadically through the night with no significant events noted. VSS, maintaining in normal sinus on 2L humidified nasal cannula.

## 2018-07-16 NOTE — PLAN OF CARE
Problem: Patient Care Overview  Goal: Interprofessional Rounds/Family Conf  Outcome: Ongoing (interventions implemented as appropriate)  13:00 Palliative Team Conference: LEVI Maldonado RN, CHPN; SAVI Hoffman, CHPN; BRIAN Ojeda DO; PRAFUL Ahmadi   07/16/18 1409   Interdisciplinary Rounds/Family Conf   Summary Pt reported reaction of hives/itching after administration of Fentanyl yesterday, relieved with IV Benadryl. Pt reported massage therapy helped her relax and her pain seemed less today; will continue Integrative Therapy interventions.        Problem: Palliative Care (Adult)  Intervention: Minimize Discomfort   07/16/18 1409   Promote Oxygenation/Perfusion   Pain Management Interventions pain management plan reviewed with patient/caregiver     Intervention: Optimize Function   07/16/18 1409   Musculoskeletal Interventions   Fatigue Management paced activity encouraged     Intervention: Support/Optimize Psychosocial Response   07/16/18 1409   Coping/Psychosocial Interventions   Supportive Measures active listening utilized;decision-making supported;positive reinforcement provided;self-responsibility promoted;self-reflection promoted;self-care encouraged;verbalization of feelings encouraged       Goal: Maximized Comfort  Outcome: Ongoing (interventions implemented as appropriate)   07/16/18 1409   Palliative Care (Adult)   Maximized Comfort making progress toward outcome     Goal: Enhanced Quality of Life  Outcome: Ongoing (interventions implemented as appropriate)   07/16/18 1409   Palliative Care (Adult)   Enhanced Quality of Life making progress toward outcome

## 2018-07-16 NOTE — PROGRESS NOTES
Continued Stay Note   Lul     Patient Name: Betsy Chi  MRN: [id number]  Today's Date: 7/16/2018    Admit Date: 7/11/2018          Discharge Plan     Row Name 07/16/18 1349       Plan    Plan Home     Patient/Family in Agreement with Plan yes    Plan Comments Spoke with patient at bedside - She has home oxygen with [employer] - I have called them to bring her a portable tank and requested that they visit her at home as well to be sure her home set up is functioning properly - patient stated concerns and requested humidification - Portable tank will be delivered to bedside today - CM following - [phone]    Final Discharge Disposition Code 01 - home or self-care              Discharge Codes    No documentation.           Isatu Murcia RN

## 2018-07-17 LAB
GLUCOSE BLDC GLUCOMTR-MCNC: 100 MG/DL (ref 70–130)
GLUCOSE BLDC GLUCOMTR-MCNC: 107 MG/DL (ref 70–130)
GLUCOSE BLDC GLUCOMTR-MCNC: 138 MG/DL (ref 70–130)
GLUCOSE BLDC GLUCOMTR-MCNC: 81 MG/DL (ref 70–130)

## 2018-07-17 PROCEDURE — 82962 GLUCOSE BLOOD TEST: CPT

## 2018-07-17 PROCEDURE — 94760 N-INVAS EAR/PLS OXIMETRY 1: CPT

## 2018-07-17 PROCEDURE — 25010000002 DIPHENHYDRAMINE PER 50 MG: Performed by: INTERNAL MEDICINE

## 2018-07-17 PROCEDURE — 63710000001 DRONABINOL PER 2.5 MG: Performed by: FAMILY MEDICINE

## 2018-07-17 PROCEDURE — 94799 UNLISTED PULMONARY SVC/PX: CPT

## 2018-07-17 PROCEDURE — 25010000002 HEPARIN (PORCINE) PER 1000 UNITS: Performed by: FAMILY MEDICINE

## 2018-07-17 PROCEDURE — 94640 AIRWAY INHALATION TREATMENT: CPT

## 2018-07-17 PROCEDURE — 99232 SBSQ HOSP IP/OBS MODERATE 35: CPT | Performed by: INTERNAL MEDICINE

## 2018-07-17 RX ORDER — ALENDRONATE SODIUM 70 MG/1
70 TABLET ORAL WEEKLY
Status: DISCONTINUED | OUTPATIENT
Start: 2018-07-17 | End: 2018-07-17

## 2018-07-17 RX ADMIN — LORAZEPAM 0.5 MG: 0.5 TABLET ORAL at 14:56

## 2018-07-17 RX ADMIN — LORAZEPAM 0.5 MG: 0.5 TABLET ORAL at 05:40

## 2018-07-17 RX ADMIN — IPRATROPIUM BROMIDE AND ALBUTEROL SULFATE 3 ML: 2.5; .5 SOLUTION RESPIRATORY (INHALATION) at 07:10

## 2018-07-17 RX ADMIN — IPRATROPIUM BROMIDE AND ALBUTEROL SULFATE 3 ML: 2.5; .5 SOLUTION RESPIRATORY (INHALATION) at 22:59

## 2018-07-17 RX ADMIN — DIPHENHYDRAMINE HYDROCHLORIDE 25 MG: 50 INJECTION INTRAMUSCULAR; INTRAVENOUS at 08:17

## 2018-07-17 RX ADMIN — PANTOPRAZOLE SODIUM 40 MG: 40 TABLET, DELAYED RELEASE ORAL at 05:40

## 2018-07-17 RX ADMIN — IPRATROPIUM BROMIDE AND ALBUTEROL SULFATE 3 ML: 2.5; .5 SOLUTION RESPIRATORY (INHALATION) at 19:20

## 2018-07-17 RX ADMIN — CEFDINIR 300 MG: 300 CAPSULE ORAL at 21:31

## 2018-07-17 RX ADMIN — DRONABINOL 5 MG: 2.5 CAPSULE ORAL at 08:23

## 2018-07-17 RX ADMIN — SENNOSIDES AND DOCUSATE SODIUM 2 TABLET: 8.6; 5 TABLET ORAL at 21:31

## 2018-07-17 RX ADMIN — LETROZOLE 2.5 MG: 2.5 TABLET, FILM COATED ORAL at 08:17

## 2018-07-17 RX ADMIN — DRONABINOL 5 MG: 2.5 CAPSULE ORAL at 21:31

## 2018-07-17 RX ADMIN — IPRATROPIUM BROMIDE AND ALBUTEROL SULFATE 3 ML: 2.5; .5 SOLUTION RESPIRATORY (INHALATION) at 03:00

## 2018-07-17 RX ADMIN — IPRATROPIUM BROMIDE AND ALBUTEROL SULFATE 3 ML: 2.5; .5 SOLUTION RESPIRATORY (INHALATION) at 12:24

## 2018-07-17 RX ADMIN — TRIAMCINOLONE ACETONIDE: 1 CREAM TOPICAL at 08:17

## 2018-07-17 RX ADMIN — ALENDRONATE SODIUM 70 MG: 70 TABLET ORAL at 14:56

## 2018-07-17 RX ADMIN — HEPARIN SODIUM 5000 UNITS: 5000 INJECTION, SOLUTION INTRAVENOUS; SUBCUTANEOUS at 05:40

## 2018-07-17 RX ADMIN — IPRATROPIUM BROMIDE AND ALBUTEROL SULFATE 3 ML: 2.5; .5 SOLUTION RESPIRATORY (INHALATION) at 15:43

## 2018-07-17 RX ADMIN — CARVEDILOL 3.12 MG: 3.12 TABLET, FILM COATED ORAL at 17:25

## 2018-07-17 RX ADMIN — HEPARIN SODIUM 5000 UNITS: 5000 INJECTION, SOLUTION INTRAVENOUS; SUBCUTANEOUS at 21:31

## 2018-07-17 RX ADMIN — LORAZEPAM 0.5 MG: 0.5 TABLET ORAL at 08:17

## 2018-07-17 RX ADMIN — SENNOSIDES AND DOCUSATE SODIUM 2 TABLET: 8.6; 5 TABLET ORAL at 08:24

## 2018-07-17 RX ADMIN — CARVEDILOL 3.12 MG: 3.12 TABLET, FILM COATED ORAL at 08:17

## 2018-07-17 RX ADMIN — LORAZEPAM 0.5 MG: 0.5 TABLET ORAL at 21:31

## 2018-07-17 RX ADMIN — LORAZEPAM 0.5 MG: 0.5 TABLET ORAL at 02:22

## 2018-07-17 RX ADMIN — CEFDINIR 300 MG: 300 CAPSULE ORAL at 08:17

## 2018-07-17 RX ADMIN — DIPHENHYDRAMINE HYDROCHLORIDE 25 MG: 50 INJECTION INTRAMUSCULAR; INTRAVENOUS at 02:22

## 2018-07-17 RX ADMIN — ATORVASTATIN CALCIUM 20 MG: 20 TABLET, FILM COATED ORAL at 21:31

## 2018-07-17 RX ADMIN — HEPARIN SODIUM 5000 UNITS: 5000 INJECTION, SOLUTION INTRAVENOUS; SUBCUTANEOUS at 14:56

## 2018-07-17 RX ADMIN — TRIAMCINOLONE ACETONIDE: 1 CREAM TOPICAL at 21:31

## 2018-07-17 NOTE — PROGRESS NOTES
Continued Stay Note  [facility]     Patient Name: Betsy Chi  MRN: [id number]  Today's Date: 7/17/2018    Admit Date: 7/11/2018    Consent obtained for the participation in the [facility] Program. Richelle Sampson RN        Discharge Plan    No documentation.             Discharge Codes    No documentation.           Richelle Sampson RN

## 2018-07-17 NOTE — PLAN OF CARE
Problem: Patient Care Overview  Goal: Interprofessional Rounds/Family Conf  Outcome: Ongoing (interventions implemented as appropriate)  13:00 Palliative Team Conference: LEVI Maldonado RN, CHPN; SAVI Hoffman, CHPN; GARETT Mcfarland, APRN; AVE Velasquez, [facility], [facility]   07/17/18 1449   Interdisciplinary Rounds/Family Conf   Summary Pt reported feeling overall better today than any time recently; feels calm, and pain is not as bothersome. Ativan scheduled + prn, Dronabinol.       Problem: Palliative Care (Adult)  Intervention: Minimize Discomfort   07/17/18 1449   Promote Oxygenation/Perfusion   Pain Management Interventions pain management plan reviewed with patient/caregiver   Coping Strategies   Complementary Therapy massage therapy     Intervention: Optimize Function   07/17/18 1449   Musculoskeletal Interventions   Fatigue Management paced activity encouraged     Intervention: Support/Optimize Psychosocial Response   07/17/18 1449   Coping/Psychosocial Interventions   Supportive Measures active listening utilized;self-responsibility promoted       Goal: Maximized Comfort  Outcome: Ongoing (interventions implemented as appropriate)   07/17/18 1449   Palliative Care (Adult)   Maximized Comfort making progress toward outcome     Goal: Enhanced Quality of Life  Outcome: Ongoing (interventions implemented as appropriate)   07/17/18 1449   Palliative Care (Adult)   Enhanced Quality of Life making progress toward outcome

## 2018-07-17 NOTE — PROGRESS NOTES
[facility] Medicine Services  PROGRESS NOTE    Patient Name: Betsy Chi  : 1955  MRN: [id number]    Date of Admission: 2018  Length of Stay: 6  Primary Care Physician: PRAFUL Wheatley    Subjective   Subjective     CC: f/u n/v and UTI    HPI:Patient had a rough night, states that she did not sleep well, still having generalized pain, she is getting a massage this morning, nausea still present.    Review of Systems  Gen- No fevers, chills  CV- No chest pain, palpitations  Resp- No cough, dyspnea  GI-  No V/D, abd pain, +nausea    Otherwise ROS is negative except as mentioned in the HPI.    Objective   Objective     Vital Signs:   Temp:  [98 °F (36.7 °C)-98.7 °F (37.1 °C)] 98.7 °F (37.1 °C)  Heart Rate:  [70-88] 79  Resp:  [16-18] 18  BP: ()/(50-71) 115/69        Physical Exam:  Constitutional: No acute distress, awake, alert, uncomfortable  HENT: NCAT, mucous membranes moist  Respiratory: Clear to auscultation bilaterally, respiratory effort normal   Cardiovascular: RRR, no murmurs, rubs, or gallops, palpable pedal pulses bilaterally  Gastrointestinal: Positive bowel sounds, soft, nontender, nondistended  Musculoskeletal: No bilateral ankle edema  Psychiatric: Appropriate affect, cooperative  Neurologic: Oriented x 3, strength symmetric in all extremities, Cranial Nerves grossly intact to confrontation, speech clear  Skin: No rashes    Results Reviewed:  I have personally reviewed current lab, radiology, and data and agree.      Results from last 7 days  Lab Units 18  1308   WBC 10*3/mm3 7.35   HEMOGLOBIN g/dL 14.1   HEMATOCRIT % 42.7   PLATELETS 10*3/mm3 230       Results from last 7 days  Lab Units 18  0744 18  0632 18  1405 18  1308   SODIUM mmol/L 140 140  --  142   POTASSIUM mmol/L 3.9 4.2  --  4.6   CHLORIDE mmol/L 110* 105  --  107   CO2 mmol/L 23.0 25.0  --  27.0   BUN mg/dL 10 18  --  15   CREATININE mg/dL 0.69 1.26  --  1.07    GLUCOSE mg/dL 113* 96  --  102*   CALCIUM mg/dL 8.0* 8.7  --  9.5   ALT (SGPT) U/L  --   --   --  28   AST (SGOT) U/L  --   --   --  24   TROPONIN I ng/mL  --   --  <0.006 <0.006     Estimated Creatinine Clearance: 83.6 mL/min (by C-G formula based on SCr of 0.69 mg/dL).  No results found for: BNP    Microbiology Results Abnormal     Procedure Component Value - Date/Time    Urine Culture - Urine, [[id number]]  (Abnormal)  (Susceptibility) Collected:  07/13/18 1636    Lab Status:  Final result Specimen:  Urine from Urine, Clean Catch Updated:  07/15/18 1032     Urine Culture >100,000 CFU/mL Escherichia coli (A)    Susceptibility      Escherichia coli     CHAMP     Ampicillin <=8 ug/ml Susceptible     Ampicillin + Sulbactam <=8/4 ug/ml Susceptible     Aztreonam <=8 ug/ml Susceptible     Cefepime <=8 ug/ml Susceptible     Cefotaxime <=2 ug/ml Susceptible     Ceftriaxone <=8 ug/ml Susceptible     Cefuroxime sodium <=4 ug/ml Susceptible     Cephalothin 16 ug/ml Intermediate     Ertapenem <=1 ug/ml Susceptible     Gentamicin <=4 ug/ml Susceptible     Levofloxacin <=2 ug/ml Susceptible     Meropenem <=1 ug/ml Susceptible     Nitrofurantoin <=32 ug/ml Susceptible     Piperacillin + Tazobactam <=16 ug/ml Susceptible     Tetracycline <=4 ug/ml Susceptible     Tobramycin <=4 ug/ml Susceptible     Trimethoprim + Sulfamethoxazole <=2/38 ug/ml Susceptible                        Results for orders placed during the hospital encounter of 07/11/18   Adult Transthoracic Echo Complete W/ Cont if Necessary Per Protocol    Narrative · Left ventricular systolic function is normal. Estimated EF = 65%.  · Left ventricular wall thickness is consistent with mild concentric   hypertrophy.  · Left ventricular diastolic dysfunction (grade II) consistent with   pseudonormalization.  · Estimated right ventricular systolic pressure from tricuspid   regurgitation is normal (<35 mmHg).          I have reviewed the medications.    Assessment/Plan    Assessment / Plan     Hospital Problem List     * (Principal)Acute on chronic hypoxemic respiratory failure not requiring ventilatory support    Mixed anxiety depressive disorder    Atrial fibrillation (CMS/HCC)    COPD (chronic obstructive pulmonary disease) (CMS/HCC)    Type 2 diabetes mellitus (CMS/HCC)    Dyslipidemia    Hypertension    Pulmonary nodule right lower lobe medially (2 x 1.5 cm).    Overview Signed 5/22/2017  1:30 PM by Janine Mckeon PA-C     Description: A.  CT scan of the chest February 11 thousand 16 reports a noncalcified 8.5-9 mm nodule in the medial portion of the right lower lobe.         Rheumatoid arthritis (CMS/HCC)    Invasive ductal carcinoma of breast, left (CMS/HCC)    Chronic hypoxemic respiratory failure (CMS/HCC)    Overview Signed 7/12/2018  2:01 PM by Blade Taylor MD     2Lnc chronically         Dyspnea    Atypical chest pain    Acute on chronic diastolic CHF (congestive heart failure) (CMS/HCC)    Constipation         Brief Hospital Course to date:  Betsy Chi is a 62 y.o. female w/ metastatic breast cancer, copd, chronic 2Lnc use, ? Hx afib, dm2 ra presented as direct admisison from dr. centeno office for multiple complaints including diffuse pain, dyspnea.      Assessment & Plan:  - Abdominal pain and vomiting, etiology unknown, continue supportive therapy as below. IVF, antiemetics  - BRET, suspect this is pre-renal, sec to n/v, Cr baseline is 0.7-0.8, 1.26 today, continue IVF  - Ecoli UTI, s/p merrem, now on cefdinir  - Metastatic breast CA , Dr Centeno following, discussed her pain issue, will consult palliative to assist, continue letrozole, s/p zometa 7/12, plan to start ibrance once she returns home  - Suspected dysphagia, ?pharyngeal ,SLP consulted to evaluate, continue protonix  - Acute respiratory failure, suspect volume overload, echo normal ef, diastolic dysfunction, ct angio no pulm embolism, continue prn diuresis, O2 supp, hold on steroids  - Back  pain, suspect this is sec to bony mets, palliative consulted and asisting  - Cards & heme-onc & palliative following (no ischemic evaluation, treat medically), suspected Afib per cards notes (data deficient)  - Continue bowel regimen (miralax bid, senokot bid, prn dulcolax) as constipation likely causing some discomfort  - steroid cream to rash low back     DVT Prophylaxis: [facility]     CODE STATUS:   Code Status and Medical Interventions:   Ordered at: 07/11/18 1254     Level Of Support Discussed With:    Patient     Code Status:    CPR     Medical Interventions (Level of Support Prior to Arrest):    Full       Disposition: anticipate d/c home once pain is reasonably controlled    Electronically signed by Michael Muniz MD, 07/17/18, 10:03 AM.

## 2018-07-17 NOTE — PROGRESS NOTES
Palliative Care Progress Note    Date of Admission: 7/11/2018    Subjective:  Still complaining of pain.  States that she has started to develop hives after getting her second dose of fentanyl.  Current Code Status     Date Active Code Status Order ID Comments User Context       7/11/2018 12:54 PM CPR [id number]  Ania De Leon, DO Inpatient       Questions for Current Code Status     Question Answer Comment    Code Status CPR     Medical Interventions (Level of Support Prior to Arrest) Full     Level Of Support Discussed With Patient         No current facility-administered medications on file prior to encounter.      Current Outpatient Prescriptions on File Prior to Encounter   Medication Sig Dispense Refill   • albuterol (PROVENTIL HFA;VENTOLIN HFA) 108 (90 Base) MCG/ACT inhaler Inhale 2 puffs Every 4 (Four) Hours As Needed for Wheezing. 1 inhaler 2   • aluminum acetate (DOMEBORO) pack packet Apply  topically 3 (Three) Times a Day As Needed (psoriatic arthritis). 100 each 6   • amLODIPine (NORVASC) 10 MG tablet TAKE ONE TABLET BY MOUTH DAILY 30 tablet 0   • atorvastatin (LIPITOR) 20 MG tablet Take 1 tablet by mouth Every Night. 90 tablet 0   • carvedilol (COREG) 12.5 MG tablet TAKE ONE TABLET BY MOUTH TWICE A DAY WITH MEALS 60 tablet 0   • Cranberry 600 MG tablet Take 1 tablet by mouth Daily.     • glucose blood test strip Use as instructed 100 each 12   • glucose monitor monitoring kit 1 each 3 (Three) Times a Day As Needed (hypo/hyperglycemia). 1 each 3   • Lancets (ACCU-CHEK SOFT TOUCH) lancets Three times daily and as needed 100 each 12   • letrozole (FEMARA) 2.5 MG tablet Take 1 tablet by mouth Daily. 30 tablet 11   • ondansetron (ZOFRAN) 8 MG tablet Take 1 tablet by mouth 3 (Three) Times a Day As Needed for Nausea or Vomiting. 30 tablet 5   • pantoprazole (PROTONIX) 40 MG EC tablet Take 1 tablet by mouth Daily. 90 tablet 2        •  acetaminophen  •  bisacodyl  •  dextrose  •  dextrose  •  diphenhydrAMINE  "**OR** diphenhydrAMINE  •  glucagon (human recombinant)  •  LORazepam  •  ondansetron  •  polyethylene glycol  •  sodium chloride    Objective: /58 (BP Location: Right arm, Patient Position: Lying)   Pulse 81   Temp 98.3 °F (36.8 °C) (Oral)   Resp 18   Ht 160 cm (62.99\")   Wt 82.1 kg (181 lb)   SpO2 96%   BMI 32.07 kg/m²      Intake/Output Summary (Last 24 hours) at 07/17/18 1408  Last data filed at 07/17/18 0800   Gross per 24 hour   Intake              240 ml   Output             1000 ml   Net             -760 ml     Physical Exam:      General Appearance:    Alert, cooperative, At times she is tearful during the exam    Head:    Normocephalic, without obvious abnormality, atraumatic   Eyes:            Lids and lashes normal, conjunctivae and sclerae normal, no   icterus, no pallor, corneas clear, PERRLA   Ears:    Ears appear intact with no abnormalities noted   Throat:   No oral lesions, no thrush, oral mucosa moist   Neck:   No adenopathy, supple, trachea midline, no thyromegaly, no     carotid bruit, no JVD   Back:     No kyphosis present, no scoliosis present, no skin lesions,       erythema or scars, no tenderness to percussion or                   palpation,   range of motion normal   Lungs:     Clear to auscultation,respirations regular, even and                   unlabored    Heart:    Regular rhythm and normal rate, normal S1 and S2, no            murmur, no gallop, no rub, no click   Breast Exam:    Deferred   Abdomen:     Normal bowel sounds, no masses, no organomegaly, soft        non-tender, non-distended, no guarding, no rebound                 tenderness   Genitalia:    Deferred   Extremities:   Moves all extremities well, no edema, no cyanosis, no              redness   Pulses:   Pulses palpable and equal bilaterally   Skin:   No bleeding, bruising or rash   Lymph nodes:   No palpable adenopathy   Neurologic:   Cranial nerves 2 - 12 grossly intact, sensation intact, DTR        present " and equal bilaterally       Results from last 7 days  Lab Units 07/11/18  1308   WBC 10*3/mm3 7.35   HEMOGLOBIN g/dL 14.1   HEMATOCRIT % 42.7   PLATELETS 10*3/mm3 230       Results from last 7 days  Lab Units 07/14/18  0744  07/11/18  1308   SODIUM mmol/L 140  < > 142   POTASSIUM mmol/L 3.9  < > 4.6   CHLORIDE mmol/L 110*  < > 107   CO2 mmol/L 23.0  < > 27.0   BUN mg/dL 10  < > 15   CREATININE mg/dL 0.69  < > 1.07   CALCIUM mg/dL 8.0*  < > 9.5   BILIRUBIN mg/dL  --   --  0.8   ALK PHOS U/L  --   --  138*   ALT (SGPT) U/L  --   --  28   AST (SGOT) U/L  --   --  24   GLUCOSE mg/dL 113*  < > 102*   < > = values in this interval not displayed.    Impression: Breast cancer with bone metastasis  Dysphagia  Dyspnea  Goals of care  Plan:  Patient is still somewhat hesitant about going home because of her pain.  With this in mind I will try to attempt to use a bisphosphonate to see this will help with the patient's metastatic bone pain.  If this does not work ketamine infusion may be the next option, but I am somewhat hesitant in doing this.        Nadir Ojeda,   07/17/18  2:08 PM

## 2018-07-17 NOTE — PLAN OF CARE
Problem: Patient Care Overview  Goal: Plan of Care Review  Outcome: Ongoing (interventions implemented as appropriate)   07/17/18 8232   Coping/Psychosocial   Plan of Care Reviewed With patient   Plan of Care Review   Progress improving   OTHER   Outcome Summary Vitals stable throughout shift. Pt rested well. IV replaced. Pain seems much more managed. Feet wrapped in xeroform, per pt request. K pad also helping with pain mgmt.

## 2018-07-18 LAB
GLUCOSE BLDC GLUCOMTR-MCNC: 106 MG/DL (ref 70–130)
GLUCOSE BLDC GLUCOMTR-MCNC: 106 MG/DL (ref 70–130)
GLUCOSE BLDC GLUCOMTR-MCNC: 91 MG/DL (ref 70–130)
GLUCOSE BLDC GLUCOMTR-MCNC: 92 MG/DL (ref 70–130)

## 2018-07-18 PROCEDURE — 99232 SBSQ HOSP IP/OBS MODERATE 35: CPT | Performed by: NURSE PRACTITIONER

## 2018-07-18 PROCEDURE — 63710000001 DRONABINOL PER 2.5 MG: Performed by: FAMILY MEDICINE

## 2018-07-18 PROCEDURE — 99232 SBSQ HOSP IP/OBS MODERATE 35: CPT | Performed by: INTERNAL MEDICINE

## 2018-07-18 PROCEDURE — 94799 UNLISTED PULMONARY SVC/PX: CPT

## 2018-07-18 PROCEDURE — 94640 AIRWAY INHALATION TREATMENT: CPT

## 2018-07-18 PROCEDURE — 94760 N-INVAS EAR/PLS OXIMETRY 1: CPT

## 2018-07-18 PROCEDURE — 25010000002 DIPHENHYDRAMINE PER 50 MG: Performed by: INTERNAL MEDICINE

## 2018-07-18 PROCEDURE — 25010000002 HEPARIN (PORCINE) PER 1000 UNITS: Performed by: FAMILY MEDICINE

## 2018-07-18 PROCEDURE — 82962 GLUCOSE BLOOD TEST: CPT

## 2018-07-18 RX ORDER — IPRATROPIUM BROMIDE AND ALBUTEROL SULFATE 2.5; .5 MG/3ML; MG/3ML
3 SOLUTION RESPIRATORY (INHALATION) EVERY 4 HOURS PRN
Status: DISCONTINUED | OUTPATIENT
Start: 2018-07-18 | End: 2018-07-24 | Stop reason: HOSPADM

## 2018-07-18 RX ADMIN — CEFDINIR 300 MG: 300 CAPSULE ORAL at 08:59

## 2018-07-18 RX ADMIN — DIPHENHYDRAMINE HYDROCHLORIDE 25 MG: 50 INJECTION INTRAMUSCULAR; INTRAVENOUS at 05:04

## 2018-07-18 RX ADMIN — Medication 2 MCG/KG/MIN: at 23:05

## 2018-07-18 RX ADMIN — LORAZEPAM 0.5 MG: 0.5 TABLET ORAL at 05:04

## 2018-07-18 RX ADMIN — DRONABINOL 5 MG: 2.5 CAPSULE ORAL at 22:03

## 2018-07-18 RX ADMIN — Medication 2 MCG/KG/MIN: at 16:54

## 2018-07-18 RX ADMIN — DIPHENHYDRAMINE HYDROCHLORIDE 25 MG: 50 INJECTION INTRAMUSCULAR; INTRAVENOUS at 13:53

## 2018-07-18 RX ADMIN — SENNOSIDES AND DOCUSATE SODIUM 2 TABLET: 8.6; 5 TABLET ORAL at 08:58

## 2018-07-18 RX ADMIN — HEPARIN SODIUM 5000 UNITS: 5000 INJECTION, SOLUTION INTRAVENOUS; SUBCUTANEOUS at 05:03

## 2018-07-18 RX ADMIN — HEPARIN SODIUM 5000 UNITS: 5000 INJECTION, SOLUTION INTRAVENOUS; SUBCUTANEOUS at 13:53

## 2018-07-18 RX ADMIN — LORAZEPAM 0.5 MG: 0.5 TABLET ORAL at 15:44

## 2018-07-18 RX ADMIN — HEPARIN SODIUM 5000 UNITS: 5000 INJECTION, SOLUTION INTRAVENOUS; SUBCUTANEOUS at 22:03

## 2018-07-18 RX ADMIN — LETROZOLE 2.5 MG: 2.5 TABLET, FILM COATED ORAL at 08:59

## 2018-07-18 RX ADMIN — LORAZEPAM 0.5 MG: 0.5 TABLET ORAL at 22:03

## 2018-07-18 RX ADMIN — DRONABINOL 5 MG: 2.5 CAPSULE ORAL at 08:58

## 2018-07-18 RX ADMIN — LORAZEPAM 0.5 MG: 0.5 TABLET ORAL at 13:54

## 2018-07-18 RX ADMIN — IPRATROPIUM BROMIDE AND ALBUTEROL SULFATE 3 ML: 2.5; .5 SOLUTION RESPIRATORY (INHALATION) at 12:34

## 2018-07-18 RX ADMIN — CARVEDILOL 3.12 MG: 3.12 TABLET, FILM COATED ORAL at 08:59

## 2018-07-18 RX ADMIN — ATORVASTATIN CALCIUM 20 MG: 20 TABLET, FILM COATED ORAL at 22:03

## 2018-07-18 RX ADMIN — TRIAMCINOLONE ACETONIDE: 1 CREAM TOPICAL at 08:59

## 2018-07-18 RX ADMIN — CARVEDILOL 3.12 MG: 3.12 TABLET, FILM COATED ORAL at 16:54

## 2018-07-18 RX ADMIN — IPRATROPIUM BROMIDE AND ALBUTEROL SULFATE 3 ML: 2.5; .5 SOLUTION RESPIRATORY (INHALATION) at 04:17

## 2018-07-18 RX ADMIN — PANTOPRAZOLE SODIUM 40 MG: 40 TABLET, DELAYED RELEASE ORAL at 05:04

## 2018-07-18 RX ADMIN — IPRATROPIUM BROMIDE AND ALBUTEROL SULFATE 3 ML: 2.5; .5 SOLUTION RESPIRATORY (INHALATION) at 07:51

## 2018-07-18 RX ADMIN — CEFDINIR 300 MG: 300 CAPSULE ORAL at 22:03

## 2018-07-18 NOTE — PLAN OF CARE
Problem: Patient Care Overview  Goal: Interprofessional Rounds/Family Conf  Outcome: Ongoing (interventions implemented as appropriate)  Palliative Team Attendance 1300. Da FERRIS,  Janine BASILIO, , Zack RN PN, Kelli Velasquez [facility], Keira Hoffman RN PN,    07/18/18 1300   Interdisciplinary Rounds/Family Conf   Summary massage, healing touch,  support to pt. start ketamine infusion for pain control. ativan scheduled and prn.

## 2018-07-18 NOTE — CONSULTS
CONSULTATION NOTE    NAME:      Betsy Chi  :                                                          1955  DATE OF CONSULTATION:                       2018   REQUESTING PHYSICIAN:                   Mary Mendoza MD  REASON FOR CONSULTATION:           Metastatic breast cancer to bone, [id number], Stage IV     BRIEF HISTORY:  Betsy Chi  is a very pleasant 62 y.o. female  with metastatic breast cancer to bone.  She was admitted for pain control.  She has widely spread metastatic bony disease.  She is unable to localize the pain.  It depends on her movement and varies from site site.  PET scan of 2018 showed hypermetabolic left axillary lymph node.  Activity in the right humerus, right hilum, T9, cutaneous activity and skin of the left breast, numerous bony pelvic and proximal right and left femurs, a few lumbar lesions and posterior right ilium and lateral right ilium.  I've been asked to see her regarding samarium-153 injection.  Mrs. Díaz has multiple medical allergies including most pain medicines.    Allergies   Allergen Reactions   • Ampicillin Anaphylaxis     Tolerated cefdinir   • Erythromycin Anaphylaxis   • Morphine And Related Anaphylaxis   • Oxycodone-Acetaminophen Anaphylaxis   • Oxycodone-Aspirin Anaphylaxis   • Penicillins Anaphylaxis     Tolerated cefdinir   • Sulfa Antibiotics Swelling and Rash   • Fentanyl Hives     Hives, itching   • Aspirin GI Bleeding   • Codeine Swelling   • Contrast Dye Swelling   • Ibuprofen GI Bleeding   • Latex Arrhythmia   • Propoxyphene Nausea And Vomiting   • Saccharin Nausea And Vomiting   • Tramadol Swelling       Social History   Substance Use Topics   • Smoking status: Light Tobacco Smoker     Packs/day: 0.25     Years: 46.00     Types: Cigarettes   • Smokeless tobacco: Never Used      Comment: has attempted to quit this week   • Alcohol use No         Past Medical History:   Diagnosis Date   • Arthritis    • Asthma    • Atrial fibrillation  (CMS/HCC)    • Breast cancer (CMS/HCC)    • CHF (congestive heart failure) (CMS/HCC)    • Chronic kidney disease (CKD)    • COPD (chronic obstructive pulmonary disease) (CMS/HCC)    • CVA (cerebral vascular accident) (CMS/HCC)    • Diabetes mellitus, type 2 (CMS/HCC)    • GERD (gastroesophageal reflux disease)    • Hypercholesteremia    • Hypertension    • Myocardial infarction    • Osteoarthritis    • Psoriatic arthritis (CMS/HCC)    • Pulmonary embolism (CMS/HCC)    • Rheumatoid arthritis (CMS/HCC)    • Sciatica    • Smoker    • UTI (urinary tract infection)        family history includes Aneurysm in her father and mother; Breast cancer in her cousin; Cancer in her father and mother.     Past Surgical History:   Procedure Laterality Date   • BLADDER SURGERY     • BREAST BIOPSY     • BREAST LUMPECTOMY     • BRONCHOSCOPY N/A 5/9/2018    Procedure: BRONCHOSCOPY WITH ENDOBRONCHIAL ULTRASOUND AND NAVIGATION WITH FLUORO;  Surgeon: Nixon Mulligan MD;  Location: [facility];  Service: Pulmonary   • HYSTERECTOMY     • OOPHORECTOMY     • TUMOR REMOVAL      fallopian tube   • US GUIDED FINE NEEDLE ASPIRATION  5/3/2018        Review of Systems - Oncology        Objective   VITAL SIGNS:   Vitals:    07/18/18 0751 07/18/18 0858 07/18/18 1125 07/18/18 1234   BP:   113/73    BP Location:   Right arm    Patient Position:   Lying    Pulse: 83 91 88 91   Resp:   16 16   Temp:   99 °F (37.2 °C)    TempSrc:   Oral    SpO2: 94%   92%   Weight:       Height:            KPS      70%    Physical Exam   Constitutional: She appears well-developed and well-nourished.   Cardiovascular: Normal rate, regular rhythm and normal heart sounds.    Pulmonary/Chest: Effort normal and breath sounds normal.   Abdominal: Soft.   Nursing note and vitals reviewed.           The following portions of the patient's history were reviewed and updated as appropriate: allergies, current medications, past family history, past medical history, past  social history, past surgical history and problem list.    Assessment      IMPRESSION:  Ms. Chi has multiple bony metastases      RECOMMENDATIONS:  Betsy Chi is a 62 y.o. -year-old female who now presents with a  stage IV. I spent time with the patient discussing the overall prognosis of the cancer, the available options, and specifically the role of Samarium-153. The patient had several questions which I believe were answered to her satisfaction,. After providing a full explanation of all of the risks and side effects of treatment, as well as benefits, she  Is going to  consider the option of Samarium-153 injection. We left her further information to review and will follow up with her tomorrow regarding her decision.  Thank you for asking me to see Ms. Chi.           Marianne Nunez MD      Errors in dictation may reflect use of voice recognition software and not all errors in transcription may have been detected prior to signing.

## 2018-07-18 NOTE — PROGRESS NOTES
Palliative Care Progress Note    Date of Admission: 7/11/2018    Subjective:  Still complaining of pain, stating that she had a rough night last night.    Current Code Status     Date Active Code Status Order ID Comments User Context       7/11/2018 12:54 PM CPR [id number]  Ania LAFLEUR Moises, DO Inpatient       Questions for Current Code Status     Question Answer Comment    Code Status CPR     Medical Interventions (Level of Support Prior to Arrest) Full     Level Of Support Discussed With Patient         No current facility-administered medications on file prior to encounter.      Current Outpatient Prescriptions on File Prior to Encounter   Medication Sig Dispense Refill   • albuterol (PROVENTIL HFA;VENTOLIN HFA) 108 (90 Base) MCG/ACT inhaler Inhale 2 puffs Every 4 (Four) Hours As Needed for Wheezing. 1 inhaler 2   • aluminum acetate (DOMEBORO) pack packet Apply  topically 3 (Three) Times a Day As Needed (psoriatic arthritis). 100 each 6   • amLODIPine (NORVASC) 10 MG tablet TAKE ONE TABLET BY MOUTH DAILY 30 tablet 0   • atorvastatin (LIPITOR) 20 MG tablet Take 1 tablet by mouth Every Night. 90 tablet 0   • carvedilol (COREG) 12.5 MG tablet TAKE ONE TABLET BY MOUTH TWICE A DAY WITH MEALS 60 tablet 0   • Cranberry 600 MG tablet Take 1 tablet by mouth Daily.     • glucose blood test strip Use as instructed 100 each 12   • glucose monitor monitoring kit 1 each 3 (Three) Times a Day As Needed (hypo/hyperglycemia). 1 each 3   • Lancets (ACCU-CHEK SOFT TOUCH) lancets Three times daily and as needed 100 each 12   • letrozole (FEMARA) 2.5 MG tablet Take 1 tablet by mouth Daily. 30 tablet 11   • ondansetron (ZOFRAN) 8 MG tablet Take 1 tablet by mouth 3 (Three) Times a Day As Needed for Nausea or Vomiting. 30 tablet 5   • pantoprazole (PROTONIX) 40 MG EC tablet Take 1 tablet by mouth Daily. 90 tablet 2        •  acetaminophen  •  bisacodyl  •  dextrose  •  dextrose  •  diphenhydrAMINE **OR** diphenhydrAMINE  •  glucagon  "(human recombinant)  •  LORazepam  •  ondansetron  •  polyethylene glycol  •  sodium chloride    Objective: /73 (BP Location: Right arm, Patient Position: Lying)   Pulse 88   Temp 99 °F (37.2 °C) (Oral)   Resp 16   Ht 160 cm (62.99\")   Wt 82.1 kg (181 lb)   SpO2 94%   BMI 32.07 kg/m²      Intake/Output Summary (Last 24 hours) at 07/18/18 1225  Last data filed at 07/18/18 1100   Gross per 24 hour   Intake              120 ml   Output             2000 ml   Net            -1880 ml     Physical Exam:      General Appearance:    Alert, cooperative, At times she is tearful during the exam    Head:    Normocephalic, without obvious abnormality, atraumatic   Eyes:            Lids and lashes normal, conjunctivae and sclerae normal, no   icterus, no pallor, corneas clear, PERRLA   Ears:    Ears appear intact with no abnormalities noted   Throat:   No oral lesions, no thrush, oral mucosa moist   Neck:   No adenopathy, supple, trachea midline, no thyromegaly, no     carotid bruit, no JVD   Back:     No kyphosis present, no scoliosis present, no skin lesions,       erythema or scars, no tenderness to percussion or                   palpation,   range of motion normal   Lungs:     Clear to auscultation,respirations regular, even and                   unlabored    Heart:    Regular rhythm and normal rate, normal S1 and S2, no            murmur, no gallop, no rub, no click   Breast Exam:    Deferred   Abdomen:     Normal bowel sounds, no masses, no organomegaly, soft        non-tender, non-distended, no guarding, no rebound                 tenderness   Genitalia:    Deferred   Extremities:   Moves all extremities well, no edema, no cyanosis, no              redness   Pulses:   Pulses palpable and equal bilaterally   Skin:   No bleeding, bruising or rash   Lymph nodes:   No palpable adenopathy   Neurologic:   Cranial nerves 2 - 12 grossly intact, sensation intact, DTR        present and equal bilaterally       Results " from last 7 days  Lab Units 07/11/18  1308   WBC 10*3/mm3 7.35   HEMOGLOBIN g/dL 14.1   HEMATOCRIT % 42.7   PLATELETS 10*3/mm3 230       Results from last 7 days  Lab Units 07/14/18  0744  07/11/18  1308   SODIUM mmol/L 140  < > 142   POTASSIUM mmol/L 3.9  < > 4.6   CHLORIDE mmol/L 110*  < > 107   CO2 mmol/L 23.0  < > 27.0   BUN mg/dL 10  < > 15   CREATININE mg/dL 0.69  < > 1.07   CALCIUM mg/dL 8.0*  < > 9.5   BILIRUBIN mg/dL  --   --  0.8   ALK PHOS U/L  --   --  138*   ALT (SGPT) U/L  --   --  28   AST (SGOT) U/L  --   --  24   GLUCOSE mg/dL 113*  < > 102*   < > = values in this interval not displayed.    Impression: Breast cancer with bone metastasis  Dysphagia  Dyspnea  Goals of care  Plan:  I am going to start the patinet on a Ketamine infusion.  Continue PRN ramandeep Ojeda DO  07/18/18  12:25 PM

## 2018-07-18 NOTE — PROGRESS NOTES
Subjective     PROBLEM LIST:  1. [id number] invasive ductal carcinoma of the left breast, ER+, WY+, Her2 negative  A) presented with a palpable mass in the left breast as well as skin thickening.  biopsy showed multifocal low grade IDC.  Skin biopsy negative.  B) PET/CT 6/6/18 showed widespread bony metastatic disease  2. History of right breast cancer 2001, treated with lumpectomy and SLNB, ? CMF, and adjuvant hormonal therapy  3. Anxiety/depression  4. Rheumatoid arthritis  5. Asthma  6. CHF  7. COPD  8. Diabetes mellitus type 2  9. GERD  10. HTN  11. HL  12. CVA  13. Tobacco abuse  14. choriocarcinoma  15. Fibromyalgia  16. Atrial fibrillation    INTERVAL HISTORY: rough night with pain.  Starting ketamine infusion today - cant tell a difference so far.    REVIEW OF SYSTEMS:  A 14 point review of systems was performed and is negative except as noted above.      Objective     Vitals:    07/18/18 0751 07/18/18 0858 07/18/18 1125 07/18/18 1234   BP:   113/73    BP Location:   Right arm    Patient Position:   Lying    Pulse: 83 91 88 91   Resp:   16 16   Temp:   99 °F (37.2 °C)    TempSrc:   Oral    SpO2: 94%   92%   Weight:       Height:             General:  female in no acute distress  Neuro: alert and oriented  HEENT: sclera anicteric, oropharynx clear  Lymphatics: no cervical, supraclavicular, or axillary adenopathy  Cardiovascular: regular rate and rhythm, no murmurs  Lungs: clear to auscultation bilaterally  Abdomen: soft, nontender, nondistended.  No palpable organomegaly  Extremeties: no lower extremity edema  Psych: mood and affect appropriate            Assessment/Plan     Betsy Chi is a 62 y.o. year old female with metastatic breast cancer, admitted for pain management, dyspnea, evaluation of abdominal pain/vomiting.     Metastatic breast cancer: continue letrozole. Start ibrance after she returns home.  Next zometa due in 3 months.     Pain: ketamine infusion.  Discussed with Dr. javier who will see  her to evaluate for samarium.  I recommended that she try this.    UTI: on cefdinir    Home once better pain control.           Mary Mendoza MD  7/18/2018

## 2018-07-18 NOTE — PROGRESS NOTES
Nutrition Services    Patient Name:  Betsy Chi  YOB: 1955  MRN: [id number]  Admit Date:  7/11/2018    Clinical Nutrition   Reason For Visit: LOS     Patient Name: Betsy Chi  YOB: 1955  MRN: [id number]  Date of Encounter: 07/18/18 7:12 PM  Admission date: 7/11/2018      Comments: Removed renal restr after review labs. Pt does well w frt veg; mindful of CHO control and Na+       Nutrition Assessment     Hospital Problem List  Principal Problem:    Acute on chronic hypoxemic respiratory failure not requiring ventilatory support  Active Problems:    Mixed anxiety depressive disorder    Atrial fibrillation (CMS/HCC)    COPD (chronic obstructive pulmonary disease) (CMS/HCC)    Type 2 diabetes mellitus (CMS/HCC)    Dyslipidemia    Hypertension    Pulmonary nodule right lower lobe medially (2 x 1.5 cm).    Rheumatoid arthritis (CMS/HCC)    Invasive ductal carcinoma of breast, left (CMS/HCC)    Chronic hypoxemic respiratory failure (CMS/HCC)    Dyspnea    Atypical chest pain    Acute on chronic diastolic CHF (congestive heart failure) (CMS/HCC)    Constipation          PMH: She  has a past medical history of Arthritis; Asthma; Atrial fibrillation (CMS/HCC); Breast cancer (CMS/HCC); CHF (congestive heart failure) (CMS/HCC); Chronic kidney disease (CKD); COPD (chronic obstructive pulmonary disease) (CMS/HCC); CVA (cerebral vascular accident) (CMS/HCC); Diabetes mellitus, type 2 (CMS/HCC); GERD (gastroesophageal reflux disease); Hypercholesteremia; Hypertension; Myocardial infarction; Osteoarthritis; Psoriatic arthritis (CMS/HCC); Pulmonary embolism (CMS/HCC); Rheumatoid arthritis (CMS/HCC); Sciatica; Smoker; and UTI (urinary tract infection).   PSxH: She  has a past surgical history that includes Bladder surgery; Tumor removal; Hysterectomy; Breast lumpectomy; Oophorectomy; Breast biopsy; Bronchoscopy (N/A, 5/9/2018); and US Guided Fine Needle Aspiration (5/3/2018).           Reported/Observed/Food/Nutrition Related History     Pt allows mindful of CHO control &Na. Doing well.       Anthropometrics   Height: 63 in  Weight: 181 lb  BMI: 32.07  BMI classification: Obese Class I: 30-34.9kg/m2        Labs reviewed   Labs reviewed: Yes    Results from last 7 days  Lab Units 07/14/18  0744 07/13/18  0632   SODIUM mmol/L 140 140   POTASSIUM mmol/L 3.9 4.2   CHLORIDE mmol/L 110* 105   CO2 mmol/L 23.0 25.0   BUN mg/dL 10 18   CREATININE mg/dL 0.69 1.26   GLUCOSE mg/dL 113* 96   CALCIUM mg/dL 8.0* 8.7   MAGNESIUM mg/dL  --  2.3           Results from last 7 days  Lab Units 07/18/18  1626 07/18/18  1127 07/18/18  0730 07/17/18  2151 07/17/18  1612 07/17/18  1147   GLUCOSE mg/dL 92 106 91 107 138* 81       Medications reviewed   Medications reviewed: Yes      Current Nutrition Prescription   PO: Diet Regular; Cardiac, Consistent Carbohydrate after adj    Evaluation of Received Nutrient/Fluid Intake: Charted 69% intake of 4 meals.      Nutrition Diagnosis     Problem No nutrition diagnosis at this time   Etiology    Signs/Symptoms          Intervention   Intervention: Follow treatment progress, Care plan reviewed, Advise alternate selection, Menu provided      Goal:   General: Nutrition support treatment  PO: Maintain intake    Monitoring/Evaluation:       Monitoring/Evaluation: Per protocol, PO intake, Pertinent labs, Symptoms  Will Continue to follow per protocol  Cherie Ling RD  Time Spent: 20 min    Electronically signed by:  Cherie Ling RD  07/18/18 7:11 PM

## 2018-07-18 NOTE — PROGRESS NOTES
"    [facility] Medicine Services  PROGRESS NOTE    Patient Name: Betsy Chi  : 1955  MRN: [id number]    Date of Admission: 2018  Length of Stay: 7  Primary Care Physician: PRAFUL Wheatley    Subjective   Subjective     CC: f/u n/v and UTI    HPI  Still not sleeping well, very uncomfortable. \"Im just getting tired of this.\" Crying. \"bone pain\" is rating 5/10 at best.     Review of Systems  Gen- No fevers, chills  CV- No chest pain, palpitations  Resp- No cough, dyspnea  GI-  No V/D, abd pain, +nausea    Otherwise ROS is negative except as mentioned in the HPI.    Objective   Objective     Vital Signs:   Temp:  [97.9 °F (36.6 °C)-99 °F (37.2 °C)] 99 °F (37.2 °C)  Heart Rate:  [69-91] 91  Resp:  [16-18] 16  BP: (113-122)/(69-78) 113/73        Physical Exam:  Constitutional: No acute distress, awake, alert, uncomfortable and crying   HENT: NCAT, mucous membranes moist  Respiratory: Clear to auscultation bilaterally, respiratory effort normal   Cardiovascular: RRR, no murmurs, rubs, or gallops, palpable pedal pulses bilaterally  Gastrointestinal: Positive bowel sounds, soft, nontender, nondistended  Musculoskeletal: No bilateral ankle edema  Psychiatric: Appropriate affect, cooperative  Neurologic: Oriented x 3, strength symmetric in all extremities, Cranial Nerves grossly intact to confrontation, speech clear  Skin: No rashes    Results Reviewed:  I have personally reviewed current lab, radiology, and data and agree.          Results from last 7 days  Lab Units 18  0744 18  0632   SODIUM mmol/L 140 140   POTASSIUM mmol/L 3.9 4.2   CHLORIDE mmol/L 110* 105   CO2 mmol/L 23.0 25.0   BUN mg/dL 10 18   CREATININE mg/dL 0.69 1.26   GLUCOSE mg/dL 113* 96   CALCIUM mg/dL 8.0* 8.7     Estimated Creatinine Clearance: 83.6 mL/min (by C-G formula based on SCr of 0.69 mg/dL).  No results found for: BNP    Microbiology Results Abnormal     Procedure Component Value - Date/Time "    Urine Culture - Urine, [[id number]]  (Abnormal)  (Susceptibility) Collected:  07/13/18 1636    Lab Status:  Final result Specimen:  Urine from Urine, Clean Catch Updated:  07/15/18 1032     Urine Culture >100,000 CFU/mL Escherichia coli (A)    Susceptibility      Escherichia coli     CHAMP     Ampicillin <=8 ug/ml Susceptible     Ampicillin + Sulbactam <=8/4 ug/ml Susceptible     Aztreonam <=8 ug/ml Susceptible     Cefepime <=8 ug/ml Susceptible     Cefotaxime <=2 ug/ml Susceptible     Ceftriaxone <=8 ug/ml Susceptible     Cefuroxime sodium <=4 ug/ml Susceptible     Cephalothin 16 ug/ml Intermediate     Ertapenem <=1 ug/ml Susceptible     Gentamicin <=4 ug/ml Susceptible     Levofloxacin <=2 ug/ml Susceptible     Meropenem <=1 ug/ml Susceptible     Nitrofurantoin <=32 ug/ml Susceptible     Piperacillin + Tazobactam <=16 ug/ml Susceptible     Tetracycline <=4 ug/ml Susceptible     Tobramycin <=4 ug/ml Susceptible     Trimethoprim + Sulfamethoxazole <=2/38 ug/ml Susceptible                        Results for orders placed during the hospital encounter of 07/11/18   Adult Transthoracic Echo Complete W/ Cont if Necessary Per Protocol    Narrative · Left ventricular systolic function is normal. Estimated EF = 65%.  · Left ventricular wall thickness is consistent with mild concentric   hypertrophy.  · Left ventricular diastolic dysfunction (grade II) consistent with   pseudonormalization.  · Estimated right ventricular systolic pressure from tricuspid   regurgitation is normal (<35 mmHg).          I have reviewed the medications.    Assessment/Plan   Assessment / Plan     Hospital Problem List     * (Principal)Acute on chronic hypoxemic respiratory failure not requiring ventilatory support    Mixed anxiety depressive disorder    Atrial fibrillation (CMS/HCC)    COPD (chronic obstructive pulmonary disease) (CMS/HCC)    Type 2 diabetes mellitus (CMS/HCC)    Dyslipidemia    Hypertension    Pulmonary nodule right lower lobe  medially (2 x 1.5 cm).    Overview Signed 5/22/2017  1:30 PM by Janine Mckeon PA-C     Description: A.  CT scan of the chest February 11 thousand 16 reports a noncalcified 8.5-9 mm nodule in the medial portion of the right lower lobe.         Rheumatoid arthritis (CMS/HCC)    Invasive ductal carcinoma of breast, left (CMS/HCC)    Chronic hypoxemic respiratory failure (CMS/HCC)    Overview Signed 7/12/2018  2:01 PM by Blade Taylor MD     2Lnc chronically         Dyspnea    Atypical chest pain    Acute on chronic diastolic CHF (congestive heart failure) (CMS/HCC)    Constipation         Brief Hospital Course to date:  Betsy Chi is a 62 y.o. female w/ metastatic breast cancer, copd, chronic 2Lnc use, ? Hx afib, dm2 ra presented as direct admisison from dr. centeno office for multiple complaints including diffuse pain, dyspnea.      Assessment & Plan:  - Abdominal pain and vomiting, etiology unknown, continue supportive therapy as below. IVF, antiemetics  - BRET, suspect this is pre-renal, sec to n/v, Cr baseline is 0.7-0.8, 1.26 today, continue IVF  - Ecoli UTI, s/p merrem, now on cefdinir  - Metastatic breast CA , Dr Centeno following, discussed her pain issue, will consult palliative to assist, continue letrozole, s/p zometa 7/12, plan to start ibrance once she returns home  - Suspected dysphagia, ?pharyngeal ,SLP consulted to evaluate, continue protonix  - Acute respiratory failure, suspect volume overload, echo normal ef, diastolic dysfunction, ct angio no pulm embolism, continue prn diuresis, O2 supp, hold on steroids  - Back pain, suspect this is sec to bony mets, palliative consulted and assisting. Palliative to start Ketamine infusion today for pain control.   - Cards & heme-onc & palliative following (no ischemic evaluation, treat medically), suspected Afib per cards notes (data deficient)  - Continue bowel regimen (miralax bid, senokot bid, prn dulcolax) as constipation likely causing some  discomfort  - steroid cream to rash low back     DVT Prophylaxis: [facility]     CODE STATUS:   Code Status and Medical Interventions:   Ordered at: 07/11/18 1254     Level Of Support Discussed With:    Patient     Code Status:    CPR     Medical Interventions (Level of Support Prior to Arrest):    Full       Disposition: anticipate d/c home once pain is reasonably controlled    Electronically signed by PRAFUL Medina, 07/18/18, 3:51 PM.

## 2018-07-18 NOTE — PLAN OF CARE
Problem: Patient Care Overview  Goal: Plan of Care Review  Outcome: Ongoing (interventions implemented as appropriate)   07/18/18 0568   Coping/Psychosocial   Plan of Care Reviewed With patient   Plan of Care Review   Progress no change   OTHER   Outcome Summary Pt did not have a restful night. K pad was used to help a little. Pain has moved into her hands and wrist causing the patient to writhe in pain and cry. Tried k pad, light, simple massage, and calming music and imagery with limited success. Sleep was limited as well despite quiet restful environment. Medication options are limited. VS stable. Continuing to monitor. 7/18/18 @ 0552.       Problem: Skin Injury Risk (Adult)  Goal: Skin Health and Integrity  Outcome: Ongoing (interventions implemented as appropriate)      Problem: Fall Risk (Adult)  Goal: Absence of Fall  Outcome: Ongoing (interventions implemented as appropriate)      Problem: Palliative Care (Adult)  Goal: Maximized Comfort  Outcome: Ongoing (interventions implemented as appropriate)

## 2018-07-18 NOTE — PROGRESS NOTES
Subjective     PROBLEM LIST:  1. [id number] invasive ductal carcinoma of the left breast, ER+, MI+, Her2 negative  A) presented with a palpable mass in the left breast as well as skin thickening.  biopsy showed multifocal low grade IDC.  Skin biopsy negative.  B) PET/CT 6/6/18 showed widespread bony metastatic disease  2. History of right breast cancer 2001, treated with lumpectomy and SLNB, ? CMF, and adjuvant hormonal therapy  3. Anxiety/depression  4. Rheumatoid arthritis  5. Asthma  6. CHF  7. COPD  8. Diabetes mellitus type 2  9. GERD  10. HTN  11. HL  12. CVA  13. Tobacco abuse  14. choriocarcinoma  15. Fibromyalgia  16. Atrial fibrillation    INTERVAL HISTORY: feels about the same.  Still having some trouble with pain.  She is not able to localize to one area - she says it moves around and depends on her position and activities.  She feels like her pain is getting worse.    REVIEW OF SYSTEMS:  A 14 point review of systems was performed and is negative except as noted above.      Objective     Vitals:    07/17/18 1543 07/17/18 1725 07/17/18 1920 07/17/18 2000   BP:  118/69  115/78   BP Location:    Right arm   Patient Position:    Lying   Pulse: 84 85 81    Resp: 16  17    Temp:       TempSrc:       SpO2: 93%  95%    Weight:       Height:             General:  female in no acute distress  Neuro: alert and oriented  HEENT: sclera anicteric, oropharynx clear  Lymphatics: no cervical, supraclavicular, or axillary adenopathy  Cardiovascular: regular rate and rhythm, no murmurs  Lungs: clear to auscultation bilaterally  Abdomen: soft, nontender, nondistended.  No palpable organomegaly  Extremeties: no lower extremity edema  Psych: mood and affect appropriate            Assessment/Plan     Betsy Chi is a 62 y.o. year old female with metastatic breast cancer, admitted for pain management, dyspnea, evaluation of abdominal pain/vomiting.     Metastatic breast cancer: continue letrozole. Start ibrance after she  returns home.  Next zometa due in 3 months.       Pain: intolerant of all categories of analgesics.  D/w Dr. Ojeda who is considering ketamine infusion.  Will discuss option of samarium with rad onc.    UTI: on cefdinir    Hopefully home soon.           Mary Mendoza MD  7/17/2018

## 2018-07-18 NOTE — PROGRESS NOTES
Continued Stay Note   Lul     Patient Name: Betsy Chi  MRN: [id number]  Today's Date: 7/18/2018    Admit Date: 7/11/2018          Discharge Plan     Row Name 07/18/18 1446       Plan    Plan ongoing     Patient/Family in Agreement with Plan yes    Plan Comments Patient has been struggling with pain control and has not been discharged as orginally expected. According to Palliative Care's note, patient is being started on a Ketamine infusion today. Portable 02 is at bedside for when patient is ready to be discharged home where she lives with her nephew - discharge date is undetermined at this time -  following - aw [id number]              Discharge Codes    No documentation.           Isatu Murcia RN

## 2018-07-19 ENCOUNTER — APPOINTMENT (OUTPATIENT)
Dept: GENERAL RADIOLOGY | Facility: HOSPITAL | Age: 63
End: 2018-07-19

## 2018-07-19 LAB
GLUCOSE BLDC GLUCOMTR-MCNC: 102 MG/DL (ref 70–130)
GLUCOSE BLDC GLUCOMTR-MCNC: 104 MG/DL (ref 70–130)
GLUCOSE BLDC GLUCOMTR-MCNC: 109 MG/DL (ref 70–130)
GLUCOSE BLDC GLUCOMTR-MCNC: 98 MG/DL (ref 70–130)

## 2018-07-19 PROCEDURE — 99232 SBSQ HOSP IP/OBS MODERATE 35: CPT | Performed by: INTERNAL MEDICINE

## 2018-07-19 PROCEDURE — 99232 SBSQ HOSP IP/OBS MODERATE 35: CPT | Performed by: NURSE PRACTITIONER

## 2018-07-19 PROCEDURE — 82962 GLUCOSE BLOOD TEST: CPT

## 2018-07-19 PROCEDURE — 63710000001 DRONABINOL PER 2.5 MG: Performed by: FAMILY MEDICINE

## 2018-07-19 PROCEDURE — 25010000002 HEPARIN (PORCINE) PER 1000 UNITS: Performed by: FAMILY MEDICINE

## 2018-07-19 PROCEDURE — 73130 X-RAY EXAM OF HAND: CPT

## 2018-07-19 PROCEDURE — 94760 N-INVAS EAR/PLS OXIMETRY 1: CPT

## 2018-07-19 PROCEDURE — 94799 UNLISTED PULMONARY SVC/PX: CPT

## 2018-07-19 RX ADMIN — CARVEDILOL 3.12 MG: 3.12 TABLET, FILM COATED ORAL at 08:37

## 2018-07-19 RX ADMIN — HEPARIN SODIUM 5000 UNITS: 5000 INJECTION, SOLUTION INTRAVENOUS; SUBCUTANEOUS at 14:08

## 2018-07-19 RX ADMIN — LORAZEPAM 0.5 MG: 0.5 TABLET ORAL at 14:09

## 2018-07-19 RX ADMIN — Medication 3 MCG/KG/MIN: at 12:13

## 2018-07-19 RX ADMIN — ATORVASTATIN CALCIUM 20 MG: 20 TABLET, FILM COATED ORAL at 21:12

## 2018-07-19 RX ADMIN — DRONABINOL 5 MG: 2.5 CAPSULE ORAL at 08:49

## 2018-07-19 RX ADMIN — HEPARIN SODIUM 5000 UNITS: 5000 INJECTION, SOLUTION INTRAVENOUS; SUBCUTANEOUS at 05:12

## 2018-07-19 RX ADMIN — IPRATROPIUM BROMIDE AND ALBUTEROL SULFATE 3 ML: 2.5; .5 SOLUTION RESPIRATORY (INHALATION) at 21:39

## 2018-07-19 RX ADMIN — SENNOSIDES AND DOCUSATE SODIUM 2 TABLET: 8.6; 5 TABLET ORAL at 21:12

## 2018-07-19 RX ADMIN — DRONABINOL 5 MG: 2.5 CAPSULE ORAL at 21:13

## 2018-07-19 RX ADMIN — TRIAMCINOLONE ACETONIDE: 1 CREAM TOPICAL at 21:13

## 2018-07-19 RX ADMIN — PANTOPRAZOLE SODIUM 40 MG: 40 TABLET, DELAYED RELEASE ORAL at 05:12

## 2018-07-19 RX ADMIN — CEFDINIR 300 MG: 300 CAPSULE ORAL at 21:13

## 2018-07-19 RX ADMIN — Medication 2 MCG/KG/MIN: at 05:00

## 2018-07-19 RX ADMIN — TRIAMCINOLONE ACETONIDE: 1 CREAM TOPICAL at 08:37

## 2018-07-19 RX ADMIN — Medication 3 MCG/KG/MIN: at 16:21

## 2018-07-19 RX ADMIN — LETROZOLE 2.5 MG: 2.5 TABLET, FILM COATED ORAL at 08:37

## 2018-07-19 RX ADMIN — CEFDINIR 300 MG: 300 CAPSULE ORAL at 08:37

## 2018-07-19 RX ADMIN — LORAZEPAM 0.5 MG: 0.5 TABLET ORAL at 21:13

## 2018-07-19 RX ADMIN — HEPARIN SODIUM 5000 UNITS: 5000 INJECTION, SOLUTION INTRAVENOUS; SUBCUTANEOUS at 21:13

## 2018-07-19 RX ADMIN — Medication 5 MCG/KG/MIN: at 22:28

## 2018-07-19 RX ADMIN — LORAZEPAM 0.5 MG: 0.5 TABLET ORAL at 05:12

## 2018-07-19 RX ADMIN — CARVEDILOL 3.12 MG: 3.12 TABLET, FILM COATED ORAL at 17:16

## 2018-07-19 RX ADMIN — SENNOSIDES AND DOCUSATE SODIUM 2 TABLET: 8.6; 5 TABLET ORAL at 08:37

## 2018-07-19 NOTE — PROGRESS NOTES
"    [facility] Medicine Services  PROGRESS NOTE    Patient Name: Betsy Chi  : 1955  MRN: [id number]    Date of Admission: 2018  Length of Stay: 8  Primary Care Physician: PRAFUL Wheatley    Subjective   Subjective     CC: f/u n/v and UTI    HPI  Pt is seen sitting up in chair.  Appears to be in pain, grimacing, tearful.  States she \"hurts all over\" in her bones.  No appetite today.  Pain now 6-7/10 scale.  Not sleeping very well.  Mild intermittent nausea.  No vomiting.  Bowel and bladder working okay.        Review of Systems  Gen- No fevers, chills  CV- No chest pain, palpitations  Resp- No cough, dyspnea  GI-  No V/D, abd pain, +nausea    Otherwise ROS is negative except as mentioned in the HPI.    Objective   Objective     Vital Signs:   Temp:  [98 °F (36.7 °C)-99.4 °F (37.4 °C)] 98.7 °F (37.1 °C)  Heart Rate:  [81-89] 81  Resp:  [18] 18  BP: (107-136)/(56-81) 130/56        Physical Exam:  Constitutional: No acute distress, awake, alert, uncomfortable and crying.  Sitting up in chair.  No visitors at bs.    HENT: NCAT, mucous membranes moist  Respiratory: Clear to auscultation bilaterally A/P, respiratory effort normal on 1LNC and sats are 94%  Cardiovascular: RRR, no murmurs, rubs, or gallops, palpable pedal pulses bilaterally  Gastrointestinal: Positive bowel sounds, soft, nontender, nondistended.  Obese   Musculoskeletal: No bilateral ankle edema.  ESPARZA spont  Psychiatric: Appropriate affect/ tearful, cooperative and calm  Neurologic: Oriented x 3, strength symmetric in all extremities, Cranial Nerves grossly intact to confrontation, speech clear and appropriate.  Follows commands   Skin: No rashes    Results Reviewed:  I have personally reviewed current lab, radiology, and data and agree.          Results from last 7 days  Lab Units 18  0744 18  0632   SODIUM mmol/L 140 140   POTASSIUM mmol/L 3.9 4.2   CHLORIDE mmol/L 110* 105   CO2 mmol/L 23.0 25.0 "   BUN mg/dL 10 18   CREATININE mg/dL 0.69 1.26   GLUCOSE mg/dL 113* 96   CALCIUM mg/dL 8.0* 8.7     Estimated Creatinine Clearance: 83.6 mL/min (by C-G formula based on SCr of 0.69 mg/dL).  No results found for: BNP    Microbiology Results Abnormal     Procedure Component Value - Date/Time    Urine Culture - Urine, [[id number]]  (Abnormal)  (Susceptibility) Collected:  07/13/18 1636    Lab Status:  Final result Specimen:  Urine from Urine, Clean Catch Updated:  07/15/18 1032     Urine Culture >100,000 CFU/mL Escherichia coli (A)    Susceptibility      Escherichia coli     CHAMP     Ampicillin <=8 ug/ml Susceptible     Ampicillin + Sulbactam <=8/4 ug/ml Susceptible     Aztreonam <=8 ug/ml Susceptible     Cefepime <=8 ug/ml Susceptible     Cefotaxime <=2 ug/ml Susceptible     Ceftriaxone <=8 ug/ml Susceptible     Cefuroxime sodium <=4 ug/ml Susceptible     Cephalothin 16 ug/ml Intermediate     Ertapenem <=1 ug/ml Susceptible     Gentamicin <=4 ug/ml Susceptible     Levofloxacin <=2 ug/ml Susceptible     Meropenem <=1 ug/ml Susceptible     Nitrofurantoin <=32 ug/ml Susceptible     Piperacillin + Tazobactam <=16 ug/ml Susceptible     Tetracycline <=4 ug/ml Susceptible     Tobramycin <=4 ug/ml Susceptible     Trimethoprim + Sulfamethoxazole <=2/38 ug/ml Susceptible                        Results for orders placed during the hospital encounter of 07/11/18   Adult Transthoracic Echo Complete W/ Cont if Necessary Per Protocol    Narrative · Left ventricular systolic function is normal. Estimated EF = 65%.  · Left ventricular wall thickness is consistent with mild concentric   hypertrophy.  · Left ventricular diastolic dysfunction (grade II) consistent with   pseudonormalization.  · Estimated right ventricular systolic pressure from tricuspid   regurgitation is normal (<35 mmHg).          I have reviewed the medications.    Assessment/Plan   Assessment / Plan     Hospital Problem List     * (Principal)Acute on chronic hypoxemic  respiratory failure not requiring ventilatory support    Mixed anxiety depressive disorder    Atrial fibrillation (CMS/HCC)    COPD (chronic obstructive pulmonary disease) (CMS/HCC)    Type 2 diabetes mellitus (CMS/HCC)    Dyslipidemia    Hypertension    Pulmonary nodule right lower lobe medially (2 x 1.5 cm).    Overview Signed 5/22/2017  1:30 PM by Janine Mckeon PA-C     Description: A.  CT scan of the chest February 11 thousand 16 reports a noncalcified 8.5-9 mm nodule in the medial portion of the right lower lobe.         Rheumatoid arthritis (CMS/HCC)    Invasive ductal carcinoma of breast, left (CMS/HCC)    Chronic hypoxemic respiratory failure (CMS/HCC)    Overview Signed 7/12/2018  2:01 PM by Blade Taylor MD     2Lnc chronically         Dyspnea    Atypical chest pain    Acute on chronic diastolic CHF (congestive heart failure) (CMS/HCC)    Constipation         Brief Hospital Course to date:  Betsy Chi is a 62 y.o. female w/ metastatic breast cancer, copd, chronic 2Lnc use, ? Hx afib, dm2 ra presented as direct admisison from dr. centeno office for multiple complaints including diffuse pain, dyspnea.      Assessment & Plan:  - Abdominal pain and vomiting, etiology unknown, continue supportive therapy as below. IVF, antiemetics  ---slightly better, now with intermittent nausea, no vomiting, but no appetite     - BRET, suspect this is pre-renal, sec to n/v, Cr baseline is 0.7-0.8  Resolved after IVF.  No labs since 7/14.  Will jailene labs in am     - Ecoli UTI, s/p merrem, now on cefdinir    - Metastatic breast CA , Dr Centeno following, discussed her pain issue, will consult palliative to assist, continue letrozole, s/p zometa 7/12, plan to start ibrance once she returns home    - Suspected dysphagia, ?pharyngeal ,SLP consulted to evaluate, continue protonix  - Acute respiratory failure, suspect volume overload, echo normal ef, diastolic dysfunction, ct angio no pulm embolism, continue prn diuresis,  "O2 supp, hold on steroids    - Back pain with diffuse \"bone pain\", suspect this is sec to bony mets, palliative consulted and assisting. Palliative to start Ketamine infusion today for pain control.     - Cards & heme-onc & palliative following (no ischemic evaluation, treat medically), suspected Afib per cards notes (data deficient)  - Continue bowel regimen (miralax bid, senokot bid, prn dulcolax) as constipation likely causing some discomfort  - steroid cream to rash low back     DVT Prophylaxis: [facility]     CODE STATUS:   Code Status and Medical Interventions:   Ordered at: 07/11/18 1254     Level Of Support Discussed With:    Patient     Code Status:    CPR     Medical Interventions (Level of Support Prior to Arrest):    Full       Disposition: anticipate d/c home once pain is reasonably controlled, ??TBD    Electronically signed by PRAFUL Murdock, 07/19/18, 4:41 PM.    "

## 2018-07-19 NOTE — PLAN OF CARE
Problem: Patient Care Overview  Goal: Plan of Care Review  Outcome: Ongoing (interventions implemented as appropriate)   07/19/18 1300   Coping/Psychosocial   Plan of Care Reviewed With patient   Plan of Care Review   Progress improving   OTHER   Outcome Summary ketamine infusion titrated to assist with pain control. pt remains tearful and needs a lot of support. encouraged deep breathing and relaxation.

## 2018-07-19 NOTE — PROGRESS NOTES
Palliative Care Progress Note    Date of Admission: 7/11/2018    Subjective:  Patient states that her pain has not been probably somewhat better, however she states that she is now having pain in her left thumbwhich is new.  Patient denieshaving any trauma to that area but wonders if she may have pushed hersefl up awkwardly with that hand.  Current Code Status     Date Active Code Status Order ID Comments User Context       7/11/2018 12:54 PM CPR [id number]  Ania De Leon, DO Inpatient       Questions for Current Code Status     Question Answer Comment    Code Status CPR     Medical Interventions (Level of Support Prior to Arrest) Full     Level Of Support Discussed With Patient         No current facility-administered medications on file prior to encounter.      Current Outpatient Prescriptions on File Prior to Encounter   Medication Sig Dispense Refill   • albuterol (PROVENTIL HFA;VENTOLIN HFA) 108 (90 Base) MCG/ACT inhaler Inhale 2 puffs Every 4 (Four) Hours As Needed for Wheezing. 1 inhaler 2   • aluminum acetate (DOMEBORO) pack packet Apply  topically 3 (Three) Times a Day As Needed (psoriatic arthritis). 100 each 6   • amLODIPine (NORVASC) 10 MG tablet TAKE ONE TABLET BY MOUTH DAILY 30 tablet 0   • atorvastatin (LIPITOR) 20 MG tablet Take 1 tablet by mouth Every Night. 90 tablet 0   • carvedilol (COREG) 12.5 MG tablet TAKE ONE TABLET BY MOUTH TWICE A DAY WITH MEALS 60 tablet 0   • Cranberry 600 MG tablet Take 1 tablet by mouth Daily.     • glucose blood test strip Use as instructed 100 each 12   • glucose monitor monitoring kit 1 each 3 (Three) Times a Day As Needed (hypo/hyperglycemia). 1 each 3   • Lancets (ACCU-CHEK SOFT TOUCH) lancets Three times daily and as needed 100 each 12   • letrozole (FEMARA) 2.5 MG tablet Take 1 tablet by mouth Daily. 30 tablet 11   • ondansetron (ZOFRAN) 8 MG tablet Take 1 tablet by mouth 3 (Three) Times a Day As Needed for Nausea or Vomiting. 30 tablet 5   • pantoprazole  "(PROTONIX) 40 MG EC tablet Take 1 tablet by mouth Daily. 90 tablet 2       ketamine (KETALAR) infusion 3 mcg/kg/min Last Rate: 2 mcg/kg/min (07/19/18 0500)     •  acetaminophen  •  bisacodyl  •  dextrose  •  dextrose  •  diphenhydrAMINE **OR** diphenhydrAMINE  •  glucagon (human recombinant)  •  ipratropium-albuterol  •  LORazepam  •  ondansetron  •  polyethylene glycol  •  sodium chloride    Objective: /81   Pulse 84   Temp 99.1 °F (37.3 °C) (Oral)   Resp 18   Ht 160 cm (62.99\")   Wt 82.1 kg (181 lb)   SpO2 91%   BMI 32.07 kg/m²      Intake/Output Summary (Last 24 hours) at 07/19/18 1055  Last data filed at 07/19/18 0900   Gross per 24 hour   Intake              240 ml   Output             2400 ml   Net            -2160 ml     Physical Exam:      General Appearance:    Alert, cooperative, At times she is tearful during the exam    Head:    Normocephalic, without obvious abnormality, atraumatic   Eyes:            Lids and lashes normal, conjunctivae and sclerae normal, no   icterus, no pallor, corneas clear, PERRLA   Ears:    Ears appear intact with no abnormalities noted   Throat:   No oral lesions, no thrush, oral mucosa moist   Neck:   No adenopathy, supple, trachea midline, no thyromegaly, no     carotid bruit, no JVD   Back:     No kyphosis present, no scoliosis present, no skin lesions,       erythema or scars, no tenderness to percussion or                   palpation,   range of motion normal   Lungs:     Clear to auscultation,respirations regular, even and                   unlabored    Heart:    Regular rhythm and normal rate, normal S1 and S2, no            murmur, no gallop, no rub, no click   Breast Exam:    Deferred   Abdomen:     Normal bowel sounds, no masses, no organomegaly, soft        non-tender, non-distended, no guarding, no rebound                 tenderness   Genitalia:    Deferred   Extremities:   Swelling noted over base of left thumb.  No tenderness in snuff box noted. "   Pulses:   Pulses palpable and equal bilaterally   Skin:   No bleeding, bruising or rash   Lymph nodes:   No palpable adenopathy   Neurologic:   Cranial nerves 2 - 12 grossly intact, sensation intact, DTR        present and equal bilaterally           Results from last 7 days  Lab Units 07/14/18  0744   SODIUM mmol/L 140   POTASSIUM mmol/L 3.9   CHLORIDE mmol/L 110*   CO2 mmol/L 23.0   BUN mg/dL 10   CREATININE mg/dL 0.69   CALCIUM mg/dL 8.0*   GLUCOSE mg/dL 113*       Impression: Breast cancer with bone metastasis  Dysphagia  Dyspnea  Goals of care  Plan:  I will slightly increase patient's ketamine infusion at this time.  I will also get an x-ray of patient's left hand.        Nadir Ojeda,   07/19/18  10:55 AM

## 2018-07-19 NOTE — PROGRESS NOTES
Subjective     PROBLEM LIST:  1. [id number] invasive ductal carcinoma of the left breast, ER+, PA+, Her2 negative  A) presented with a palpable mass in the left breast as well as skin thickening.  biopsy showed multifocal low grade IDC.  Skin biopsy negative.  B) PET/CT 6/6/18 showed widespread bony metastatic disease  2. History of right breast cancer 2001, treated with lumpectomy and SLNB, ? CMF, and adjuvant hormonal therapy  3. Anxiety/depression  4. Rheumatoid arthritis  5. Asthma  6. CHF  7. COPD  8. Diabetes mellitus type 2  9. GERD  10. HTN  11. HL  12. CVA  13. Tobacco abuse  14. choriocarcinoma  15. Fibromyalgia  16. Atrial fibrillation    INTERVAL HISTORY:   Pain is a little better today.  Her hands feel stiff and achy.  Appetite isn't great.  Wants to try to walk a little more.    REVIEW OF SYSTEMS:  A 14 point review of systems was performed and is negative except as noted above.      Objective     Vitals:    07/18/18 1925 07/19/18 0340 07/19/18 0723 07/19/18 1151   BP: 107/59 132/79 118/81 136/78   BP Location: Right arm Right arm     Patient Position: Lying Lying     Pulse: 82 89 84 84   Resp: 18 18 18 18   Temp: 98.3 °F (36.8 °C) 98 °F (36.7 °C) 99.1 °F (37.3 °C) 99.4 °F (37.4 °C)   TempSrc: Oral Oral Oral Oral   SpO2:   91%    Weight:       Height:             General:  female in no acute distress  Neuro: alert and oriented  HEENT: sclera anicteric, oropharynx clear  Lymphatics: no cervical, supraclavicular, or axillary adenopathy  Cardiovascular: regular rate and rhythm, no murmurs  Lungs: clear to auscultation bilaterally  Abdomen: soft, nontender, nondistended.  No palpable organomegaly  Extremeties: no lower extremity edema  Psych: mood and affect appropriate            Assessment/Plan     Betsy Chi is a 62 y.o. year old female with metastatic breast cancer, admitted for pain management, dyspnea, evaluation of abdominal pain/vomiting.     Metastatic breast cancer: continue letrozole. Start  ibrance after she returns home.  Next zometa due in 3 months.     Pain: ketamine infusion. I think samarium may be a good longer term pain control option for her.    UTI: on cefdinir    PT consult for today - needs to be up and out of bed more.           Mary Mendoza MD  7/19/2018

## 2018-07-19 NOTE — NURSING NOTE
[facility] visit - 45 minutes - full body healing energy modality utilized by volunteer energy healer to assist patient with bone pain in left arm, wrist, and shoulder - which were areas of focus.  Ms. Chi stated that pain is related to breast cancer treatment and ongoing.  Following treatment, Ms. Chi stated she felt better afterward - no discomfort at that time in left arm, wrist, shoulder.  Ms. Chi shared in previous conversation her belief in energy practices for healing and was appreciative of the offering.  In addition, per patient preference, one drop lavender essential oil and one drop bergamot essential oil were place on 2x2 gauze and placed under pillow to facilitate comfort and relaxation.

## 2018-07-19 NOTE — NURSING NOTE
25 min. [facility] visit - energy healing modality treatment with lavender essential oil (one drop applied to 2x2 gauze, placed under pillow per patient preference).  Ms. Chi stated felt more comfortable, particularly her left arm, following treatment.

## 2018-07-19 NOTE — PROGRESS NOTES
Ms. Arthur's pain is getting better controlled.  I discussed Samarium-153 injection with her again today.  She informed me that she has urinary incontinence.  This will prevent us from offering this treatment to her.  The radionuclide is excreted through the urine.

## 2018-07-19 NOTE — PLAN OF CARE
Problem: Patient Care Overview  Goal: Individualization and Mutuality  Outcome: Ongoing (interventions implemented as appropriate)      Problem: Fall Risk (Adult)  Goal: Identify Related Risk Factors and Signs and Symptoms  Outcome: Outcome(s) achieved Date Met: 07/19/18      Problem: Palliative Care (Adult)  Goal: Identify Related Risk Factors and Signs and Symptoms  Outcome: Outcome(s) achieved Date Met: 07/19/18

## 2018-07-19 NOTE — PLAN OF CARE
Problem: Patient Care Overview  Goal: Plan of Care Review  Outcome: Ongoing (interventions implemented as appropriate)   07/19/18 0306   Coping/Psychosocial   Plan of Care Reviewed With patient   Plan of Care Review   Progress improving   OTHER   Outcome Summary Pt is much improved w/ ketamine infusion. Pain is well controlled and pt is resting, sleeping good portion of night. Pt is still able to be roused easily and VSS. Ate a full lunch box. Pt seems to be in better spirits w/ the new pain plan. No acute events overnight. Continuing to monitor. 7/19/18 @ 0310.       Problem: Skin Injury Risk (Adult)  Goal: Skin Health and Integrity  Outcome: Ongoing (interventions implemented as appropriate)      Problem: Fall Risk (Adult)  Goal: Absence of Fall  Outcome: Ongoing (interventions implemented as appropriate)      Problem: Palliative Care (Adult)  Goal: Maximized Comfort  Outcome: Ongoing (interventions implemented as appropriate)    Goal: Enhanced Quality of Life  Outcome: Ongoing (interventions implemented as appropriate)

## 2018-07-20 LAB
ANION GAP SERPL CALCULATED.3IONS-SCNC: 6 MMOL/L (ref 3–11)
BUN BLD-MCNC: 11 MG/DL (ref 9–23)
BUN/CREAT SERPL: 17.7 (ref 7–25)
CALCIUM SPEC-SCNC: 8.5 MG/DL (ref 8.7–10.4)
CHLORIDE SERPL-SCNC: 108 MMOL/L (ref 99–109)
CO2 SERPL-SCNC: 25 MMOL/L (ref 20–31)
CREAT BLD-MCNC: 0.62 MG/DL (ref 0.6–1.3)
DEPRECATED RDW RBC AUTO: 46.3 FL (ref 37–54)
ERYTHROCYTE [DISTWIDTH] IN BLOOD BY AUTOMATED COUNT: 13.6 % (ref 11.3–14.5)
GFR SERPL CREATININE-BSD FRML MDRD: 97 ML/MIN/1.73
GLUCOSE BLD-MCNC: 106 MG/DL (ref 70–100)
GLUCOSE BLDC GLUCOMTR-MCNC: 101 MG/DL (ref 70–130)
GLUCOSE BLDC GLUCOMTR-MCNC: 104 MG/DL (ref 70–130)
GLUCOSE BLDC GLUCOMTR-MCNC: 106 MG/DL (ref 70–130)
GLUCOSE BLDC GLUCOMTR-MCNC: 127 MG/DL (ref 70–130)
HCT VFR BLD AUTO: 37.8 % (ref 34.5–44)
HGB BLD-MCNC: 12.3 G/DL (ref 11.5–15.5)
MCH RBC QN AUTO: 30.2 PG (ref 27–31)
MCHC RBC AUTO-ENTMCNC: 32.5 G/DL (ref 32–36)
MCV RBC AUTO: 92.9 FL (ref 80–99)
PLATELET # BLD AUTO: 208 10*3/MM3 (ref 150–450)
PMV BLD AUTO: 10 FL (ref 6–12)
POTASSIUM BLD-SCNC: 4 MMOL/L (ref 3.5–5.5)
RBC # BLD AUTO: 4.07 10*6/MM3 (ref 3.89–5.14)
SODIUM BLD-SCNC: 139 MMOL/L (ref 132–146)
WBC NRBC COR # BLD: 5.82 10*3/MM3 (ref 3.5–10.8)

## 2018-07-20 PROCEDURE — 97161 PT EVAL LOW COMPLEX 20 MIN: CPT | Performed by: PHYSICAL THERAPIST

## 2018-07-20 PROCEDURE — 94799 UNLISTED PULMONARY SVC/PX: CPT

## 2018-07-20 PROCEDURE — 85027 COMPLETE CBC AUTOMATED: CPT | Performed by: NURSE PRACTITIONER

## 2018-07-20 PROCEDURE — 97530 THERAPEUTIC ACTIVITIES: CPT | Performed by: PHYSICAL THERAPIST

## 2018-07-20 PROCEDURE — 63710000001 DRONABINOL PER 2.5 MG: Performed by: FAMILY MEDICINE

## 2018-07-20 PROCEDURE — 25010000002 HEPARIN (PORCINE) PER 1000 UNITS: Performed by: FAMILY MEDICINE

## 2018-07-20 PROCEDURE — 99232 SBSQ HOSP IP/OBS MODERATE 35: CPT | Performed by: INTERNAL MEDICINE

## 2018-07-20 PROCEDURE — 82962 GLUCOSE BLOOD TEST: CPT

## 2018-07-20 PROCEDURE — 99232 SBSQ HOSP IP/OBS MODERATE 35: CPT | Performed by: NURSE PRACTITIONER

## 2018-07-20 PROCEDURE — 80048 BASIC METABOLIC PNL TOTAL CA: CPT | Performed by: NURSE PRACTITIONER

## 2018-07-20 RX ADMIN — CARVEDILOL 3.12 MG: 3.12 TABLET, FILM COATED ORAL at 18:12

## 2018-07-20 RX ADMIN — LORAZEPAM 0.5 MG: 0.5 TABLET ORAL at 11:36

## 2018-07-20 RX ADMIN — PANTOPRAZOLE SODIUM 40 MG: 40 TABLET, DELAYED RELEASE ORAL at 05:46

## 2018-07-20 RX ADMIN — CEFDINIR 300 MG: 300 CAPSULE ORAL at 08:47

## 2018-07-20 RX ADMIN — CARVEDILOL 3.12 MG: 3.12 TABLET, FILM COATED ORAL at 08:48

## 2018-07-20 RX ADMIN — DRONABINOL 5 MG: 2.5 CAPSULE ORAL at 20:14

## 2018-07-20 RX ADMIN — Medication 5 MCG/KG/MIN: at 11:36

## 2018-07-20 RX ADMIN — DRONABINOL 5 MG: 2.5 CAPSULE ORAL at 08:47

## 2018-07-20 RX ADMIN — TRIAMCINOLONE ACETONIDE: 1 CREAM TOPICAL at 08:48

## 2018-07-20 RX ADMIN — Medication 7 MCG/KG/MIN: at 22:31

## 2018-07-20 RX ADMIN — HEPARIN SODIUM 5000 UNITS: 5000 INJECTION, SOLUTION INTRAVENOUS; SUBCUTANEOUS at 14:55

## 2018-07-20 RX ADMIN — Medication 7 MCG/KG/MIN: at 12:49

## 2018-07-20 RX ADMIN — LETROZOLE 2.5 MG: 2.5 TABLET, FILM COATED ORAL at 08:47

## 2018-07-20 RX ADMIN — IPRATROPIUM BROMIDE AND ALBUTEROL SULFATE 3 ML: 2.5; .5 SOLUTION RESPIRATORY (INHALATION) at 21:06

## 2018-07-20 RX ADMIN — Medication 5 MCG/KG/MIN: at 02:07

## 2018-07-20 RX ADMIN — HEPARIN SODIUM 5000 UNITS: 5000 INJECTION, SOLUTION INTRAVENOUS; SUBCUTANEOUS at 05:46

## 2018-07-20 RX ADMIN — Medication 7 MCG/KG/MIN: at 16:18

## 2018-07-20 RX ADMIN — LORAZEPAM 0.5 MG: 0.5 TABLET ORAL at 05:46

## 2018-07-20 RX ADMIN — SENNOSIDES AND DOCUSATE SODIUM 2 TABLET: 8.6; 5 TABLET ORAL at 08:47

## 2018-07-20 RX ADMIN — Medication 5 MCG/KG/MIN: at 05:43

## 2018-07-20 RX ADMIN — SENNOSIDES AND DOCUSATE SODIUM 2 TABLET: 8.6; 5 TABLET ORAL at 20:14

## 2018-07-20 RX ADMIN — Medication 7 MCG/KG/MIN: at 19:38

## 2018-07-20 RX ADMIN — TRIAMCINOLONE ACETONIDE: 1 CREAM TOPICAL at 20:14

## 2018-07-20 RX ADMIN — HEPARIN SODIUM 5000 UNITS: 5000 INJECTION, SOLUTION INTRAVENOUS; SUBCUTANEOUS at 20:14

## 2018-07-20 RX ADMIN — ATORVASTATIN CALCIUM 20 MG: 20 TABLET, FILM COATED ORAL at 20:15

## 2018-07-20 RX ADMIN — LORAZEPAM 0.5 MG: 0.5 TABLET ORAL at 20:15

## 2018-07-20 RX ADMIN — LORAZEPAM 0.5 MG: 0.5 TABLET ORAL at 14:55

## 2018-07-20 RX ADMIN — CEFDINIR 300 MG: 300 CAPSULE ORAL at 20:15

## 2018-07-20 NOTE — PLAN OF CARE
Problem: Patient Care Overview  Goal: Plan of Care Review  Outcome: Ongoing (interventions implemented as appropriate)   07/20/18 3015   Coping/Psychosocial   Plan of Care Reviewed With patient   Plan of Care Review   Progress no change   OTHER   Outcome Summary ketamine infusion titrated to assist with pain. patient tearful and anxious at times. patient complains of SOA with ambulation o2 sats 93-94. encouraged to deep breath        Problem: Skin Injury Risk (Adult)  Goal: Identify Related Risk Factors and Signs and Symptoms  Outcome: Ongoing (interventions implemented as appropriate)    Goal: Skin Health and Integrity  Outcome: Ongoing (interventions implemented as appropriate)      Problem: Palliative Care (Adult)  Goal: Maximized Comfort  Outcome: Ongoing (interventions implemented as appropriate)    Goal: Enhanced Quality of Life  Outcome: Ongoing (interventions implemented as appropriate)

## 2018-07-20 NOTE — PROGRESS NOTES
Continued Stay Note  [facility]     Patient Name: Betsy Chi  MRN: [id number]  Today's Date: 7/20/2018    Admit Date: 7/11/2018          Discharge Plan     Row Name 07/20/18 1646       Plan    Plan Undetermined    Plan Comments Hospice consult received.  Plan to meet with pt/family to discuss d/c options with hospice services 7/23/18.  If can be of further assist please contact.....ext [phone].    Row Name 07/20/18 2058       Plan    Plan ongoing    Patient/Family in Agreement with Plan yes    Plan Comments Patient is not yet ready for discharge. Ketamine drip will likely be running over the weekend according to Dr Adams note and there is a new hospice consult as well. Currently the patient still hopes to return home when medically ready where she lives with her nephew - CM following - aw [phone]              Discharge Codes    No documentation.           Keira Hoffman RN

## 2018-07-20 NOTE — PLAN OF CARE
Problem: Patient Care Overview  Goal: Interprofessional Rounds/Family Conf  Outcome: Ongoing (interventions implemented as appropriate)  Palliative Team Attendance 1300. Da FERRIS,  Linda BASILIO, , Zack HOU CYRIL, Kelli GRAYSON, Keira Hoffman RN CYRIL,    07/20/18 1300   Interdisciplinary Rounds/Family Conf   Summary pt ambulated in [place] with PT. pt continues to have pain and anxiety. ketamine titrated for comfort. hospice consulted. no cpr or intubation.

## 2018-07-20 NOTE — PLAN OF CARE
Problem: Patient Care Overview  Goal: Plan of Care Review  Outcome: Ongoing (interventions implemented as appropriate)   07/20/18 1042   Coping/Psychosocial   Plan of Care Reviewed With patient   OTHER   Outcome Summary PT evaluation completed on this date. Pt transferred supine<-->sit modified independently, stood with SBA, and ambulated 228 feet x 2 with standing rest break using rw with CGA. Worked on standing balance standing outside on [employer]. Pt is normally very independent and will benefit from skilled PT to improve mobility and safety. Recommend home with assist at d/c.

## 2018-07-20 NOTE — THERAPY TREATMENT NOTE
Acute Care - Physical Therapy Initial Evaluation  [facility]     Patient Name: Betsy Chi  : 1955  MRN: [id number]  Today's Date: 2018   Onset of Illness/Injury or Date of Surgery: 18  Date of Referral to PT: 18  Referring Physician: Mary Mendoza MD      Admit Date: 2018    Visit Dx:     ICD-10-CM ICD-9-CM   1. Dysphagia, unspecified type R13.10 787.20   2. Impaired functional mobility, balance, gait, and endurance Z74.09 V49.89     Patient Active Problem List   Diagnosis   • Mixed anxiety depressive disorder   • Asthma   • Coronary arteriosclerosis in native artery   • Atrial fibrillation (CMS/HCC)   • Biliary dyskinesia   • History of Cerebral infarction (CT scan of brain this hospitalization normal)   • Chronic bronchitis (CMS/HCC)   • Chronic low back pain   • COPD (chronic obstructive pulmonary disease) (CMS/HCC)   • Chronic urinary tract infection   • Hepatic cirrhosis (CMS/HCC)   • Type 2 diabetes mellitus (CMS/HCC)   • Dyslipidemia   • Fibromyalgia   • Gastroesophageal reflux disease without esophagitis   • Hypertension   • Disorder of kidney   • Disorder of endocrine system   • Osteoarthritis   • Osteoporosis   • Pulmonary nodule right lower lobe medially (2 x 1.5 cm).   • Rheumatoid arthritis (CMS/HCC)   • Seasonal allergic rhinitis   • Biliary sludge   • Peptic ulcer of stomach   • Acute on chronic hypoxemic respiratory failure not requiring ventilatory support   • Invasive ductal carcinoma of breast, left (CMS/HCC)   • Bone metastases (CMS/HCC)   • Open angle with borderline findings and low glaucoma risk in both eyes   • Palpitations   • Chronic hypoxemic respiratory failure (CMS/HCC)   • Dyspnea   • Atypical chest pain   • Acute on chronic diastolic CHF (congestive heart failure) (CMS/HCC)   • Constipation     Past Medical History:   Diagnosis Date   • Arthritis    • Asthma    • Atrial fibrillation (CMS/HCC)    • Breast cancer (CMS/HCC)    • CHF (congestive heart  failure) (CMS/HCC)    • Chronic kidney disease (CKD)    • COPD (chronic obstructive pulmonary disease) (CMS/HCC)    • CVA (cerebral vascular accident) (CMS/HCC)    • Diabetes mellitus, type 2 (CMS/HCC)    • GERD (gastroesophageal reflux disease)    • Hypercholesteremia    • Hypertension    • Myocardial infarction    • Osteoarthritis    • Psoriatic arthritis (CMS/HCC)    • Pulmonary embolism (CMS/HCC)    • Rheumatoid arthritis (CMS/HCC)    • Sciatica    • Smoker    • UTI (urinary tract infection)      Past Surgical History:   Procedure Laterality Date   • BLADDER SURGERY     • BREAST BIOPSY     • BREAST LUMPECTOMY     • BRONCHOSCOPY N/A 5/9/2018    Procedure: BRONCHOSCOPY WITH ENDOBRONCHIAL ULTRASOUND AND NAVIGATION WITH FLUORO;  Surgeon: Nixon Mulligan MD;  Location: [facility] ENDOSCOPY;  Service: Pulmonary   • HYSTERECTOMY     • OOPHORECTOMY     • TUMOR REMOVAL      fallopian tube   • US GUIDED FINE NEEDLE ASPIRATION  5/3/2018        PT ASSESSMENT (last 12 hours)      Physical Therapy Evaluation     Row Name 07/20/18 1042          PT Evaluation Time/Intention    Subjective Information complains of;weakness;pain  -LM     Document Type evaluation  -LM     Mode of Treatment individual therapy;physical therapy  -LM     Patient Effort good  -LM     Comment Pt very tearful throughout evaluation re: diagnosis.  -LM     Row Name 07/20/18 1042          General Information    Patient Profile Reviewed? yes  -LM     Onset of Illness/Injury or Date of Surgery 07/11/18  -LM     Referring Physician Mary Mendoza MD  -LM     Patient Observations alert;cooperative;agree to therapy  -LM     General Observations of Patient Pt lying in bed.  Noted to have IV.  Very pleasant and agreeable to PT eval.  -LM     Prior Level of Function independent:;all household mobility;gait;ADL's  -LM     Equipment Currently Used at Home rollator;cane, straight   Does not use due to soreness of hands  -LM     Pertinent History of Current Functional  Problem Admitted with SOA and swelling.  Being treated for L Breast CA with bony mets.  Dx: UTI; BRET; Acute on Chronic Hypoxemia Respiratory Failure  -LM     Existing Precautions/Restrictions fall;oxygen therapy device and L/min;other (see comments)   Chemotherapy  -LM     Risks Reviewed patient:;LOB;increased discomfort  -LM     Benefits Reviewed patient:;improve function;increase independence;increase strength;increase balance;decrease pain  -LM     Row Name 07/20/18 1042          Relationship/Environment    Lives With other (see comments)   Nephew and his family  -LM     Row Name 07/20/18 1042          Resource/Environmental Concerns    Current Living Arrangements home/apartment/condo  -LM     Row Name 07/20/18 1042          Cognitive Assessment/Interventions    Additional Documentation Cognitive Assessment/Intervention (Group)  -LM     Row Name 07/20/18 1042          Cognitive Assessment/Intervention- PT/OT    Affect/Mental Status (Cognitive) sad/depressed affect  -LM     Orientation Status (Cognition) oriented x 4  -LM     Follows Commands (Cognition) WFL  -LM     Cognitive Function (Cognitive) safety deficit  -LM     Safety Deficit (Cognitive) mild deficit;safety precautions awareness  -LM     Personal Safety Interventions fall prevention program maintained;gait belt;muscle strengthening facilitated;nonskid shoes/slippers when out of bed  -LM     Row Name 07/20/18 1042          Bed Mobility Assessment/Treatment    Bed Mobility Assessment/Treatment supine-sit;sit-supine  -LM     Supine-Sit [place] (Bed Mobility) conditional independence  -LM     Sit-Supine [place] (Bed Mobility) conditional independence  -LM     Assistive Device (Bed Mobility) bed rails;head of bed elevated  -LM     Row Name 07/20/18 1042          Transfer Assessment/Treatment    Transfer Assessment/Treatment sit-stand transfer;stand-sit transfer  -LM     Sit-Stand [place] (Transfers) stand by assist;verbal cues  -      Stand-Sit [place] (Transfers) stand by assist;verbal cues  -LM     Row Name 07/20/18 1042          Sit-Stand Transfer    Assistive Device (Sit-Stand Transfers) walker, front-wheeled  -LM     Row Name 07/20/18 1042          Stand-Sit Transfer    Assistive Device (Stand-Sit Transfers) walker, front-wheeled  -LM     Row Name 07/20/18 1042          Gait/Stairs Assessment/Training    Gait/Stairs Assessment/Training gait/ambulation independence;gait/ambulation assistive device;distance ambulated  -LM     [place] Level (Gait) contact guard  -LM     Assistive Device (Gait) walker, front-wheeled  -LM     Distance in Feet (Gait) 228 feet x 2 with standing rest break in between  -LM     Pattern (Gait) swing-through  -LM     Deviations/Abnormal Patterns (Gait) samara decreased  -LM     Comment (Gait/Stairs) Educated on PLB with activity.  Pt ambulated without O2 - O2 sat noted to be 91% upon returning to room.  O2 placed back on and increased to 94%.  -     Row Name 07/20/18 1042          General ROM    GENERAL ROM COMMENTS BLE AROM WFL  -LM     Row Name 07/20/18 1042          MMT (Manual Muscle Testing)    Additional Documentation General Assessment (Manual Muscle Testing) (Group)  -     Row Name 07/20/18 1042          General Assessment (Manual Muscle Testing)    General Manual Muscle Testing (MMT) Assessment lower extremity strength deficits identified  -[facility] Name 07/20/18 1042          Lower Extremity (Manual Muscle Testing)    Comment, MMT: Lower Extremity BLE's - Hip flex - 4-/5; Knee flex/ext - 4-/5; Ankle DF - 4+/5  -     Row Name 07/20/18 1042          Motor Assessment/Intervention    Additional Documentation Balance (Group);Therapeutic Exercise (Group)  -LM     Row Name 07/20/18 1042          Therapeutic Exercise    13039 - PT Therapeutic Activity Minutes 10  -     Row Name 07/20/18 1042          Balance    Balance static sitting balance;static standing balance;dynamic sitting balance;dynamic  standing balance  -LM     Row Name 07/20/18 1042          Static Sitting Balance    Level of [place] (Unsupported Sitting, Static Balance) independent  -LM     Sitting Position (Unsupported Sitting, Static Balance) sitting on edge of bed  -     Time Able to Maintain Position (Unsupported Sitting, Static Balance) 3 to 4 minutes  -LM     Row Name 07/20/18 1042          Dynamic Sitting Balance    Level of [place], Reaches Outside Midline (Sitting, Dynamic Balance) supervision  -     Sitting Position, Reaches Outside Midline (Sitting, Dynamic Balance) sitting on edge of bed  -LM     Row Name 07/20/18 1042          Static Standing Balance    Level of [place] (Supported Standing, Static Balance) standby assist  -     Time Able to Maintain Position (Supported Standing, Static Balance) more than 5 minutes  -     Assistive Device Utilized (Supported Standing, Static Balance) rolling walker  -LM     Row Name 07/20/18 1042          Dynamic Standing Balance    Level of [place], Reaches Outside Midline (Standing, Dynamic Balance) contact guard assist  -     Time Able to Maintain Position, Reaches Outside Midline (Standing, Dynamic Balance) more than 5 minutes  -     Comment, Resists Mild Perturbations (Sitting, Dynamic Balance) PT took pt outside to [facility] and worked on standing balance activities for ~10 minutes.  -LM     Row Name 07/20/18 1042          Sensory Assessment/Intervention    Sensory General Assessment no sensation deficits identified  -LM     Row Name 07/20/18 1042          Vision Assessment/Intervention    Visual Impairment/Limitations blurry vision   Unable to afford glasses  -LM     Row Name 07/20/18 1042          Pain Assessment    Additional Documentation Pain Scale: Numbers Pre/Post-Treatment (Group)  -LM     Row Name 07/20/18 1042          Pain Scale: Numbers Pre/Post-Treatment    Pain Scale: Numbers, Pretreatment 7/10  -     Pain Scale: Numbers, Post-Treatment 8/10  -LM      Pain Location - Side Bilateral  -LM     Pain Location - Orientation lower  -LM     Pain Location back  -LM     Pain Intervention(s) Repositioned;Ambulation/increased activity   RN notified  -LM     Row Name 07/20/18 1042          Plan of Care Review    Plan of Care Reviewed With patient  -LM     Row Name 07/20/18 1042          Physical Therapy Clinical Impression    Date of Referral to PT 07/19/18  -LM     PT Diagnosis (PT Clinical Impression) Impaired functional mobility, balance, gait, and endurance  -LM     Criteria for Skilled Interventions Met (PT Clinical Impression) yes;treatment indicated  -LM     Rehab Potential (PT Clinical Summary) good, to achieve stated therapy goals  -LM     Care Plan Review (PT) evaluation/treatment results reviewed;care plan/treatment goals reviewed;risks/benefits reviewed;current/potential barriers reviewed;patient/other agree to care plan  -LM     Row Name 07/20/18 1042          Vital Signs    Pre Systolic BP Rehab 118  -LM     Pre Treatment Diastolic BP 64  -LM     Pretreatment Heart Rate (beats/min) 82  -LM     Posttreatment Heart Rate (beats/min) 84  -LM     Pre SpO2 (%) 96  -LM     O2 Delivery Pre Treatment supplemental O2  -LM     Intra SpO2 (%) 91   s/p 228 feet of amb x 2  -LM     O2 Delivery Intra Treatment room air  -LM     Post SpO2 (%) 94  -LM     O2 Delivery Post Treatment supplemental O2  -LM     Pre Patient Position Supine  -LM     Intra Patient Position Standing  -LM     Post Patient Position Supine  -LM     Row Name 07/20/18 1042          Physical Therapy Goals    Transfer Goal Selection (PT) transfer, PT goal 1  -LM     Gait Training Goal Selection (PT) gait training, PT goal 1  -LM     Balance Goal Selection (PT) balance, PT goal 1  -LM     Additional Documentation Balance Goal Selection (PT) (Row)  -     Row Name 07/20/18 1042          Transfer Goal 1 (PT)    Activity/Assistive Device (Transfer Goal 1, PT) bed-to-chair/chair-to-bed  -LM     [place]  Level/Cues Needed (Transfer Goal 1, PT) independent;conditional independence   AAD if needed  -LM     Time Frame (Transfer Goal 1, PT) long term goal (LTG);2 weeks  -LM     Row Name 07/20/18 1042          Gait Training Goal 1 (PT)    Activity/Assistive Device (Gait Training Goal 1, PT) gait (walking locomotion)  -LM     [place] Level (Gait Training Goal 1, PT) independent;conditional independence   AAD if needed  -LM     Distance (Gait Goal 1, PT) 400 feet  -LM     Time Frame (Gait Training Goal 1, PT) long term goal (LTG);2 weeks  -LM     Row Name 07/20/18 1042          Balance Goal 1 (PT)    Activity/Assistive Device (Balance Goal 1, PT) standing, dynamic  -LM     [place] Level/Cues Needed (Balance Goal 1, PT) independent;conditional independence   AAD if needed  -LM     Time Frame (Balance Goal 1, PT) long term goal (LTG);2 weeks  -LM     Row Name 07/20/18 1042          Positioning and Restraints    Pre-Treatment Position in bed  -LM     Post Treatment Position bed  -LM     In Bed supine;call light within reach;encouraged to call for assist;notified nsg  -LM     Row Name 07/20/18 1042          Living Environment    Home Accessibility --   No stairs  -LM       User Key  (r) = Recorded By, (t) = Taken By, (c) = Cosigned By    Initials Name Provider Type    LARRY Gonzales, PT Physical Therapist              PT Recommendation and Plan  Anticipated Discharge Disposition (PT): home with assist  Planned Therapy Interventions (PT Eval): balance training, bed mobility training, gait training, home exercise program, neuromuscular re-education, patient/family education, postural re-education, ROM (range of motion), strengthening, stretching, transfer training  Therapy Frequency (PT Clinical Impression): daily  Outcome Summary/Treatment Plan (PT)  Anticipated Discharge Disposition (PT): home with assist  Plan of Care Reviewed With: patient  Outcome Summary: PT evaluation completed on this date.  Pt transferred  supine<-->sit modified independently, stood with SBA, and ambulated 228 x 2 with standing rest break using rw with CGA.  Worked on standing balance standing outside on [employer].  Pt is normally very independent and will benefit from skilled PT to improve mobility and safety.  Recommend home with assist at d/c.         Time Calculation:         PT Charges     Row Name 07/20/18 1042             Time Calculation    Start Time 1042  -LM      PT Received On 07/20/18  -LM      PT Goal Re-Cert Due Date 07/30/18  -LM         Timed Charges    57837 - PT Therapeutic Activity Minutes 10  -LM        User Key  (r) = Recorded By, (t) = Taken By, (c) = Cosigned By    Initials Name Provider Type    LM Daphne Gonzales PT Physical Therapist        Therapy Suggested Charges     Code   Minutes Charges    16377 (CPT®) Hc Pt Neuromusc Re Education Ea 15 Min      38320 (CPT®) Hc Pt Ther Proc Ea 15 Min      59803 (CPT®) Hc Gait Training Ea 15 Min      85037 (CPT®) Hc Pt Therapeutic Act Ea 15 Min 10 1    58021 (CPT®) Hc Pt Manual Therapy Ea 15 Min      11015 (CPT®) Hc Pt Iontophoresis Ea 15 Min      85304 (CPT®) Hc Pt Elec Stim Ea-Per 15 Min      77488 (CPT®) Hc Pt Ultrasound Ea 15 Min      48240 (CPT®) Hc Pt Self Care/Mgmt/Train Ea 15 Min      Total  10 1        Therapy Charges for Today     Code Description Service Date Service Provider Modifiers Qty    32448451588 HC PT THERAPEUTIC ACT EA 15 MIN 7/20/2018 Daphne Gonzales, PT GP 1    [id number] HC PT EVAL LOW COMPLEXITY 4 7/20/2018 Daphne Gonzales, PT GP 1                 Daphne Gonzales PT  7/20/2018

## 2018-07-20 NOTE — PROGRESS NOTES
Continued Stay Note  [facility]     Patient Name: Betsy Chi  MRN: [id number]  Today's Date: 7/20/2018    Admit Date: 7/11/2018          Discharge Plan     Row Name 07/20/18 1435       Plan    Plan ongoing    Patient/Family in Agreement with Plan yes    Plan Comments Patient is not yet ready for discharge. Ketamine drip will likely be running over the weekend according to Dr Adams note and there is a new hospice consult as well. Currently the patient still hopes to return home when medically ready where she lives with her nephew - CM following - aw [id number]              Discharge Codes    No documentation.           Isatu Murcia RN

## 2018-07-20 NOTE — PROGRESS NOTES
"    [facility] Medicine Services  PROGRESS NOTE    Patient Name: Betsy Chi  : 1955  MRN: [id number]    Date of Admission: 2018  Length of Stay: 9  Primary Care Physician: PRAFUL Wheatley    Subjective   Subjective     CC: f/u n/v and UTI    HPI:  Pt is seen resting up in bed in mild appearing discomfort.  Quietly crying on the phone.  Appears a little more relaxed and comfortable today.  Pt reports she may be feeling just a little better, but still hurts.  Pain rated 7/10 all over.  Tolerating diet but poor appetite and intermittent nausea but no vomiting.  Awaiting \"a treatment with a correa cath\" hopefully today or tomorrow.  Awaiting Rad/onc recs.  No new issues.     Review of Systems  Gen- No fevers, chills  CV- No chest pain, palpitations  Resp- No cough, dyspnea  GI-  No V/D, abd pain, +nausea intermittently    Otherwise ROS is negative except as mentioned in the HPI.    Objective   Objective     Vital Signs:   Temp:  [97.7 °F (36.5 °C)-99.4 °F (37.4 °C)] 97.8 °F (36.6 °C)  Heart Rate:  [72-94] 94  Resp:  [18] 18  BP: (115-136)/(56-78) 118/64        Physical Exam:  Constitutional: No acute distress, awake, alert, appears a little more comfortable today but is on the phone with family and crying.  Sitting up in bed.  No visitors at bs.    HENT: NCAT, mucous membranes moist  Respiratory: Clear to auscultation bilaterally A/P, respiratory effort normal on 2LNC and sats are 95%  Cardiovascular: RRR, no murmurs, rubs, or gallops, palpable pedal pulses bilaterally  Gastrointestinal: Positive bowel sounds, soft, nontender, nondistended.  Obese abd  Musculoskeletal: No bilateral ankle edema.  ESPARZA spont  Psychiatric: Appropriate affect/ tearful, cooperative and calm  Neurologic: Oriented x 3, strength symmetric in all extremities, Cranial Nerves grossly intact to confrontation, speech clear and appropriate.  Follows commands   Skin: No rashes  Exam unchanged c/t " 7/19    Results Reviewed:  I have personally reviewed current lab, radiology, and data and agree.      Results from last 7 days  Lab Units 07/20/18  0650   WBC 10*3/mm3 5.82   HEMOGLOBIN g/dL 12.3   HEMATOCRIT % 37.8   PLATELETS 10*3/mm3 208       Results from last 7 days  Lab Units 07/20/18  0650 07/14/18  0744   SODIUM mmol/L 139 140   POTASSIUM mmol/L 4.0 3.9   CHLORIDE mmol/L 108 110*   CO2 mmol/L 25.0 23.0   BUN mg/dL 11 10   CREATININE mg/dL 0.62 0.69   GLUCOSE mg/dL 106* 113*   CALCIUM mg/dL 8.5* 8.0*     Estimated Creatinine Clearance: 93.1 mL/min (by C-G formula based on SCr of 0.62 mg/dL).  No results found for: BNP    Microbiology Results Abnormal     Procedure Component Value - Date/Time    Urine Culture - Urine, [[id number]]  (Abnormal)  (Susceptibility) Collected:  07/13/18 1636    Lab Status:  Final result Specimen:  Urine from Urine, Clean Catch Updated:  07/15/18 1032     Urine Culture >100,000 CFU/mL Escherichia coli (A)    Susceptibility      Escherichia coli     CHAMP     Ampicillin <=8 ug/ml Susceptible     Ampicillin + Sulbactam <=8/4 ug/ml Susceptible     Aztreonam <=8 ug/ml Susceptible     Cefepime <=8 ug/ml Susceptible     Cefotaxime <=2 ug/ml Susceptible     Ceftriaxone <=8 ug/ml Susceptible     Cefuroxime sodium <=4 ug/ml Susceptible     Cephalothin 16 ug/ml Intermediate     Ertapenem <=1 ug/ml Susceptible     Gentamicin <=4 ug/ml Susceptible     Levofloxacin <=2 ug/ml Susceptible     Meropenem <=1 ug/ml Susceptible     Nitrofurantoin <=32 ug/ml Susceptible     Piperacillin + Tazobactam <=16 ug/ml Susceptible     Tetracycline <=4 ug/ml Susceptible     Tobramycin <=4 ug/ml Susceptible     Trimethoprim + Sulfamethoxazole <=2/38 ug/ml Susceptible                        Results for orders placed during the hospital encounter of 07/11/18   Adult Transthoracic Echo Complete W/ Cont if Necessary Per Protocol    Narrative · Left ventricular systolic function is normal. Estimated EF = 65%.  · Left  ventricular wall thickness is consistent with mild concentric   hypertrophy.  · Left ventricular diastolic dysfunction (grade II) consistent with   pseudonormalization.  · Estimated right ventricular systolic pressure from tricuspid   regurgitation is normal (<35 mmHg).          I have reviewed the medications.    Assessment/Plan   Assessment / Plan     Hospital Problem List     * (Principal)Acute on chronic hypoxemic respiratory failure not requiring ventilatory support    Mixed anxiety depressive disorder    Atrial fibrillation (CMS/HCC)    COPD (chronic obstructive pulmonary disease) (CMS/HCC)    Type 2 diabetes mellitus (CMS/HCC)    Dyslipidemia    Hypertension    Pulmonary nodule right lower lobe medially (2 x 1.5 cm).    Overview Signed 5/22/2017  1:30 PM by Janine Mckeon PA-C     Description: A.  CT scan of the chest February 11 thousand 16 reports a noncalcified 8.5-9 mm nodule in the medial portion of the right lower lobe.         Rheumatoid arthritis (CMS/HCC)    Invasive ductal carcinoma of breast, left (CMS/HCC)    Chronic hypoxemic respiratory failure (CMS/HCC)    Overview Signed 7/12/2018  2:01 PM by Blade Taylor MD     2Lnc chronically         Dyspnea    Atypical chest pain    Acute on chronic diastolic CHF (congestive heart failure) (CMS/HCC)    Constipation         Brief Hospital Course to date:  Betsy Chi is a 62 y.o. female w/ metastatic breast cancer, copd, chronic 2Lnc use, ? Hx afib, dm2 ra presented as direct admisison from dr. centeno office for multiple complaints including diffuse pain, dyspnea.      Assessment & Plan:  - Abdominal pain and vomiting, etiology unknown, continue supportive therapy as below. IVF, antiemetics  ---slowly improving, now with intermittent nausea, no vomiting, but poor appetite     - BRET, suspect this is pre-renal, sec to n/v, Cr baseline is 0.7-0.8  Resolved after IVF.  No labs since 7/14.    Stable per this am labs     - Ecoli UTI, s/p merrem, now  "on cefdinir  Improved     - Metastatic breast CA , Dr Mendoza following, discussed her pain issue, will consult palliative to assist, continue letrozole, s/p zometa 7/12, plan to start ibrance once she returns home  --rad/onc, heme/onc and palliative following.  Possible treatment while inpt.  Awaiting recs.     - Suspected dysphagia, ?pharyngeal ,SLP consulted to evaluate, continue protonix  - Acute respiratory failure, suspect volume overload, echo normal ef, diastolic dysfunction, ct angio no pulm embolism, continue prn diuresis, O2 supp, hold on steroids  Improved     - Back pain with diffuse \"bone pain\", suspect this is sec to bony mets, palliative consulted and assisting. Palliative started on Ketamine infusion  for pain control.   Pt report a little improvement noted.     - Cards & heme-onc & palliative following (no ischemic evaluation, treat medically), suspected Afib per cards notes (data deficient)  - Continue bowel regimen (miralax bid, senokot bid, prn dulcolax) as constipation likely causing some discomfort  - steroid cream to rash low back     DVT Prophylaxis: [facility]     CODE STATUS:   Code Status and Medical Interventions:   Ordered at: 07/11/18 1254     Level Of Support Discussed With:    Patient     Code Status:    CPR     Medical Interventions (Level of Support Prior to Arrest):    Full       Disposition: anticipate d/c home once pain is reasonably controlled, ??TBD    Electronically signed by PRAFUL Murdock, 07/20/18, 11:25 AM.    "

## 2018-07-20 NOTE — PROGRESS NOTES
Palliative Care Progress Note    Date of Admission: 7/11/2018    Subjective:  Staff reports patient was able to get up and walk around the floor today.  Patient states that she feels that she may have overdone it a little bit before that she may be some slightly improved.  Current Code Status     Date Active Code Status Order ID Comments User Context       7/11/2018 12:54 PM CPR [id number]  Ania Menesesfrances, DO Inpatient       Questions for Current Code Status     Question Answer Comment    Code Status CPR     Medical Interventions (Level of Support Prior to Arrest) Full     Level Of Support Discussed With Patient         No current facility-administered medications on file prior to encounter.      Current Outpatient Prescriptions on File Prior to Encounter   Medication Sig Dispense Refill   • albuterol (PROVENTIL HFA;VENTOLIN HFA) 108 (90 Base) MCG/ACT inhaler Inhale 2 puffs Every 4 (Four) Hours As Needed for Wheezing. 1 inhaler 2   • aluminum acetate (DOMEBORO) pack packet Apply  topically 3 (Three) Times a Day As Needed (psoriatic arthritis). 100 each 6   • amLODIPine (NORVASC) 10 MG tablet TAKE ONE TABLET BY MOUTH DAILY 30 tablet 0   • atorvastatin (LIPITOR) 20 MG tablet Take 1 tablet by mouth Every Night. 90 tablet 0   • carvedilol (COREG) 12.5 MG tablet TAKE ONE TABLET BY MOUTH TWICE A DAY WITH MEALS 60 tablet 0   • Cranberry 600 MG tablet Take 1 tablet by mouth Daily.     • glucose blood test strip Use as instructed 100 each 12   • glucose monitor monitoring kit 1 each 3 (Three) Times a Day As Needed (hypo/hyperglycemia). 1 each 3   • Lancets (ACCU-CHEK SOFT TOUCH) lancets Three times daily and as needed 100 each 12   • letrozole (FEMARA) 2.5 MG tablet Take 1 tablet by mouth Daily. 30 tablet 11   • ondansetron (ZOFRAN) 8 MG tablet Take 1 tablet by mouth 3 (Three) Times a Day As Needed for Nausea or Vomiting. 30 tablet 5   • pantoprazole (PROTONIX) 40 MG EC tablet Take 1 tablet by mouth Daily. 90 tablet 2  "      ketamine (KETALAR) infusion 7 mcg/kg/min Last Rate: 7 mcg/kg/min (07/20/18 1249)     •  acetaminophen  •  bisacodyl  •  dextrose  •  dextrose  •  diphenhydrAMINE **OR** diphenhydrAMINE  •  glucagon (human recombinant)  •  ipratropium-albuterol  •  LORazepam  •  ondansetron  •  polyethylene glycol  •  sodium chloride    Objective: /81 (BP Location: Right arm, Patient Position: Lying)   Pulse 91   Temp 98 °F (36.7 °C) (Oral)   Resp 18   Ht 160 cm (62.99\")   Wt 82.1 kg (181 lb)   SpO2 91%   BMI 32.07 kg/m²      Intake/Output Summary (Last 24 hours) at 07/20/18 1354  Last data filed at 07/20/18 0854   Gross per 24 hour   Intake              490 ml   Output              800 ml   Net             -310 ml     Physical Exam:      General Appearance:    Alert, cooperative, At times she is tearful during the exam    Head:    Normocephalic, without obvious abnormality, atraumatic   Eyes:            Lids and lashes normal, conjunctivae and sclerae normal, no   icterus, no pallor, corneas clear, PERRLA   Ears:    Ears appear intact with no abnormalities noted   Throat:   No oral lesions, no thrush, oral mucosa moist   Neck:   No adenopathy, supple, trachea midline, no thyromegaly, no     carotid bruit, no JVD   Back:     No kyphosis present, no scoliosis present, no skin lesions,       erythema or scars, no tenderness to percussion or                   palpation,   range of motion normal   Lungs:     Clear to auscultation,respirations regular, even and                   unlabored    Heart:    Regular rhythm and normal rate, normal S1 and S2, no            murmur, no gallop, no rub, no click   Breast Exam:    Deferred   Abdomen:     Normal bowel sounds, no masses, no organomegaly, soft        non-tender, non-distended, no guarding, no rebound                 tenderness   Genitalia:    Deferred   Extremities:   Swelling noted over base of left thumb.  No tenderness in snuff box noted.   Pulses:   Pulses palpable " and equal bilaterally   Skin:   No bleeding, bruising or rash   Lymph nodes:   No palpable adenopathy   Neurologic:   Cranial nerves 2 - 12 grossly intact, sensation intact, DTR        present and equal bilaterally       Results from last 7 days  Lab Units 07/20/18  0650   WBC 10*3/mm3 5.82   HEMOGLOBIN g/dL 12.3   HEMATOCRIT % 37.8   PLATELETS 10*3/mm3 208       Results from last 7 days  Lab Units 07/20/18  0650   SODIUM mmol/L 139   POTASSIUM mmol/L 4.0   CHLORIDE mmol/L 108   CO2 mmol/L 25.0   BUN mg/dL 11   CREATININE mg/dL 0.62   CALCIUM mg/dL 8.5*   GLUCOSE mg/dL 106*       Impression: Breast cancer with bone metastasis  Dysphagia  Dyspnea  Goals of care  Plan:  Again we will slightly increase the patient's ketamine infusion with plans of continuing it throughout the weekend.  As far as home therapy goes we will consider oral ketamine.    I did talk to patient about CODE STATUS and after discussion the patient agreed to being a DNR.  I did also talk to her about hospice at home she is interested in at least talking to the hospice case manager.          Nadir Ojeda,   07/20/18  1:54 PM

## 2018-07-20 NOTE — PROGRESS NOTES
Subjective     PROBLEM LIST:  1. mW2P0H3 invasive ductal carcinoma of the left breast, ER+, MD+, Her2 negative  A) presented with a palpable mass in the left breast as well as skin thickening.  biopsy showed multifocal low grade IDC.  Skin biopsy negative.  B) PET/CT 6/6/18 showed widespread bony metastatic disease  2. History of right breast cancer 2001, treated with lumpectomy and SLNB, ? CMF, and adjuvant hormonal therapy  3. Anxiety/depression  4. Rheumatoid arthritis  5. Asthma  6. CHF  7. COPD  8. Diabetes mellitus type 2  9. GERD  10. HTN  11. HL  12. CVA  13. Tobacco abuse  14. choriocarcinoma  15. Fibromyalgia  16. Atrial fibrillation    INTERVAL HISTORY:   Had some increased pain yesterday but better with medicine titration.  Was told she couldn't do samarium because of urinary incontinence.      REVIEW OF SYSTEMS:  A 14 point review of systems was performed and is negative except as noted above.      Objective     Vitals:    07/19/18 2355 07/20/18 0100 07/20/18 0340 07/20/18 0706   BP: 117/66  115/73 118/64   BP Location: Right arm  Right arm Right arm   Patient Position: Lying  Lying Lying   Pulse: 81 74 72 86   Resp: 18  18 18   Temp: 97.9 °F (36.6 °C)  97.7 °F (36.5 °C) 97.8 °F (36.6 °C)   TempSrc: Oral  Oral Oral   SpO2:  92%  95%   Weight:       Height:             General:  female in no acute distress  Neuro: alert and oriented  HEENT: sclera anicteric, oropharynx clear  Lymphatics: no cervical, supraclavicular, or axillary adenopathy  Cardiovascular: regular rate and rhythm, no murmurs  Lungs: clear to auscultation bilaterally  Abdomen: soft, nontender, nondistended.  No palpable organomegaly  Extremeties: no lower extremity edema  Psych: mood and affect appropriate            Assessment/Plan     Betsy Chi is a 62 y.o. year old female with metastatic breast cancer, admitted for pain management, dyspnea, evaluation of abdominal pain/vomiting.     Metastatic breast cancer: continue letrozole.  Start ibrance after she returns home.  Next zometa due in 3 months.     Pain: ketamine infusion.she has had some benefit from this but unclear what she will be able to transition to at home.    UTI: on cefdinir    PT consult for ambulation.      I am away next week.  Will have one of my partners check on her if she is still inpatient.           Mary Mendoza MD  7/20/2018

## 2018-07-21 LAB
GLUCOSE BLDC GLUCOMTR-MCNC: 101 MG/DL (ref 70–130)
GLUCOSE BLDC GLUCOMTR-MCNC: 95 MG/DL (ref 70–130)

## 2018-07-21 PROCEDURE — 94799 UNLISTED PULMONARY SVC/PX: CPT

## 2018-07-21 PROCEDURE — 82962 GLUCOSE BLOOD TEST: CPT

## 2018-07-21 PROCEDURE — 99233 SBSQ HOSP IP/OBS HIGH 50: CPT | Performed by: INTERNAL MEDICINE

## 2018-07-21 PROCEDURE — 63710000001 DRONABINOL PER 2.5 MG: Performed by: FAMILY MEDICINE

## 2018-07-21 PROCEDURE — 99231 SBSQ HOSP IP/OBS SF/LOW 25: CPT | Performed by: INTERNAL MEDICINE

## 2018-07-21 PROCEDURE — 25010000002 HEPARIN (PORCINE) PER 1000 UNITS: Performed by: FAMILY MEDICINE

## 2018-07-21 PROCEDURE — 94760 N-INVAS EAR/PLS OXIMETRY 1: CPT

## 2018-07-21 RX ORDER — SAL ACID/UREA/PETROLATUM,WHITE 5 %-10 %
1 OINTMENT (GRAM) TOPICAL 3 TIMES DAILY PRN
Status: DISCONTINUED | OUTPATIENT
Start: 2018-07-21 | End: 2018-07-24 | Stop reason: HOSPADM

## 2018-07-21 RX ADMIN — SENNOSIDES AND DOCUSATE SODIUM 2 TABLET: 8.6; 5 TABLET ORAL at 21:01

## 2018-07-21 RX ADMIN — CARVEDILOL 3.12 MG: 3.12 TABLET, FILM COATED ORAL at 08:19

## 2018-07-21 RX ADMIN — TRIAMCINOLONE ACETONIDE: 1 CREAM TOPICAL at 21:01

## 2018-07-21 RX ADMIN — LORAZEPAM 0.5 MG: 0.5 TABLET ORAL at 21:01

## 2018-07-21 RX ADMIN — Medication 7 MCG/KG/MIN: at 12:07

## 2018-07-21 RX ADMIN — HEPARIN SODIUM 5000 UNITS: 5000 INJECTION, SOLUTION INTRAVENOUS; SUBCUTANEOUS at 06:32

## 2018-07-21 RX ADMIN — IPRATROPIUM BROMIDE AND ALBUTEROL SULFATE 3 ML: 2.5; .5 SOLUTION RESPIRATORY (INHALATION) at 21:15

## 2018-07-21 RX ADMIN — ATORVASTATIN CALCIUM 20 MG: 20 TABLET, FILM COATED ORAL at 21:01

## 2018-07-21 RX ADMIN — DRONABINOL 5 MG: 2.5 CAPSULE ORAL at 08:19

## 2018-07-21 RX ADMIN — CARVEDILOL 3.12 MG: 3.12 TABLET, FILM COATED ORAL at 17:15

## 2018-07-21 RX ADMIN — LETROZOLE 2.5 MG: 2.5 TABLET, FILM COATED ORAL at 08:19

## 2018-07-21 RX ADMIN — HEPARIN SODIUM 5000 UNITS: 5000 INJECTION, SOLUTION INTRAVENOUS; SUBCUTANEOUS at 21:01

## 2018-07-21 RX ADMIN — SENNOSIDES AND DOCUSATE SODIUM 2 TABLET: 8.6; 5 TABLET ORAL at 08:19

## 2018-07-21 RX ADMIN — DRONABINOL 5 MG: 2.5 CAPSULE ORAL at 21:01

## 2018-07-21 RX ADMIN — LORAZEPAM 0.5 MG: 0.5 TABLET ORAL at 06:32

## 2018-07-21 RX ADMIN — LORAZEPAM 0.5 MG: 0.5 TABLET ORAL at 13:33

## 2018-07-21 RX ADMIN — Medication 7 MCG/KG/MIN: at 01:02

## 2018-07-21 RX ADMIN — Medication 7 MCG/KG/MIN: at 03:49

## 2018-07-21 RX ADMIN — Medication 7 MCG/KG/MIN: at 09:36

## 2018-07-21 RX ADMIN — HEPARIN SODIUM 5000 UNITS: 5000 INJECTION, SOLUTION INTRAVENOUS; SUBCUTANEOUS at 13:33

## 2018-07-21 RX ADMIN — Medication 7 MCG/KG/MIN: at 06:49

## 2018-07-21 RX ADMIN — TRIAMCINOLONE ACETONIDE: 1 CREAM TOPICAL at 08:19

## 2018-07-21 RX ADMIN — PANTOPRAZOLE SODIUM 40 MG: 40 TABLET, DELAYED RELEASE ORAL at 06:32

## 2018-07-21 NOTE — PLAN OF CARE
Problem: Patient Care Overview  Goal: Individualization and Mutuality  Outcome: Ongoing (interventions implemented as appropriate)      Problem: Skin Injury Risk (Adult)  Goal: Identify Related Risk Factors and Signs and Symptoms  Outcome: Ongoing (interventions implemented as appropriate)

## 2018-07-21 NOTE — PROGRESS NOTES
"Palliative Care Progress Note    Date of Admission: 7/11/2018    Subjective:    Denies pain at present.  Says she actually had a good night.  Does, however, report crying all morning and doesn't know why.      Developed nausea and double vision so Ketamine infusion on hold since about 12:30.  Pt worried dose is too high.  Feeling better now (time of visit around 1400).  Ate soup and jello for lunch w/o difficulty.      Current Code Status     Date Active Code Status Order ID Comments User Context       7/11/2018 12:54 PM CPR [id number]  Ania De Leon,  Inpatient       Questions for Current Code Status     Question Answer Comment    Code Status CPR     Medical Interventions (Level of Support Prior to Arrest) Full     Level Of Support Discussed With Patient         Meds reviewed: ativan Q8, marinol BID    ketamine (KETALAR) infusion 7 mcg/kg/min Last Rate: Stopped (07/21/18 1220)       Objective: /75 (BP Location: Right arm, Patient Position: Sitting)   Pulse 74   Temp 98 °F (36.7 °C) (Oral)   Resp 17   Ht 160 cm (62.99\")   Wt 82.1 kg (181 lb)   SpO2 (!) 89%   BMI 32.07 kg/m²      Intake/Output Summary (Last 24 hours) at 07/21/18 1947  Last data filed at 07/21/18 1302   Gross per 24 hour   Intake              150 ml   Output                0 ml   Net              150 ml     Physical Exam:  Gen: Chronically ill appearing female, in bed, looks unhappy  Head/neck: NC/AT, trachea midline  EENT: EOMI, patent nares, mucous membranes moist  Resp: Clear to auscultation bilaterally, respiratory effort normal   CV: RRR, no murmur  Abd: Soft, nontender, nondistended  Ext: No bilateral ankle edema  Skin: warm, dry, psoriasis noted on hands  Neuro: Awake, alert, no focal deficit, no myoclonus  Psychiatric: Congruent affect - sad, worried      Results from last 7 days  Lab Units 07/20/18  0650   WBC 10*3/mm3 5.82   HEMOGLOBIN g/dL 12.3   HEMATOCRIT % 37.8   PLATELETS 10*3/mm3 208       Results from last 7 days  Lab " Units 07/20/18  0650   SODIUM mmol/L 139   POTASSIUM mmol/L 4.0   CHLORIDE mmol/L 108   CO2 mmol/L 25.0   BUN mg/dL 11   CREATININE mg/dL 0.62   CALCIUM mg/dL 8.5*   GLUCOSE mg/dL 106*       Impression:   61yo F with Breast cancer metastatic to bone    Symptoms:  Back pain and diffuse Bone pain  Nausea  Double vision, resolved  Dysphagia  Dyspnea  Depression, mood lability    Plan:    -Acute double vision resolved since ketamine infusion held.  Pt asks to hold off resuming infusion for now.  -If pain exacerbation today, will resume infusion at lower dose.    -If pt tolerates pain w/o ketamine infusion, will consider going ahead and initiating oral ketamine tomorrow in anticipation of therapy for home.  -Code status DNR per discussion with Dr. Ojeda.  Hospice also consulted to provide pt about home care.      Mesha Arguello MD  07/21/18  7:47 PM

## 2018-07-21 NOTE — PLAN OF CARE
Problem: Patient Care Overview  Goal: Plan of Care Review  Outcome: Ongoing (interventions implemented as appropriate)   07/21/18 0419   Coping/Psychosocial   Plan of Care Reviewed With patient   Plan of Care Review   Progress no change   OTHER   Outcome Summary rested during the night continues on ketamine, rating pain 4 out of 10. but patient slept most of the night.        Problem: Fall Risk (Adult)  Goal: Absence of Fall  Outcome: Ongoing (interventions implemented as appropriate)      Problem: Palliative Care (Adult)  Goal: Maximized Comfort  Outcome: Ongoing (interventions implemented as appropriate)    Goal: Enhanced Quality of Life  Outcome: Ongoing (interventions implemented as appropriate)

## 2018-07-21 NOTE — PROGRESS NOTES
"S: This morning pain seems to be better controlled.  However she has significant emotional lability.  She is been crying all morning.      Past medical history, social history and family history was reviewed and unchanged from prior visit.    Review of Systems:    Review of Systems   A comprehensive 14 point review of systems was performed and was negative except as mentioned.      Medications:  The current medication list was reviewed in the EMR    ALLERGIES:    Allergies   Allergen Reactions   • Ampicillin Anaphylaxis     Tolerated cefdinir   • Erythromycin Anaphylaxis   • Morphine And Related Anaphylaxis   • Oxycodone-Acetaminophen Anaphylaxis   • Oxycodone-Aspirin Anaphylaxis   • Penicillins Anaphylaxis     Tolerated cefdinir   • Sulfa Antibiotics Swelling and Rash   • Fentanyl Hives     Hives, itching   • Aspirin GI Bleeding   • Codeine Swelling   • Contrast Dye Swelling   • Ibuprofen GI Bleeding   • Latex Arrhythmia   • Propoxyphene Nausea And Vomiting   • Saccharin Nausea And Vomiting   • Tramadol Swelling         Physical Exam    VITAL SIGNS:  /75 (BP Location: Right arm, Patient Position: Sitting)   Pulse 74   Temp 98 °F (36.7 °C) (Oral)   Resp 17   Ht 160 cm (62.99\")   Wt 82.1 kg (181 lb)   SpO2 (!) 89%   BMI 32.07 kg/m²   Temp:  [98 °F (36.7 °C)-98.6 °F (37 °C)] 98 °F (36.7 °C)      Performance Status: 1    Physical Exam    General: well appearing, very emotional  HEENT: sclera anicteric, oropharynx clear, neck is supple  Lymphatics: no cervical, supraclavicular, or axillary adenopathy  Cardiovascular: regular rate and rhythm, no murmurs, rubs or gallops  Lungs: clear to auscultation bilaterally  Abdomen: soft, nontender, nondistended.  No palpable organomegaly  Extremities: no lower extremity edema  Skin: no rashes, lesions, bruising, or petechiae  Msk:  Shows no weakness of the large muscle groups  Psych: Mood is fairly down        RECENT LABS:    Lab Results   Component Value Date    HGB " 12.3 07/20/2018    HCT 37.8 07/20/2018    MCV 92.9 07/20/2018     07/20/2018    WBC 5.82 07/20/2018    NEUTROABS 4.87 07/11/2018    LYMPHSABS 1.63 07/11/2018    MONOSABS 0.72 07/11/2018    EOSABS 0.09 07/11/2018    BASOSABS 0.03 07/11/2018       Lab Results   Component Value Date    GLUCOSE 106 (H) 07/20/2018    BUN 11 07/20/2018    CREATININE 0.62 07/20/2018     07/20/2018    K 4.0 07/20/2018     07/20/2018    CO2 25.0 07/20/2018    CALCIUM 8.5 (L) 07/20/2018    PROTEINTOT 7.8 07/11/2018    ALBUMIN 4.94 (H) 07/11/2018    BILITOT 0.8 07/11/2018    ALKPHOS 138 (H) 07/11/2018    AST 24 07/11/2018    ALT 28 07/11/2018         Assessment/Plan    1.  Metastatic breast cancer: Patient currently on letrozole.  She will start Ibrance at home.    2.  Pain: Patient currently on ketamine drip.  Pain seems better control.  We defined a nice transition from home.  Palliative following for this.    3.  Severe depression: There is data to suggest ketamine drip can help improve this.  Hopefully she starts feeling better in the next several days.      Patient can be discharged home once pain is controlled.        Monica Weiss MD  [facility]ematology [facility] On[facility]    7/21/2018     Please note that portions of this note may have been completed with a voice recognition program. Efforts were made to edit the dictations, but occasionally words are mistranscribed.

## 2018-07-21 NOTE — PROGRESS NOTES
[facility] Medicine Services  PROGRESS NOTE    Patient Name: Betsy Chi  : 1955  MRN: [id number]    Date of Admission: 2018  Length of Stay: 10  Primary Care Physician: PRAFUL Wheatley    Subjective   Subjective     CC: f/u n/v and UTI    HPI:  Didn't feel well this morning.  [place] like ketamine gtt was too high, + double vision.  Currently turned off and she is feeling better.  No other complaints.    Review of Systems  Gen- No fevers, chills  CV- No chest pain, palpitations  Resp- No cough, dyspnea  GI-  No V/D, abd pain, +nausea intermittently    Otherwise ROS is negative except as mentioned in the HPI.    Objective   Objective     Vital Signs:   Temp:  [98.1 °F (36.7 °C)-98.6 °F (37 °C)] 98.5 °F (36.9 °C)  Heart Rate:  [69-90] 78  Resp:  [18] 18  BP: (109-129)/(61-67) 118/66        Physical Exam:  Constitutional: No acute distress, awake, alert, chronically ill appearing  HENT: NCAT, mucous membranes moist  Respiratory: Clear to auscultation bilaterally, respiratory effort normal   Cardiovascular: RRR, no murmurs, rubs, or gallops,  Gastrointestinal: Positive bowel sounds, soft, nontender, nondistended  Musculoskeletal: No bilateral ankle edema  Psychiatric: Appropriate affect, cooperative  Neurologic: Oriented x 3, no focal deficits  Skin: + psoriasis patches      Results Reviewed:  I have personally reviewed current lab, radiology, and data and agree.      Results from last 7 days  Lab Units 18  0650   WBC 10*3/mm3 5.82   HEMOGLOBIN g/dL 12.3   HEMATOCRIT % 37.8   PLATELETS 10*3/mm3 208       Results from last 7 days  Lab Units 18  0650   SODIUM mmol/L 139   POTASSIUM mmol/L 4.0   CHLORIDE mmol/L 108   CO2 mmol/L 25.0   BUN mg/dL 11   CREATININE mg/dL 0.62   GLUCOSE mg/dL 106*   CALCIUM mg/dL 8.5*     Estimated Creatinine Clearance: 93.1 mL/min (by C-G formula based on SCr of 0.62 mg/dL).  No results found for: BNP    Microbiology Results Abnormal      Procedure Component Value - Date/Time    Urine Culture - Urine, [[id number]]  (Abnormal)  (Susceptibility) Collected:  07/13/18 1636    Lab Status:  Final result Specimen:  Urine from Urine, Clean Catch Updated:  07/15/18 1032     Urine Culture >100,000 CFU/mL Escherichia coli (A)    Susceptibility      Escherichia coli     CHAMP     Ampicillin <=8 ug/ml Susceptible     Ampicillin + Sulbactam <=8/4 ug/ml Susceptible     Aztreonam <=8 ug/ml Susceptible     Cefepime <=8 ug/ml Susceptible     Cefotaxime <=2 ug/ml Susceptible     Ceftriaxone <=8 ug/ml Susceptible     Cefuroxime sodium <=4 ug/ml Susceptible     Cephalothin 16 ug/ml Intermediate     Ertapenem <=1 ug/ml Susceptible     Gentamicin <=4 ug/ml Susceptible     Levofloxacin <=2 ug/ml Susceptible     Meropenem <=1 ug/ml Susceptible     Nitrofurantoin <=32 ug/ml Susceptible     Piperacillin + Tazobactam <=16 ug/ml Susceptible     Tetracycline <=4 ug/ml Susceptible     Tobramycin <=4 ug/ml Susceptible     Trimethoprim + Sulfamethoxazole <=2/38 ug/ml Susceptible                        Results for orders placed during the hospital encounter of 07/11/18   Adult Transthoracic Echo Complete W/ Cont if Necessary Per Protocol    Narrative · Left ventricular systolic function is normal. Estimated EF = 65%.  · Left ventricular wall thickness is consistent with mild concentric   hypertrophy.  · Left ventricular diastolic dysfunction (grade II) consistent with   pseudonormalization.  · Estimated right ventricular systolic pressure from tricuspid   regurgitation is normal (<35 mmHg).          I have reviewed the medications.    Assessment/Plan   Assessment / Plan     Hospital Problem List     * (Principal)Acute on chronic hypoxemic respiratory failure not requiring ventilatory support    Mixed anxiety depressive disorder    Atrial fibrillation (CMS/[facility])    COPD (chronic obstructive pulmonary disease) (CMS/[facility])    Type 2 diabetes mellitus (CMS/[facility])    Dyslipidemia    Hypertension  "   Pulmonary nodule right lower lobe medially (2 x 1.5 cm).    Overview Signed 5/22/2017  1:30 PM by Janine Mckeon PA-C     Description: A.  CT scan of the chest February 11 thousand 16 reports a noncalcified 8.5-9 mm nodule in the medial portion of the right lower lobe.         Rheumatoid arthritis (CMS/HCC)    Invasive ductal carcinoma of breast, left (CMS/HCC)    Chronic hypoxemic respiratory failure (CMS/HCC)    Overview Signed 7/12/2018  2:01 PM by Blade Taylor MD     2Lnc chronically         Dyspnea    Atypical chest pain    Acute on chronic diastolic CHF (congestive heart failure) (CMS/HCC)    Constipation         Brief Hospital Course to date:  Betsy Chi is a 62 y.o. female w/ metastatic breast cancer, copd, chronic 2Lnc use, ? Hx afib, dm2 ra presented as direct admisison from dr. centeno office for multiple complaints including diffuse pain, dyspnea.      Assessment & Plan:  - Abdominal pain and vomiting, etiology unknown, continue supportive therapy as below. IVF, antiemetics  ---slowly improving, now with intermittent nausea, no vomiting, but poor appetite     - BRET, suspect this is pre-renal, sec to n/v, Cr baseline is 0.7-0.8.  Resolved after IVF.      - Ecoli UTI, s/p merrem, now on cefdinir - end date in place      - Metastatic breast CA , Dr Centeno following, continue letrozole, s/p zometa 7/12, plan to start ibrance once she returns home  -- palliative following for symptoms.  On ketamine gtt with plans to transition to PO at discharge.  Currently off, will defer to palliative on resuming/dose.    - Acute respiratory failure, suspect volume overload, echo normal ef, diastolic dysfunction, ct angio no pulm embolism, continue prn diuresis, O2 supp, hold on steroids  Improved     - Back pain with diffuse \"bone pain\", suspect this is sec to bony mets, palliative consulted and assisting. Palliative started on Ketamine infusion  for pain control.   Pt report a little improvement noted. "     - Cards & heme-onc & palliative following (no ischemic evaluation, treat medically), suspected Afib per cards notes (data deficient)  - Continue bowel regimen (miralax bid, senokot bid, prn dulcolax) as constipation likely causing some discomfort  - steroid cream to rash low back     Dispo - home early to mid week once symptoms better controlled.    DVT Prophylaxis: [facility]     CODE STATUS:   Code Status and Medical Interventions:   Ordered at: 07/20/18 1308     Limited Support to NOT Include:    Vasopressors    Intubation    Cardioversion/Defibrillation     Code Status:    No CPR     Medical Interventions (Level of Support Prior to Arrest):    Limited       Disposition: anticipate d/c home once pain is reasonably controlled, ??TBD    Electronically signed by Shahriar Kellogg MD, 07/21/18, 3:48 PM.

## 2018-07-22 PROCEDURE — 63710000001 DRONABINOL PER 2.5 MG: Performed by: FAMILY MEDICINE

## 2018-07-22 PROCEDURE — 25010000002 ONDANSETRON PER 1 MG: Performed by: FAMILY MEDICINE

## 2018-07-22 PROCEDURE — 97110 THERAPEUTIC EXERCISES: CPT

## 2018-07-22 PROCEDURE — 25010000002 HEPARIN (PORCINE) PER 1000 UNITS: Performed by: FAMILY MEDICINE

## 2018-07-22 PROCEDURE — 63710000001 DIPHENHYDRAMINE PER 50 MG: Performed by: INTERNAL MEDICINE

## 2018-07-22 PROCEDURE — 97116 GAIT TRAINING THERAPY: CPT

## 2018-07-22 PROCEDURE — 99232 SBSQ HOSP IP/OBS MODERATE 35: CPT | Performed by: NURSE PRACTITIONER

## 2018-07-22 RX ORDER — KETAMINE HCL IN 0.9 % NACL 50 MG/5 ML
10 SYRINGE (ML) INTRAVENOUS EVERY 8 HOURS SCHEDULED
Status: DISCONTINUED | OUTPATIENT
Start: 2018-07-22 | End: 2018-07-24 | Stop reason: HOSPADM

## 2018-07-22 RX ORDER — KETAMINE HCL IN 0.9 % NACL 50 MG/5 ML
10 SYRINGE (ML) INTRAVENOUS EVERY 8 HOURS SCHEDULED
Status: DISCONTINUED | OUTPATIENT
Start: 2018-07-22 | End: 2018-07-22 | Stop reason: SDUPTHER

## 2018-07-22 RX ORDER — DIPHENHYDRAMINE HCL 50 MG
50 CAPSULE ORAL ONCE
Status: COMPLETED | OUTPATIENT
Start: 2018-07-22 | End: 2018-07-22

## 2018-07-22 RX ADMIN — CARVEDILOL 3.12 MG: 3.12 TABLET, FILM COATED ORAL at 18:58

## 2018-07-22 RX ADMIN — Medication 10 MG: at 22:14

## 2018-07-22 RX ADMIN — DRONABINOL 5 MG: 2.5 CAPSULE ORAL at 22:23

## 2018-07-22 RX ADMIN — HEPARIN SODIUM 5000 UNITS: 5000 INJECTION, SOLUTION INTRAVENOUS; SUBCUTANEOUS at 22:24

## 2018-07-22 RX ADMIN — LORAZEPAM 0.5 MG: 0.5 TABLET ORAL at 22:23

## 2018-07-22 RX ADMIN — LETROZOLE 2.5 MG: 2.5 TABLET, FILM COATED ORAL at 09:04

## 2018-07-22 RX ADMIN — TRIAMCINOLONE ACETONIDE: 1 CREAM TOPICAL at 09:04

## 2018-07-22 RX ADMIN — CARVEDILOL 3.12 MG: 3.12 TABLET, FILM COATED ORAL at 09:04

## 2018-07-22 RX ADMIN — LORAZEPAM 0.5 MG: 0.5 TABLET ORAL at 09:27

## 2018-07-22 RX ADMIN — DRONABINOL 5 MG: 2.5 CAPSULE ORAL at 09:04

## 2018-07-22 RX ADMIN — LORAZEPAM 0.5 MG: 0.5 TABLET ORAL at 16:05

## 2018-07-22 RX ADMIN — DIPHENHYDRAMINE HYDROCHLORIDE 50 MG: 50 CAPSULE ORAL at 18:58

## 2018-07-22 RX ADMIN — TRIAMCINOLONE ACETONIDE: 1 CREAM TOPICAL at 22:24

## 2018-07-22 RX ADMIN — PANTOPRAZOLE SODIUM 40 MG: 40 TABLET, DELAYED RELEASE ORAL at 05:52

## 2018-07-22 RX ADMIN — LORAZEPAM 0.5 MG: 0.5 TABLET ORAL at 05:52

## 2018-07-22 RX ADMIN — ATORVASTATIN CALCIUM 20 MG: 20 TABLET, FILM COATED ORAL at 22:24

## 2018-07-22 RX ADMIN — ONDANSETRON 4 MG: 2 INJECTION INTRAMUSCULAR; INTRAVENOUS at 22:30

## 2018-07-22 RX ADMIN — HEPARIN SODIUM 5000 UNITS: 5000 INJECTION, SOLUTION INTRAVENOUS; SUBCUTANEOUS at 05:52

## 2018-07-22 RX ADMIN — HEPARIN SODIUM 5000 UNITS: 5000 INJECTION, SOLUTION INTRAVENOUS; SUBCUTANEOUS at 16:05

## 2018-07-22 NOTE — PLAN OF CARE
Problem: Patient Care Overview  Goal: Plan of Care Review  Outcome: Ongoing (interventions implemented as appropriate)   07/22/18 0356   Coping/Psychosocial   Plan of Care Reviewed With patient   Plan of Care Review   Progress improving   OTHER   Outcome Summary patient off IV ketamine throughout the night. denies pain at this time. vital signs stable. no acute events this shift.        Problem: Skin Injury Risk (Adult)  Goal: Skin Health and Integrity  Outcome: Ongoing (interventions implemented as appropriate)      Problem: Fall Risk (Adult)  Goal: Identify Related Risk Factors and Signs and Symptoms  Outcome: Ongoing (interventions implemented as appropriate)    Goal: Absence of Fall  Outcome: Ongoing (interventions implemented as appropriate)      Problem: Palliative Care (Adult)  Goal: Identify Related Risk Factors and Signs and Symptoms  Outcome: Ongoing (interventions implemented as appropriate)

## 2018-07-22 NOTE — PROGRESS NOTES
"    [facility] Medicine Services  PROGRESS NOTE    Patient Name: Betsy Chi  : 1955  MRN: [id number]    Date of Admission: 2018  Length of Stay: 11  Primary Care Physician: PRAFUL Wheatley    Subjective   Subjective     CC: f/u n/v and UTI    HPI:  Is seen resting upright in bed in no acute distress.  Labile emotions, crying frequently.  Ketamine drip currently off.  Patient states she feels a little better today.  Vision is back to normal.  Still having intermittent nausea but no vomiting.  Tolerating small amounts of food with poor appetite.  Having loose stools which she relates to stool softeners, which she is now holding.  Hopes to discharge home in the next couple of days if oral pain medication works.  No new issues.      Review of Systems  Gen- No fevers, chills  CV- No chest pain, palpitations  Resp- No cough, dyspnea  GI-  No V/D, abd pain, +nausea intermittently    Otherwise ROS is negative except as mentioned in the HPI.    Objective   Objective     Vital Signs:   Temp:  [98 °F (36.7 °C)-98.5 °F (36.9 °C)] 98.1 °F (36.7 °C)  Heart Rate:  [68-86] 68  Resp:  [17-18] 18  BP: (113-138)/(63-91) 113/83        Physical Exam:  Constitutional: No acute distress, awake, alert, chronically ill appearing.  Upright in bed.  No visitors at bedside.  HENT: NCAT, mucous membranes moist  Respiratory: Clear to auscultation bilaterally A/P, respiratory effort normal   Cardiovascular: RRR, no murmurs, rubs, or gallops,  Gastrointestinal: Positive bowel sounds, soft, nontender, nondistended.  Obese abd   Musculoskeletal: No bilateral ankle edema.  ESPARZA spont  Psychiatric: Appropriate affect, cooperative.  Labile emotions.  Crying frequently and states she \"can't control it\".   Neurologic: Oriented x 3, no focal deficits.  Speech clear and appropriate. Follows commands  Skin: + psoriasis patches noted to palms of hands.       Results Reviewed:  I have personally reviewed current lab, " radiology, and data and agree.      Results from last 7 days  Lab Units 07/20/18  0650   WBC 10*3/mm3 5.82   HEMOGLOBIN g/dL 12.3   HEMATOCRIT % 37.8   PLATELETS 10*3/mm3 208       Results from last 7 days  Lab Units 07/20/18  0650   SODIUM mmol/L 139   POTASSIUM mmol/L 4.0   CHLORIDE mmol/L 108   CO2 mmol/L 25.0   BUN mg/dL 11   CREATININE mg/dL 0.62   GLUCOSE mg/dL 106*   CALCIUM mg/dL 8.5*     Estimated Creatinine Clearance: 93.1 mL/min (by C-G formula based on SCr of 0.62 mg/dL).  No results found for: BNP    Microbiology Results Abnormal     Procedure Component Value - Date/Time    Urine Culture - Urine, [[id number]]  (Abnormal)  (Susceptibility) Collected:  07/13/18 1636    Lab Status:  Final result Specimen:  Urine from Urine, Clean Catch Updated:  07/15/18 1032     Urine Culture >100,000 CFU/mL Escherichia coli (A)    Susceptibility      Escherichia coli     CHAMP     Ampicillin <=8 ug/ml Susceptible     Ampicillin + Sulbactam <=8/4 ug/ml Susceptible     Aztreonam <=8 ug/ml Susceptible     Cefepime <=8 ug/ml Susceptible     Cefotaxime <=2 ug/ml Susceptible     Ceftriaxone <=8 ug/ml Susceptible     Cefuroxime sodium <=4 ug/ml Susceptible     Cephalothin 16 ug/ml Intermediate     Ertapenem <=1 ug/ml Susceptible     Gentamicin <=4 ug/ml Susceptible     Levofloxacin <=2 ug/ml Susceptible     Meropenem <=1 ug/ml Susceptible     Nitrofurantoin <=32 ug/ml Susceptible     Piperacillin + Tazobactam <=16 ug/ml Susceptible     Tetracycline <=4 ug/ml Susceptible     Tobramycin <=4 ug/ml Susceptible     Trimethoprim + Sulfamethoxazole <=2/38 ug/ml Susceptible                        Results for orders placed during the hospital encounter of 07/11/18   Adult Transthoracic Echo Complete W/ Cont if Necessary Per Protocol    Narrative · Left ventricular systolic function is normal. Estimated EF = 65%.  · Left ventricular wall thickness is consistent with mild concentric   hypertrophy.  · Left ventricular diastolic dysfunction  (grade II) consistent with   pseudonormalization.  · Estimated right ventricular systolic pressure from tricuspid   regurgitation is normal (<35 mmHg).          I have reviewed the medications.    Assessment/Plan   Assessment / Plan     Hospital Problem List     * (Principal)Acute on chronic hypoxemic respiratory failure not requiring ventilatory support    Mixed anxiety depressive disorder    Atrial fibrillation (CMS/HCC)    COPD (chronic obstructive pulmonary disease) (CMS/HCC)    Type 2 diabetes mellitus (CMS/HCC)    Dyslipidemia    Hypertension    Pulmonary nodule right lower lobe medially (2 x 1.5 cm).    Overview Signed 5/22/2017  1:30 PM by Janine Mckeon PA-C     Description: A.  CT scan of the chest February 11 thousand 16 reports a noncalcified 8.5-9 mm nodule in the medial portion of the right lower lobe.         Rheumatoid arthritis (CMS/HCC)    Invasive ductal carcinoma of breast, left (CMS/HCC)    Chronic hypoxemic respiratory failure (CMS/HCC)    Overview Signed 7/12/2018  2:01 PM by Blade Taylor MD     2Lnc chronically         Dyspnea    Atypical chest pain    Acute on chronic diastolic CHF (congestive heart failure) (CMS/HCC)    Constipation         Brief Hospital Course to date:  Betsy Chi is a 62 y.o. female w/ metastatic breast cancer, copd, chronic 2Lnc use, ? Hx afib, dm2 ra presented as direct admisison from dr. centeno office for multiple complaints including diffuse pain, dyspnea.      Assessment & Plan:  - Abdominal pain and vomiting, etiology unknown, continue supportive therapy as below. IVF, antiemetics  ---slowly improving, still with intermittent nausea, no vomiting, but poor appetite     - BRET, suspect this was pre-renal, sec to n/v, Cr baseline is 0.7-0.8.    Resolved after IVF.      - Ecoli UTI, s/p merrem, now on cefdinir - end date in place    - Metastatic breast CA , Dr Centeno following, continue letrozole, s/p zometa 7/12, plan to start ibrance once she returns  "home  -- palliative following for symptoms.  Ketamine gtt dcd yesterday due to vision issues reported.  Poss plans to transition to PO ketamine at discharge if can tolerate, pending palliative recs      - Acute respiratory failure, suspect volume overload, echo normal ef, diastolic dysfunction, ct angio no pulm embolism, continue prn diuresis, O2 supp, hold on steroids  Improved     - Back pain with diffuse \"bone pain\", suspect this is sec to bony mets, palliative consulted and assisting. Palliative managing pain control.   Pt report a little improvement noted.     - Cards & heme-onc & palliative following (no ischemic evaluation, treat medically), suspected Afib per cards notes (data deficient)  - Continue bowel regimen (miralax bid, senokot bid, prn dulcolax) as constipation likely causing some discomfort  - steroid cream to rash low back  --ok to dc home per oncology per Dr Dailey note 7/21 when medically ready     Dispo - home early to mid week once symptoms better controlled.    DVT Prophylaxis: [facility]     CODE STATUS:   Code Status and Medical Interventions:   Ordered at: 07/20/18 1308     Limited Support to NOT Include:    Vasopressors    Intubation    Cardioversion/Defibrillation     Code Status:    No CPR     Medical Interventions (Level of Support Prior to Arrest):    Limited       Disposition: anticipate d/c home once pain is reasonably controlled, ??TBD    Electronically signed by PRAFUL Murdock, 07/22/18, 12:54 PM.    "

## 2018-07-22 NOTE — PROGRESS NOTES
"Palliative Care Progress Note    Date of Admission: 7/11/2018    Subjective:    Had a fine night w/o ketamine infusion.  Pain has increased today though and pt rates it from 5-7 range.  No vision complaint.  No nausea, has eaten well.  Still emotional, crying off and on.  Says she could use some Beandry due to some allergy symptoms today of itchy nose with sneezing and rhinorrhea.      Current Code Status     Date Active Code Status Order ID Comments User Context       7/11/2018 12:54 PM CPR [id number]  Ania De Leon, DO Inpatient       Questions for Current Code Status     Question Answer Comment    Code Status CPR     Medical Interventions (Level of Support Prior to Arrest) Full     Level Of Support Discussed With Patient         Meds reviewed: ativan Q8, marinol BID  PRNs given ativan and neb tx.    Objective: /77 (BP Location: Right arm, Patient Position: Lying)   Pulse 68   Temp 98.2 °F (36.8 °C) (Oral)   Resp 18   Ht 160 cm (62.99\")   Wt 82.1 kg (181 lb)   SpO2 96%   BMI 32.07 kg/m²    No intake or output data in the 24 hours ending 07/22/18 1836  Physical Exam:  Gen: Chronically ill appearing female, in bed, tearful  Head/neck: NC/AT, trachea midline  EENT: EOMI, patent nares, mucous membranes moist  Resp: Clear to auscultation bilaterally, respiratory effort normal   CV: RRR, no murmur  Abd: Soft, nontender, nondistended  Ext: No bilateral ankle edema  Skin: warm, dry, psoriasis noted on hands  Neuro: Awake, alert, no focal deficit, no myoclonus  Psychiatric: Crying with talk about cancer status, goals      Results from last 7 days  Lab Units 07/20/18  0650   WBC 10*3/mm3 5.82   HEMOGLOBIN g/dL 12.3   HEMATOCRIT % 37.8   PLATELETS 10*3/mm3 208       Results from last 7 days  Lab Units 07/20/18  0650   SODIUM mmol/L 139   POTASSIUM mmol/L 4.0   CHLORIDE mmol/L 108   CO2 mmol/L 25.0   BUN mg/dL 11   CREATININE mg/dL 0.62   CALCIUM mg/dL 8.5*   GLUCOSE mg/dL 106*       Impression:   61yo F with " "Breast cancer metastatic to bone    Symptoms:  Back pain and diffuse Bone pain  Nausea  Double vision, resolved  Dysphagia  Dyspnea  Mood lability  Multiple medication intolerances and Opiate anaphylaxis    Plan:    -Long talk educating pt about disease status.  Discussed results of her last PET scan and CT chest.  -Spoke extensively about limited options for pain control given pt's adverse reactions and allergies to multiple meds.  Validated her fears about possible side efffects of ketamine.  After further discussion, pt is willing to try oral ketamine.  10mg PO TID ordered to start this evening.  -Pt concerned about DNR Code status.  After review of disease status and prognosis, pt agrees she does indeed wish to be DNR.  -As noted above, most of visit spent providing pt support and education.  Her goal is to be active for as long as she can.  She states \"there are still some things I want to do.\"  F/U planned with Oncology for further tx.  Will need Palliative Care clinic f/u though Hospice was consulted by Dr. Ojeda to provide pt with further info about that option.    (45 min F2F time)       Mesha Arguello MD  07/22/18  6:36 PM      "

## 2018-07-23 PROCEDURE — 63710000001 DRONABINOL PER 2.5 MG: Performed by: FAMILY MEDICINE

## 2018-07-23 PROCEDURE — 25010000002 HEPARIN (PORCINE) PER 1000 UNITS: Performed by: FAMILY MEDICINE

## 2018-07-23 PROCEDURE — 99232 SBSQ HOSP IP/OBS MODERATE 35: CPT | Performed by: NURSE PRACTITIONER

## 2018-07-23 PROCEDURE — 25010000002 ONDANSETRON PER 1 MG: Performed by: FAMILY MEDICINE

## 2018-07-23 RX ADMIN — HEPARIN SODIUM 5000 UNITS: 5000 INJECTION, SOLUTION INTRAVENOUS; SUBCUTANEOUS at 21:34

## 2018-07-23 RX ADMIN — TRIAMCINOLONE ACETONIDE: 1 CREAM TOPICAL at 09:44

## 2018-07-23 RX ADMIN — PANTOPRAZOLE SODIUM 40 MG: 40 TABLET, DELAYED RELEASE ORAL at 05:30

## 2018-07-23 RX ADMIN — Medication 10 MG: at 14:20

## 2018-07-23 RX ADMIN — CARVEDILOL 3.12 MG: 3.12 TABLET, FILM COATED ORAL at 17:31

## 2018-07-23 RX ADMIN — ONDANSETRON 4 MG: 2 INJECTION INTRAMUSCULAR; INTRAVENOUS at 09:49

## 2018-07-23 RX ADMIN — Medication 10 MG: at 21:34

## 2018-07-23 RX ADMIN — LORAZEPAM 0.5 MG: 0.5 TABLET ORAL at 14:21

## 2018-07-23 RX ADMIN — DRONABINOL 5 MG: 2.5 CAPSULE ORAL at 21:33

## 2018-07-23 RX ADMIN — HEPARIN SODIUM 5000 UNITS: 5000 INJECTION, SOLUTION INTRAVENOUS; SUBCUTANEOUS at 05:30

## 2018-07-23 RX ADMIN — DRONABINOL 5 MG: 2.5 CAPSULE ORAL at 09:43

## 2018-07-23 RX ADMIN — HEPARIN SODIUM 5000 UNITS: 5000 INJECTION, SOLUTION INTRAVENOUS; SUBCUTANEOUS at 14:21

## 2018-07-23 RX ADMIN — LORAZEPAM 0.5 MG: 0.5 TABLET ORAL at 10:28

## 2018-07-23 RX ADMIN — ATORVASTATIN CALCIUM 20 MG: 20 TABLET, FILM COATED ORAL at 21:33

## 2018-07-23 RX ADMIN — LORAZEPAM 0.5 MG: 0.5 TABLET ORAL at 21:34

## 2018-07-23 RX ADMIN — LORAZEPAM 0.5 MG: 0.5 TABLET ORAL at 05:30

## 2018-07-23 RX ADMIN — LETROZOLE 2.5 MG: 2.5 TABLET, FILM COATED ORAL at 09:43

## 2018-07-23 RX ADMIN — CARVEDILOL 3.12 MG: 3.12 TABLET, FILM COATED ORAL at 09:43

## 2018-07-23 RX ADMIN — ONDANSETRON 4 MG: 2 INJECTION INTRAMUSCULAR; INTRAVENOUS at 19:36

## 2018-07-23 RX ADMIN — Medication 10 MG: at 05:31

## 2018-07-23 NOTE — PAYOR COMM NOTE
"Kera Chi (62 y.o. Female)   Auth# [id number]  Nguyen Singleton RN, BSN  Phone # [phone]  Fax # [phone]    Date of Birth Social Security Number Address Home Phone MRN    1955  [address] [facility] [phone] [id number]    [employer] Marital Status          Denominational Legally        Admission Date Admission Type Admitting Provider Attending Provider Department, Room/Bed    7/11/18 Elective Shahriar Kellogg MD Dossett, Lee M, MD [facility] 6B, N625/1    Discharge Date Discharge Disposition Discharge Destination                       Attending Provider:  Shahriar Kellogg MD    Allergies:  Ampicillin, Erythromycin, Morphine And Related, Oxycodone-acetaminophen, Oxycodone-aspirin, Penicillins, Sulfa Antibiotics, Fentanyl, Aspirin, Codeine, Contrast Dye, Ibuprofen, Latex, Propoxyphene, Saccharin, Tramadol    Isolation:  None   Infection:  None   Code Status:  No CPR    Ht:  160 cm (62.99\")   Wt:  82.1 kg (181 lb)    Admission Cmt:  None   Principal Problem:  Acute on chronic hypoxemic respiratory failure not requiring ventilatory support [J96.91]                 Active Insurance as of 7/11/2018     Primary Coverage     Payor Plan Insurance Group Employer/Plan Group    PASSPORT PASSPORT MEDICAID     Payor Plan Address Payor Plan Phone Number Effective From Effective To    [address] [phone] 11/1/1997     [facility] [address]       Subscriber Name Subscriber Birth Date Member ID       KERA CHI 1955 [id number]                 Emergency Contacts      (Rel.) Home Phone Work Phone Mobile Phone    Fely Stringer (Relative) [phone] -- --    Camacho Stringer (Son) -- -- [phone]    Niya Stringer (Grandchild) [phone] -- --            Operative/Procedure Notes (last 72 hours) (Notes from 7/20/2018  2:42 PM through 7/23/2018  2:42 PM)     No notes of this type exist for this encounter.           Physician Progress Notes " (last 72 hours) (Notes from 7/20/2018  2:42 PM through 7/23/2018  2:42 PM)      Nadir Ojeda DO at 7/23/2018  1:48 PM          Palliative Care Progress Note    Date of Admission: 7/11/2018    Subjective:  Patient feels that her pain is somewhat better controlled today.  States that she has been up walking around quite a bit over the weekend.  Current Code Status     Date Active Code Status Order ID Comments User Context       7/11/2018 12:54 PM CPR [id number]  Ania De Leon DO Inpatient       Questions for Current Code Status     Question Answer Comment    Code Status CPR     Medical Interventions (Level of Support Prior to Arrest) Full     Level Of Support Discussed With Patient         No current facility-administered medications on file prior to encounter.      Current Outpatient Prescriptions on File Prior to Encounter   Medication Sig Dispense Refill   • albuterol (PROVENTIL HFA;VENTOLIN HFA) 108 (90 Base) MCG/ACT inhaler Inhale 2 puffs Every 4 (Four) Hours As Needed for Wheezing. 1 inhaler 2   • aluminum acetate (DOMEBORO) pack packet Apply  topically 3 (Three) Times a Day As Needed (psoriatic arthritis). 100 each 6   • amLODIPine (NORVASC) 10 MG tablet TAKE ONE TABLET BY MOUTH DAILY 30 tablet 0   • atorvastatin (LIPITOR) 20 MG tablet Take 1 tablet by mouth Every Night. 90 tablet 0   • carvedilol (COREG) 12.5 MG tablet TAKE ONE TABLET BY MOUTH TWICE A DAY WITH MEALS 60 tablet 0   • Cranberry 600 MG tablet Take 1 tablet by mouth Daily.     • glucose blood test strip Use as instructed 100 each 12   • glucose monitor monitoring kit 1 each 3 (Three) Times a Day As Needed (hypo/hyperglycemia). 1 each 3   • Lancets (ACCU-CHEK SOFT TOUCH) lancets Three times daily and as needed 100 each 12   • letrozole (FEMARA) 2.5 MG tablet Take 1 tablet by mouth Daily. 30 tablet 11   • ondansetron (ZOFRAN) 8 MG tablet Take 1 tablet by mouth 3 (Three) Times a Day As Needed for Nausea or Vomiting. 30 tablet 5   • pantoprazole  "(PROTONIX) 40 MG EC tablet Take 1 tablet by mouth Daily. 90 tablet 2       ketamine (KETALAR) infusion 7 mcg/kg/min Last Rate: Stopped (07/21/18 1220)     •  acetaminophen  •  aluminum acetate  •  bisacodyl  •  diphenhydrAMINE **OR** diphenhydrAMINE  •  ipratropium-albuterol  •  LORazepam  •  ondansetron  •  polyethylene glycol  •  sodium chloride    Objective: /69   Pulse 72   Temp 98.5 °F (36.9 °C) (Oral)   Resp 18   Ht 160 cm (62.99\")   Wt 82.1 kg (181 lb)   SpO2 96%   BMI 32.07 kg/m²       Intake/Output Summary (Last 24 hours) at 07/23/18 1348  Last data filed at 07/23/18 1245   Gross per 24 hour   Intake              560 ml   Output                0 ml   Net              560 ml     Physical Exam:      General Appearance:    Alert, cooperative,    Head:    Normocephalic, without obvious abnormality, atraumatic   Eyes:            Lids and lashes normal, conjunctivae and sclerae normal, no   icterus, no pallor, corneas clear, PERRLA   Ears:    Ears appear intact with no abnormalities noted   Throat:   No oral lesions, no thrush, oral mucosa moist   Neck:   No adenopathy, supple, trachea midline, no thyromegaly, no     carotid bruit, no JVD   Back:     No kyphosis present, no scoliosis present, no skin lesions,       erythema or scars, no tenderness to percussion or                   palpation,   range of motion normal   Lungs:     Clear to auscultation,respirations regular, even and                   unlabored    Heart:    Regular rhythm and normal rate, normal S1 and S2, no            murmur, no gallop, no rub, no click   Breast Exam:    Deferred   Abdomen:     Normal bowel sounds, no masses, no organomegaly, soft        non-tender, non-distended, no guarding, no rebound                 tenderness   Genitalia:    Deferred   Extremities:   Swelling noted over base of left thumb.  No tenderness in snuff box noted.   Pulses:   Pulses palpable and equal bilaterally   Skin:   No bleeding, bruising or rash " "  Lymph nodes:   No palpable adenopathy   Neurologic:   Cranial nerves 2 - 12 grossly intact, sensation intact, DTR        present and equal bilaterally       Results from last 7 days  Lab Units 18  0650   WBC 10*3/mm3 5.82   HEMOGLOBIN g/dL 12.3   HEMATOCRIT % 37.8   PLATELETS 10*3/mm3 208       Results from last 7 days  Lab Units 18  0650   SODIUM mmol/L 139   POTASSIUM mmol/L 4.0   CHLORIDE mmol/L 108   CO2 mmol/L 25.0   BUN mg/dL 11   CREATININE mg/dL 0.62   CALCIUM mg/dL 8.5*   GLUCOSE mg/dL 106*       Impression: Breast cancer with bone metastasis  Dysphagia  Dyspnea  Goals of care  Plan:  Hospice case manager to talk to patient today.  I will continue to current dose of ketamine and with patient decides whether not she wants hospice we will look at what route is viable.        Nadir Ojeda DO  18  1:48 PM        Electronically signed by Nadir Ojeda DO at 2018  1:49 PM     PRAFUL Murdock at 2018  9:50 AM              [facility] Medicine Services  PROGRESS NOTE    Patient Name: Betsy Chi  : 1955  MRN: [id number]    Date of Admission: 2018  Length of Stay: 12  Primary Care Physician: PRAFUL Wheatley    Subjective   Subjective     CC: f/u n/v and UTI    HPI:  Patient is seen standing at side of bed making her bed today.  She appears to be more comfortable but when asked states she is still hurting pretty bad.  Rates her pain currently 5-6/10 scale.  She did start taking oral ketamine last night.  States she seems to be doing \"okay so far\" but is worried that she will have a reaction later today.  Tolerating small meals with intermittent nausea.  No vomiting.  No chest pain or shortness of air.  Still labile emotions crying intermittently.  No new issues.    Review of Systems  Gen- No fevers, chills  CV- No chest pain, palpitations  Resp- No cough, dyspnea  GI-  No V/D, abd pain, +nausea intermittently    Otherwise " "ROS is negative except as mentioned in the HPI.    Objective   Objective     Vital Signs:   Temp:  [98.2 °F (36.8 °C)-98.8 °F (37.1 °C)] 98.5 °F (36.9 °C)  Heart Rate:  [72-82] 72  Resp:  [18] 18  BP: (113-125)/(63-77) 120/69        Physical Exam:  Constitutional: No acute distress, awake, alert, chronically ill appearing.  Standing it side of bed making her bed currently.  No visitors at bedside.  HENT: NCAT, mucous membranes moist  Respiratory: Clear to auscultation bilaterally A/P, respiratory effort normal On RA  Cardiovascular: RRR, no murmurs, rubs, or gallops,  Gastrointestinal: Positive bowel sounds, soft, nontender, nondistended.  Obese abd   Musculoskeletal: No bilateral ankle edema.  ESPARZA spont  Psychiatric: Appropriate affect, cooperative.  Labile emotions.  Crying frequently States it's \"a little better today\".   Neurologic: Oriented x 3, no focal deficits.  Speech clear and appropriate. Follows commands  Skin: + psoriasis patches noted to palms of hands.       Results Reviewed:  I have personally reviewed current lab, radiology, and data and agree.      Results from last 7 days  Lab Units 07/20/18  0650   WBC 10*3/mm3 5.82   HEMOGLOBIN g/dL 12.3   HEMATOCRIT % 37.8   PLATELETS 10*3/mm3 208       Results from last 7 days  Lab Units 07/20/18  0650   SODIUM mmol/L 139   POTASSIUM mmol/L 4.0   CHLORIDE mmol/L 108   CO2 mmol/L 25.0   BUN mg/dL 11   CREATININE mg/dL 0.62   GLUCOSE mg/dL 106*   CALCIUM mg/dL 8.5*     Estimated Creatinine Clearance: 93.1 mL/min (by C-G formula based on SCr of 0.62 mg/dL).  No results found for: BNP    Microbiology Results Abnormal     Procedure Component Value - Date/Time    Urine Culture - Urine, [[id number]]  (Abnormal)  (Susceptibility) Collected:  07/13/18 1636    Lab Status:  Final result Specimen:  Urine from Urine, Clean Catch Updated:  07/15/18 1032     Urine Culture >100,000 CFU/mL Escherichia coli (A)    Susceptibility      Escherichia coli     CHAMP     Ampicillin <=8 " ug/ml Susceptible     Ampicillin + Sulbactam <=8/4 ug/ml Susceptible     Aztreonam <=8 ug/ml Susceptible     Cefepime <=8 ug/ml Susceptible     Cefotaxime <=2 ug/ml Susceptible     Ceftriaxone <=8 ug/ml Susceptible     Cefuroxime sodium <=4 ug/ml Susceptible     Cephalothin 16 ug/ml Intermediate     Ertapenem <=1 ug/ml Susceptible     Gentamicin <=4 ug/ml Susceptible     Levofloxacin <=2 ug/ml Susceptible     Meropenem <=1 ug/ml Susceptible     Nitrofurantoin <=32 ug/ml Susceptible     Piperacillin + Tazobactam <=16 ug/ml Susceptible     Tetracycline <=4 ug/ml Susceptible     Tobramycin <=4 ug/ml Susceptible     Trimethoprim + Sulfamethoxazole <=2/38 ug/ml Susceptible                        Results for orders placed during the hospital encounter of 07/11/18   Adult Transthoracic Echo Complete W/ Cont if Necessary Per Protocol    Narrative · Left ventricular systolic function is normal. Estimated EF = 65%.  · Left ventricular wall thickness is consistent with mild concentric   hypertrophy.  · Left ventricular diastolic dysfunction (grade II) consistent with   pseudonormalization.  · Estimated right ventricular systolic pressure from tricuspid   regurgitation is normal (<35 mmHg).          I have reviewed the medications.    Assessment/Plan   Assessment / Plan     Hospital Problem List     * (Principal)Acute on chronic hypoxemic respiratory failure not requiring ventilatory support    Mixed anxiety depressive disorder    Atrial fibrillation (CMS/HCC)    COPD (chronic obstructive pulmonary disease) (CMS/HCC)    Type 2 diabetes mellitus (CMS/HCC)    Dyslipidemia    Hypertension    Pulmonary nodule right lower lobe medially (2 x 1.5 cm).    Overview Signed 5/22/2017  1:30 PM by Janine Mckeon PA-C     Description: A.  CT scan of the chest February 11 thousand 16 reports a noncalcified 8.5-9 mm nodule in the medial portion of the right lower lobe.         Rheumatoid arthritis (CMS/HCC)    Invasive ductal carcinoma of  "breast, left (CMS/HCC)    Chronic hypoxemic respiratory failure (CMS/HCC)    Overview Signed 7/12/2018  2:01 PM by Blade Taylor MD     2Lnc chronically         Dyspnea    Atypical chest pain    Acute on chronic diastolic CHF (congestive heart failure) (CMS/HCC)    Constipation         Brief Hospital Course to date:  Betsy Chi is a 62 y.o. female w/ metastatic breast cancer, copd, chronic 2Lnc use, ? Hx afib, dm2 ra presented as direct admisison from dr. mendoza office for multiple complaints including diffuse pain, dyspnea.      Assessment & Plan:  - Abdominal pain and vomiting, etiology unknown, continue supportive therapy as below. IVF, antiemetics  ---slowly improving, still with intermittent nausea, no vomiting, but poor appetite     - BRET, suspect this was pre-renal, sec to n/v, Cr baseline is 0.7-0.8.    Resolved after IVF.      - Ecoli UTI, s/p merrem, now on cefdinir - end date in place    - Metastatic breast CA , Dr Mendoza following, continue letrozole, s/p zometa 7/12, plan to start ibrance once she returns home  -- palliative following for symptoms.  Ketamine gtt dcd 7/21 due to vision issues reported.  Palliative medicine visit patient again yesterday evening. She agreed to trial of oral ketamine due to her multiple medication allergies limiting her oral pain medication options.  States she is tolerating it \"okay for now\" but is very anxious that she will have an allergic reaction today or even tomorrow due to this being her history with medicines.  She wishes to stay in hospital until she knows that this medicine will work for her.  --Awaiting palliative further recommendations    - Acute respiratory failure, suspect volume overload, echo normal ef, diastolic dysfunction, ct angio no pulm embolism, continue prn diuresis, O2 supp, hold on steroids  Improved     - Back pain with diffuse \"bone pain\", suspect this is sec to bony mets, palliative consulted and assisting. Palliative managing pain " control.   Pt report a little improvement noted and appears slightly more comfortable today. Up moving around room some on my exam.  Trialing oral ketamine.    - Cards & heme-onc & palliative following (no ischemic evaluation, treat medically), suspected Afib per cards notes (data deficient)  - Continue bowel regimen (miralax bid, senokot bid, prn dulcolax) as constipation likely causing some discomfort  - steroid cream to rash low back  --ok to dc home per oncology per Dr Dailey note 7/21 when medically ready     Dispo - home early to mid week once symptoms better controlled pending palliative final recommendations for pain meds    DVT Prophylaxis: [facility]     CODE STATUS:   Code Status and Medical Interventions:   Ordered at: 07/20/18 1308     Limited Support to NOT Include:    Vasopressors    Intubation    Cardioversion/Defibrillation     Code Status:    No CPR     Medical Interventions (Level of Support Prior to Arrest):    Limited       Disposition: anticipate d/c home once pain is reasonably controlled, ??TBD    Electronically signed by PRAFUL Murdock, 07/23/18, 11:22 AM.      Electronically signed by PRAFUL Murdock at 7/23/2018 11:27 AM     Mesha Arguello MD at 7/22/2018  6:35 PM          Palliative Care Progress Note    Date of Admission: 7/11/2018    Subjective:    Had a fine night w/o ketamine infusion.  Pain has increased today though and pt rates it from 5-7 range.  No vision complaint.  No nausea, has eaten well.  Still emotional, crying off and on.  Says she could use some Beandry due to some allergy symptoms today of itchy nose with sneezing and rhinorrhea.      Current Code Status     Date Active Code Status Order ID Comments User Context       7/11/2018 12:54 PM CPR [id number]  Ania De Leon, DO Inpatient       Questions for Current Code Status     Question Answer Comment    Code Status CPR     Medical Interventions (Level of Support Prior to Arrest) Full     Level Of  "Support Discussed With Patient         Meds reviewed: ativan Q8, marinol BID  PRNs given ativan and neb tx.    Objective: /77 (BP Location: Right arm, Patient Position: Lying)   Pulse 68   Temp 98.2 °F (36.8 °C) (Oral)   Resp 18   Ht 160 cm (62.99\")   Wt 82.1 kg (181 lb)   SpO2 96%   BMI 32.07 kg/m²     No intake or output data in the 24 hours ending 07/22/18 1836  Physical Exam:  Gen: Chronically ill appearing female, in bed, tearful  Head/neck: NC/AT, trachea midline  EENT: EOMI, patent nares, mucous membranes moist  Resp: Clear to auscultation bilaterally, respiratory effort normal   CV: RRR, no murmur  Abd: Soft, nontender, nondistended  Ext: No bilateral ankle edema  Skin: warm, dry, psoriasis noted on hands  Neuro: Awake, alert, no focal deficit, no myoclonus  Psychiatric: Crying with talk about cancer status, goals      Results from last 7 days  Lab Units 07/20/18  0650   WBC 10*3/mm3 5.82   HEMOGLOBIN g/dL 12.3   HEMATOCRIT % 37.8   PLATELETS 10*3/mm3 208       Results from last 7 days  Lab Units 07/20/18  0650   SODIUM mmol/L 139   POTASSIUM mmol/L 4.0   CHLORIDE mmol/L 108   CO2 mmol/L 25.0   BUN mg/dL 11   CREATININE mg/dL 0.62   CALCIUM mg/dL 8.5*   GLUCOSE mg/dL 106*       Impression:   61yo F with Breast cancer metastatic to bone    Symptoms:  Back pain and diffuse Bone pain  Nausea  Double vision, resolved  Dysphagia  Dyspnea  Mood lability  Multiple medication intolerances and Opiate anaphylaxis    Plan:    -Long talk educating pt about disease status.  Discussed results of her last PET scan and CT chest.  -Spoke extensively about limited options for pain control given pt's adverse reactions and allergies to multiple meds.  Validated her fears about possible side efffects of ketamine.  After further discussion, pt is willing to try oral ketamine.  10mg PO TID ordered to start this evening.  -Pt concerned about DNR Code status.  After review of disease status and prognosis, pt agrees she " "does indeed wish to be DNR.  -As noted above, most of visit spent providing pt support and education.  Her goal is to be active for as long as she can.  She states \"there are still some things I want to do.\"  F/U planned with Oncology for further tx.  Will need Palliative Care clinic f/u though Hospice was consulted by Dr. Ojeda to provide pt with further info about that option.    (45 min F2F time)       Mesha Arguello MD  18  6:36 PM        Electronically signed by Mesha Arguello MD at 2018  7:53 AM     PRAFUL Murdock at 2018  9:05 AM              [facility] Medicine Services  PROGRESS NOTE    Patient Name: Betsy Chi  : 1955  MRN: [id number]    Date of Admission: 2018  Length of Stay: 11  Primary Care Physician: PRAFUL Wheatley    Subjective   Subjective     CC: f/u n/v and UTI    HPI:  Is seen resting upright in bed in no acute distress.  Labile emotions, crying frequently.  Ketamine drip currently off.  Patient states she feels a little better today.  Vision is back to normal.  Still having intermittent nausea but no vomiting.  Tolerating small amounts of food with poor appetite.  Having loose stools which she relates to stool softeners, which she is now holding.  Hopes to discharge home in the next couple of days if oral pain medication works.  No new issues.      Review of Systems  Gen- No fevers, chills  CV- No chest pain, palpitations  Resp- No cough, dyspnea  GI-  No V/D, abd pain, +nausea intermittently    Otherwise ROS is negative except as mentioned in the HPI.    Objective   Objective     Vital Signs:   Temp:  [98 °F (36.7 °C)-98.5 °F (36.9 °C)] 98.1 °F (36.7 °C)  Heart Rate:  [68-86] 68  Resp:  [17-18] 18  BP: (113-138)/(63-91) 113/83        Physical Exam:  Constitutional: No acute distress, awake, alert, chronically ill appearing.  Upright in bed.  No visitors at bedside.  HENT: NCAT, mucous membranes " "moist  Respiratory: Clear to auscultation bilaterally A/P, respiratory effort normal   Cardiovascular: RRR, no murmurs, rubs, or gallops,  Gastrointestinal: Positive bowel sounds, soft, nontender, nondistended.  Obese abd   Musculoskeletal: No bilateral ankle edema.  ESPARZA spont  Psychiatric: Appropriate affect, cooperative.  Labile emotions.  Crying frequently and states she \"can't control it\".   Neurologic: Oriented x 3, no focal deficits.  Speech clear and appropriate. Follows commands  Skin: + psoriasis patches noted to palms of hands.       Results Reviewed:  I have personally reviewed current lab, radiology, and data and agree.      Results from last 7 days  Lab Units 07/20/18  0650   WBC 10*3/mm3 5.82   HEMOGLOBIN g/dL 12.3   HEMATOCRIT % 37.8   PLATELETS 10*3/mm3 208       Results from last 7 days  Lab Units 07/20/18  0650   SODIUM mmol/L 139   POTASSIUM mmol/L 4.0   CHLORIDE mmol/L 108   CO2 mmol/L 25.0   BUN mg/dL 11   CREATININE mg/dL 0.62   GLUCOSE mg/dL 106*   CALCIUM mg/dL 8.5*     Estimated Creatinine Clearance: 93.1 mL/min (by C-G formula based on SCr of 0.62 mg/dL).  No results found for: BNP    Microbiology Results Abnormal     Procedure Component Value - Date/Time    Urine Culture - Urine, [[id number]]  (Abnormal)  (Susceptibility) Collected:  07/13/18 1636    Lab Status:  Final result Specimen:  Urine from Urine, Clean Catch Updated:  07/15/18 1032     Urine Culture >100,000 CFU/mL Escherichia coli (A)    Susceptibility      Escherichia coli     CHAMP     Ampicillin <=8 ug/ml Susceptible     Ampicillin + Sulbactam <=8/4 ug/ml Susceptible     Aztreonam <=8 ug/ml Susceptible     Cefepime <=8 ug/ml Susceptible     Cefotaxime <=2 ug/ml Susceptible     Ceftriaxone <=8 ug/ml Susceptible     Cefuroxime sodium <=4 ug/ml Susceptible     Cephalothin 16 ug/ml Intermediate     Ertapenem <=1 ug/ml Susceptible     Gentamicin <=4 ug/ml Susceptible     Levofloxacin <=2 ug/ml Susceptible     Meropenem <=1 ug/ml " Susceptible     Nitrofurantoin <=32 ug/ml Susceptible     Piperacillin + Tazobactam <=16 ug/ml Susceptible     Tetracycline <=4 ug/ml Susceptible     Tobramycin <=4 ug/ml Susceptible     Trimethoprim + Sulfamethoxazole <=2/38 ug/ml Susceptible                        Results for orders placed during the hospital encounter of 07/11/18   Adult Transthoracic Echo Complete W/ Cont if Necessary Per Protocol    Narrative · Left ventricular systolic function is normal. Estimated EF = 65%.  · Left ventricular wall thickness is consistent with mild concentric   hypertrophy.  · Left ventricular diastolic dysfunction (grade II) consistent with   pseudonormalization.  · Estimated right ventricular systolic pressure from tricuspid   regurgitation is normal (<35 mmHg).          I have reviewed the medications.    Assessment/Plan   Assessment / Plan     Hospital Problem List     * (Principal)Acute on chronic hypoxemic respiratory failure not requiring ventilatory support    Mixed anxiety depressive disorder    Atrial fibrillation (CMS/HCC)    COPD (chronic obstructive pulmonary disease) (CMS/HCC)    Type 2 diabetes mellitus (CMS/HCC)    Dyslipidemia    Hypertension    Pulmonary nodule right lower lobe medially (2 x 1.5 cm).    Overview Signed 5/22/2017  1:30 PM by Janine Mckeon PA-C     Description: A.  CT scan of the chest February 11 thousand 16 reports a noncalcified 8.5-9 mm nodule in the medial portion of the right lower lobe.         Rheumatoid arthritis (CMS/HCC)    Invasive ductal carcinoma of breast, left (CMS/HCC)    Chronic hypoxemic respiratory failure (CMS/HCC)    Overview Signed 7/12/2018  2:01 PM by Blade Taylor MD     2Lnc chronically         Dyspnea    Atypical chest pain    Acute on chronic diastolic CHF (congestive heart failure) (CMS/HCC)    Constipation         Brief Hospital Course to date:  Betsy Chi is a 62 y.o. female w/ metastatic breast cancer, copd, chronic 2Lnc use, ? Hx afib, dm2 ra  "presented as direct admisison from dr. mendoza office for multiple complaints including diffuse pain, dyspnea.      Assessment & Plan:  - Abdominal pain and vomiting, etiology unknown, continue supportive therapy as below. IVF, antiemetics  ---slowly improving, still with intermittent nausea, no vomiting, but poor appetite     - BRET, suspect this was pre-renal, sec to n/v, Cr baseline is 0.7-0.8.    Resolved after IVF.      - Ecoli UTI, s/p merrem, now on cefdinir - end date in place    - Metastatic breast CA , Dr Mendoza following, continue letrozole, s/p zometa 7/12, plan to start ibrance once she returns home  -- palliative following for symptoms.  Ketamine gtt dcd yesterday due to vision issues reported.  Poss plans to transition to PO ketamine at discharge if can tolerate, pending palliative recs      - Acute respiratory failure, suspect volume overload, echo normal ef, diastolic dysfunction, ct angio no pulm embolism, continue prn diuresis, O2 supp, hold on steroids  Improved     - Back pain with diffuse \"bone pain\", suspect this is sec to bony mets, palliative consulted and assisting. Palliative managing pain control.   Pt report a little improvement noted.     - Cards & heme-onc & palliative following (no ischemic evaluation, treat medically), suspected Afib per cards notes (data deficient)  - Continue bowel regimen (miralax bid, senokot bid, prn dulcolax) as constipation likely causing some discomfort  - steroid cream to rash low back  --ok to dc home per oncology per Dr Dailey note 7/21 when medically ready     Dispo - home early to mid week once symptoms better controlled.    DVT Prophylaxis: [facility]     CODE STATUS:   Code Status and Medical Interventions:   Ordered at: 07/20/18 1308     Limited Support to NOT Include:    Vasopressors    Intubation    Cardioversion/Defibrillation     Code Status:    No CPR     Medical Interventions (Level of Support Prior to Arrest):    Limited       Disposition: anticipate d/c " "home once pain is reasonably controlled, ??TBD    Electronically signed by PRAFUL Murdock, 07/22/18, 12:54 PM.      Electronically signed by PRAFUL Murdock at 7/22/2018  1:01 PM     Mesha Arguello MD at 7/21/2018  7:47 PM          Palliative Care Progress Note    Date of Admission: 7/11/2018    Subjective:    Denies pain at present.  Says she actually had a good night.  Does, however, report crying all morning and doesn't know why.      Developed nausea and double vision so Ketamine infusion on hold since about 12:30.  Pt worried dose is too high.  Feeling better now (time of visit around 1400).  Ate soup and jello for lunch w/o difficulty.      Current Code Status     Date Active Code Status Order ID Comments User Context       7/11/2018 12:54 PM CPR [id number]  Ania De Leon, DO Inpatient       Questions for Current Code Status     Question Answer Comment    Code Status CPR     Medical Interventions (Level of Support Prior to Arrest) Full     Level Of Support Discussed With Patient         Meds reviewed: ativan Q8, marinol BID    ketamine (KETALAR) infusion 7 mcg/kg/min Last Rate: Stopped (07/21/18 1220)       Objective: /75 (BP Location: Right arm, Patient Position: Sitting)   Pulse 74   Temp 98 °F (36.7 °C) (Oral)   Resp 17   Ht 160 cm (62.99\")   Wt 82.1 kg (181 lb)   SpO2 (!) 89%   BMI 32.07 kg/m²       Intake/Output Summary (Last 24 hours) at 07/21/18 1947  Last data filed at 07/21/18 1302   Gross per 24 hour   Intake              150 ml   Output                0 ml   Net              150 ml     Physical Exam:  Gen: Chronically ill appearing female, in bed, looks unhappy  Head/neck: NC/AT, trachea midline  EENT: EOMI, patent nares, mucous membranes moist  Resp: Clear to auscultation bilaterally, respiratory effort normal   CV: RRR, no murmur  Abd: Soft, nontender, nondistended  Ext: No bilateral ankle edema  Skin: warm, dry, psoriasis noted on hands  Neuro: " Awake, alert, no focal deficit, no myoclonus  Psychiatric: Congruent affect - sad, worried      Results from last 7 days  Lab Units 07/20/18  0650   WBC 10*3/mm3 5.82   HEMOGLOBIN g/dL 12.3   HEMATOCRIT % 37.8   PLATELETS 10*3/mm3 208       Results from last 7 days  Lab Units 07/20/18  0650   SODIUM mmol/L 139   POTASSIUM mmol/L 4.0   CHLORIDE mmol/L 108   CO2 mmol/L 25.0   BUN mg/dL 11   CREATININE mg/dL 0.62   CALCIUM mg/dL 8.5*   GLUCOSE mg/dL 106*       Impression:   63yo F with Breast cancer metastatic to bone    Symptoms:  Back pain and diffuse Bone pain  Nausea  Double vision, resolved  Dysphagia  Dyspnea  Depression, mood lability    Plan:    -Acute double vision resolved since ketamine infusion held.  Pt asks to hold off resuming infusion for now.  -If pain exacerbation today, will resume infusion at lower dose.    -If pt tolerates pain w/o ketamine infusion, will consider going ahead and initiating oral ketamine tomorrow in anticipation of therapy for home.  -Code status DNR per discussion with Dr. Ojeda.  Hospice also consulted to provide pt about home care.      Mesha Arguello MD  07/21/18  7:47 PM        Electronically signed by Mesha Arguello MD at 7/21/2018  8:04 PM     Monica Weiss MD at 7/21/2018  7:24 PM          S: This morning pain seems to be better controlled.  However she has significant emotional lability.  She is been crying all morning.      Past medical history, social history and family history was reviewed and unchanged from prior visit.    Review of Systems:    Review of Systems   A comprehensive 14 point review of systems was performed and was negative except as mentioned.      Medications:  The current medication list was reviewed in the EMR    ALLERGIES:    Allergies   Allergen Reactions   • Ampicillin Anaphylaxis     Tolerated cefdinir   • Erythromycin Anaphylaxis   • Morphine And Related Anaphylaxis   • Oxycodone-Acetaminophen Anaphylaxis   • Oxycodone-Aspirin  "Anaphylaxis   • Penicillins Anaphylaxis     Tolerated cefdinir   • Sulfa Antibiotics Swelling and Rash   • Fentanyl Hives     Hives, itching   • Aspirin GI Bleeding   • Codeine Swelling   • Contrast Dye Swelling   • Ibuprofen GI Bleeding   • Latex Arrhythmia   • Propoxyphene Nausea And Vomiting   • Saccharin Nausea And Vomiting   • Tramadol Swelling         Physical Exam    VITAL SIGNS:  /75 (BP Location: Right arm, Patient Position: Sitting)   Pulse 74   Temp 98 °F (36.7 °C) (Oral)   Resp 17   Ht 160 cm (62.99\")   Wt 82.1 kg (181 lb)   SpO2 (!) 89%   BMI 32.07 kg/m²    Temp:  [98 °F (36.7 °C)-98.6 °F (37 °C)] 98 °F (36.7 °C)      Performance Status: 1    Physical Exam    General: well appearing, very emotional  HEENT: sclera anicteric, oropharynx clear, neck is supple  Lymphatics: no cervical, supraclavicular, or axillary adenopathy  Cardiovascular: regular rate and rhythm, no murmurs, rubs or gallops  Lungs: clear to auscultation bilaterally  Abdomen: soft, nontender, nondistended.  No palpable organomegaly  Extremities: no lower extremity edema  Skin: no rashes, lesions, bruising, or petechiae  Msk:  Shows no weakness of the large muscle groups  Psych: Mood is fairly down        RECENT LABS:    Lab Results   Component Value Date    HGB 12.3 07/20/2018    HCT 37.8 07/20/2018    MCV 92.9 07/20/2018     07/20/2018    WBC 5.82 07/20/2018    NEUTROABS 4.87 07/11/2018    LYMPHSABS 1.63 07/11/2018    MONOSABS 0.72 07/11/2018    EOSABS 0.09 07/11/2018    BASOSABS 0.03 07/11/2018       Lab Results   Component Value Date    GLUCOSE 106 (H) 07/20/2018    BUN 11 07/20/2018    CREATININE 0.62 07/20/2018     07/20/2018    K 4.0 07/20/2018     07/20/2018    CO2 25.0 07/20/2018    CALCIUM 8.5 (L) 07/20/2018    PROTEINTOT 7.8 07/11/2018    ALBUMIN 4.94 (H) 07/11/2018    BILITOT 0.8 07/11/2018    ALKPHOS 138 (H) 07/11/2018    AST 24 07/11/2018    ALT 28 07/11/2018         Assessment/Plan    1.  " Metastatic breast cancer: Patient currently on letrozole.  She will start Ibrance at home.    2.  Pain: Patient currently on ketamine drip.  Pain seems better control.  We defined a nice transition from home.  Palliative following for this.    3.  Severe depression: There is data to suggest ketamine drip can help improve this.  Hopefully she starts feeling better in the next several days.      Patient can be discharged home once pain is controlled.        Monica Weiss MD  [facility]    2018     Please note that portions of this note may have been completed with a voice recognition program. Efforts were made to edit the dictations, but occasionally words are mistranscribed.    Electronically signed by Monica Weiss MD at 2018  7:28 PM     Shahriar Kellogg MD at 2018  3:48 PM              [facility]  PROGRESS NOTE    Patient Name: Betsy Chi  : 1955  MRN: [id number]    Date of Admission: 2018  Length of Stay: 10  Primary Care Physician: PRAFUL Wheatley    Subjective   Subjective     CC: f/u n/v and UTI    HPI:  Didn't feel well this morning.  [place] like ketamine gtt was too high, + double vision.  Currently turned off and she is feeling better.  No other complaints.    Review of Systems  Gen- No fevers, chills  CV- No chest pain, palpitations  Resp- No cough, dyspnea  GI-  No V/D, abd pain, +nausea intermittently    Otherwise ROS is negative except as mentioned in the HPI.    Objective   Objective     Vital Signs:   Temp:  [98.1 °F (36.7 °C)-98.6 °F (37 °C)] 98.5 °F (36.9 °C)  Heart Rate:  [69-90] 78  Resp:  [18] 18  BP: (109-129)/(61-67) 118/66        Physical Exam:  Constitutional: No acute distress, awake, alert, chronically ill appearing  HENT: NCAT, mucous membranes moist  Respiratory: Clear to auscultation bilaterally, respiratory effort normal   Cardiovascular: RRR, no murmurs, rubs, or  gallops,  Gastrointestinal: Positive bowel sounds, soft, nontender, nondistended  Musculoskeletal: No bilateral ankle edema  Psychiatric: Appropriate affect, cooperative  Neurologic: Oriented x 3, no focal deficits  Skin: + psoriasis patches      Results Reviewed:  I have personally reviewed current lab, radiology, and data and agree.      Results from last 7 days  Lab Units 07/20/18  0650   WBC 10*3/mm3 5.82   HEMOGLOBIN g/dL 12.3   HEMATOCRIT % 37.8   PLATELETS 10*3/mm3 208       Results from last 7 days  Lab Units 07/20/18  0650   SODIUM mmol/L 139   POTASSIUM mmol/L 4.0   CHLORIDE mmol/L 108   CO2 mmol/L 25.0   BUN mg/dL 11   CREATININE mg/dL 0.62   GLUCOSE mg/dL 106*   CALCIUM mg/dL 8.5*     Estimated Creatinine Clearance: 93.1 mL/min (by C-G formula based on SCr of 0.62 mg/dL).  No results found for: BNP    Microbiology Results Abnormal     Procedure Component Value - Date/Time    Urine Culture - Urine, [[id number]]  (Abnormal)  (Susceptibility) Collected:  07/13/18 1636    Lab Status:  Final result Specimen:  Urine from Urine, Clean Catch Updated:  07/15/18 1032     Urine Culture >100,000 CFU/mL Escherichia coli (A)    Susceptibility      Escherichia coli     CHAMP     Ampicillin <=8 ug/ml Susceptible     Ampicillin + Sulbactam <=8/4 ug/ml Susceptible     Aztreonam <=8 ug/ml Susceptible     Cefepime <=8 ug/ml Susceptible     Cefotaxime <=2 ug/ml Susceptible     Ceftriaxone <=8 ug/ml Susceptible     Cefuroxime sodium <=4 ug/ml Susceptible     Cephalothin 16 ug/ml Intermediate     Ertapenem <=1 ug/ml Susceptible     Gentamicin <=4 ug/ml Susceptible     Levofloxacin <=2 ug/ml Susceptible     Meropenem <=1 ug/ml Susceptible     Nitrofurantoin <=32 ug/ml Susceptible     Piperacillin + Tazobactam <=16 ug/ml Susceptible     Tetracycline <=4 ug/ml Susceptible     Tobramycin <=4 ug/ml Susceptible     Trimethoprim + Sulfamethoxazole <=2/38 ug/ml Susceptible                        Results for orders placed during the  hospital encounter of 07/11/18   Adult Transthoracic Echo Complete W/ Cont if Necessary Per Protocol    Narrative · Left ventricular systolic function is normal. Estimated EF = 65%.  · Left ventricular wall thickness is consistent with mild concentric   hypertrophy.  · Left ventricular diastolic dysfunction (grade II) consistent with   pseudonormalization.  · Estimated right ventricular systolic pressure from tricuspid   regurgitation is normal (<35 mmHg).          I have reviewed the medications.    Assessment/Plan   Assessment / Plan     Hospital Problem List     * (Principal)Acute on chronic hypoxemic respiratory failure not requiring ventilatory support    Mixed anxiety depressive disorder    Atrial fibrillation (CMS/HCC)    COPD (chronic obstructive pulmonary disease) (CMS/HCC)    Type 2 diabetes mellitus (CMS/HCC)    Dyslipidemia    Hypertension    Pulmonary nodule right lower lobe medially (2 x 1.5 cm).    Overview Signed 5/22/2017  1:30 PM by Janine Mckeon PA-C     Description: A.  CT scan of the chest February 11 thousand 16 reports a noncalcified 8.5-9 mm nodule in the medial portion of the right lower lobe.         Rheumatoid arthritis (CMS/HCC)    Invasive ductal carcinoma of breast, left (CMS/HCC)    Chronic hypoxemic respiratory failure (CMS/HCC)    Overview Signed 7/12/2018  2:01 PM by Blade Taylor MD     2Lnc chronically         Dyspnea    Atypical chest pain    Acute on chronic diastolic CHF (congestive heart failure) (CMS/HCC)    Constipation         Brief Hospital Course to date:  Betsy Chi is a 62 y.o. female w/ metastatic breast cancer, copd, chronic 2Lnc use, ? Hx afib, dm2 ra presented as direct admisison from dr. centeno office for multiple complaints including diffuse pain, dyspnea.      Assessment & Plan:  - Abdominal pain and vomiting, etiology unknown, continue supportive therapy as below. IVF, antiemetics  ---slowly improving, now with intermittent nausea, no vomiting, but  "poor appetite     - BRET, suspect this is pre-renal, sec to n/v, Cr baseline is 0.7-0.8.  Resolved after IVF.      - Ecoli UTI, s/p merrem, now on cefdinir - end date in place      - Metastatic breast CA , Dr Mendoza following, continue letrozole, s/p zometa 7/12, plan to start ibrance once she returns home  -- palliative following for symptoms.  On ketamine gtt with plans to transition to PO at discharge.  Currently off, will defer to palliative on resuming/dose.    - Acute respiratory failure, suspect volume overload, echo normal ef, diastolic dysfunction, ct angio no pulm embolism, continue prn diuresis, O2 supp, hold on steroids  Improved     - Back pain with diffuse \"bone pain\", suspect this is sec to bony mets, palliative consulted and assisting. Palliative started on Ketamine infusion  for pain control.   Pt report a little improvement noted.     - Cards & heme-onc & palliative following (no ischemic evaluation, treat medically), suspected Afib per cards notes (data deficient)  - Continue bowel regimen (miralax bid, senokot bid, prn dulcolax) as constipation likely causing some discomfort  - steroid cream to rash low back     Dispo - home early to mid week once symptoms better controlled.    DVT Prophylaxis: [facility]     CODE STATUS:   Code Status and Medical Interventions:   Ordered at: 07/20/18 1308     Limited Support to NOT Include:    Vasopressors    Intubation    Cardioversion/Defibrillation     Code Status:    No CPR     Medical Interventions (Level of Support Prior to Arrest):    Limited       Disposition: anticipate d/c home once pain is reasonably controlled, ??TBD    Electronically signed by Shahriar Kellogg MD, 07/21/18, 3:48 PM.      Electronically signed by Shahriar Kellogg MD at 7/21/2018  3:53 PM       Isatu Murcia, RN Registered Nurse Signed Case Management  Progress Notes Date of Service: 7/20/2018  2:38 PM         []Manual[]Template  []Copied  Continued Stay Note  SANA Mccarty     Patient Name: " Betsy Chi                 MRN: [id number]  Today's Date: 7/20/2018                     Admit Date: 7/11/2018                       Discharge Plan      Row Name 07/20/18 1435           Plan     Plan ongoing     Patient/Family in Agreement with Plan yes     Plan Comments Patient is not yet ready for discharge. Ketamine drip will likely be running over the weekend according to Dr Adams note and there is a new hospice consult as well. Currently the patient still hopes to return home when medically ready where she lives with her nephew - CM following - aw [id number]                  Discharge Codes    No documentation.               Isatu Murcia RN

## 2018-07-23 NOTE — PROGRESS NOTES
Continued Stay Note  [facility]     Patient Name: Betsy Chi  MRN: [id number]  Today's Date: 7/23/2018    Admit Date: 7/11/2018          Discharge Plan     Row Name 07/23/18 1554       Plan    Plan Ongoing     Patient/Family in Agreement with Plan yes    Plan Comments Revisited with patient at bedside - Her goal remains to return home where she lives with her nephew. She did voice the need for a wheelchair due to her severe pain and weakness at times and need to have a way to get to all of her follow up appointments. Wheelchair order sent to [facility] where her oxygen is supplied -  following - aw [phone]              Discharge Codes    No documentation.           Isatu Murcia RN

## 2018-07-23 NOTE — PLAN OF CARE
Problem: Patient Care Overview  Goal: Plan of Care Review  Outcome: Ongoing (interventions implemented as appropriate)   07/23/18 0408   Coping/Psychosocial   Plan of Care Reviewed With patient   Plan of Care Review   Progress improving   OTHER   Outcome Summary Pt with PO Ketamine this shift. Does not like taste, but states it did help her pain. Pt also less tearful during this shift than at the beginning of shift. Possible D/C today since ketamine switched to PO       Problem: Skin Injury Risk (Adult)  Goal: Identify Related Risk Factors and Signs and Symptoms  Outcome: Ongoing (interventions implemented as appropriate)    Goal: Skin Health and Integrity  Outcome: Ongoing (interventions implemented as appropriate)      Problem: Fall Risk (Adult)  Goal: Identify Related Risk Factors and Signs and Symptoms  Outcome: Ongoing (interventions implemented as appropriate)    Goal: Absence of Fall  Outcome: Ongoing (interventions implemented as appropriate)      Problem: Palliative Care (Adult)  Goal: Identify Related Risk Factors and Signs and Symptoms  Outcome: Ongoing (interventions implemented as appropriate)    Goal: Maximized Comfort  Outcome: Ongoing (interventions implemented as appropriate)    Goal: Enhanced Quality of Life  Outcome: Ongoing (interventions implemented as appropriate)

## 2018-07-23 NOTE — PROGRESS NOTES
Palliative Care Progress Note    Date of Admission: 7/11/2018    Subjective:  Patient feels that her pain is somewhat better controlled today.  States that she has been up walking around quite a bit over the weekend.  Current Code Status     Date Active Code Status Order ID Comments User Context       7/11/2018 12:54 PM CPR [id number]  Ania De Leon, DO Inpatient       Questions for Current Code Status     Question Answer Comment    Code Status CPR     Medical Interventions (Level of Support Prior to Arrest) Full     Level Of Support Discussed With Patient         No current facility-administered medications on file prior to encounter.      Current Outpatient Prescriptions on File Prior to Encounter   Medication Sig Dispense Refill   • albuterol (PROVENTIL HFA;VENTOLIN HFA) 108 (90 Base) MCG/ACT inhaler Inhale 2 puffs Every 4 (Four) Hours As Needed for Wheezing. 1 inhaler 2   • aluminum acetate (DOMEBORO) pack packet Apply  topically 3 (Three) Times a Day As Needed (psoriatic arthritis). 100 each 6   • amLODIPine (NORVASC) 10 MG tablet TAKE ONE TABLET BY MOUTH DAILY 30 tablet 0   • atorvastatin (LIPITOR) 20 MG tablet Take 1 tablet by mouth Every Night. 90 tablet 0   • carvedilol (COREG) 12.5 MG tablet TAKE ONE TABLET BY MOUTH TWICE A DAY WITH MEALS 60 tablet 0   • Cranberry 600 MG tablet Take 1 tablet by mouth Daily.     • glucose blood test strip Use as instructed 100 each 12   • glucose monitor monitoring kit 1 each 3 (Three) Times a Day As Needed (hypo/hyperglycemia). 1 each 3   • Lancets (ACCU-CHEK SOFT TOUCH) lancets Three times daily and as needed 100 each 12   • letrozole (FEMARA) 2.5 MG tablet Take 1 tablet by mouth Daily. 30 tablet 11   • ondansetron (ZOFRAN) 8 MG tablet Take 1 tablet by mouth 3 (Three) Times a Day As Needed for Nausea or Vomiting. 30 tablet 5   • pantoprazole (PROTONIX) 40 MG EC tablet Take 1 tablet by mouth Daily. 90 tablet 2       ketamine (KETALAR) infusion 7 mcg/kg/min Last Rate:  "Stopped (07/21/18 1220)     •  acetaminophen  •  aluminum acetate  •  bisacodyl  •  diphenhydrAMINE **OR** diphenhydrAMINE  •  ipratropium-albuterol  •  LORazepam  •  ondansetron  •  polyethylene glycol  •  sodium chloride    Objective: /69   Pulse 72   Temp 98.5 °F (36.9 °C) (Oral)   Resp 18   Ht 160 cm (62.99\")   Wt 82.1 kg (181 lb)   SpO2 96%   BMI 32.07 kg/m²      Intake/Output Summary (Last 24 hours) at 07/23/18 1348  Last data filed at 07/23/18 1245   Gross per 24 hour   Intake              560 ml   Output                0 ml   Net              560 ml     Physical Exam:      General Appearance:    Alert, cooperative,    Head:    Normocephalic, without obvious abnormality, atraumatic   Eyes:            Lids and lashes normal, conjunctivae and sclerae normal, no   icterus, no pallor, corneas clear, PERRLA   Ears:    Ears appear intact with no abnormalities noted   Throat:   No oral lesions, no thrush, oral mucosa moist   Neck:   No adenopathy, supple, trachea midline, no thyromegaly, no     carotid bruit, no JVD   Back:     No kyphosis present, no scoliosis present, no skin lesions,       erythema or scars, no tenderness to percussion or                   palpation,   range of motion normal   Lungs:     Clear to auscultation,respirations regular, even and                   unlabored    Heart:    Regular rhythm and normal rate, normal S1 and S2, no            murmur, no gallop, no rub, no click   Breast Exam:    Deferred   Abdomen:     Normal bowel sounds, no masses, no organomegaly, soft        non-tender, non-distended, no guarding, no rebound                 tenderness   Genitalia:    Deferred   Extremities:   Swelling noted over base of left thumb.  No tenderness in snuff box noted.   Pulses:   Pulses palpable and equal bilaterally   Skin:   No bleeding, bruising or rash   Lymph nodes:   No palpable adenopathy   Neurologic:   Cranial nerves 2 - 12 grossly intact, sensation intact, DTR        " present and equal bilaterally       Results from last 7 days  Lab Units 07/20/18  0650   WBC 10*3/mm3 5.82   HEMOGLOBIN g/dL 12.3   HEMATOCRIT % 37.8   PLATELETS 10*3/mm3 208       Results from last 7 days  Lab Units 07/20/18  0650   SODIUM mmol/L 139   POTASSIUM mmol/L 4.0   CHLORIDE mmol/L 108   CO2 mmol/L 25.0   BUN mg/dL 11   CREATININE mg/dL 0.62   CALCIUM mg/dL 8.5*   GLUCOSE mg/dL 106*       Impression: Breast cancer with bone metastasis  Dysphagia  Dyspnea  Goals of care  Plan:  Hospice case manager to talk to patient today.  I will continue to current dose of ketamine and with patient decides whether not she wants hospice we will look at what route is viable.        Nadir Ojeda,   07/23/18  1:48 PM

## 2018-07-23 NOTE — PLAN OF CARE
Problem: Patient Care Overview  Goal: Interprofessional Rounds/Family Conf  Outcome: Ongoing (interventions implemented as appropriate)  13:00 Palliative Team Conference: LEVI Maldonado RN, CHPN; SAVI Hoffman, CHPN; GARETT Mcfarland, APRN; AVE Velasquez, [facility], [facility]; BRIAN Ojeda, ; GORDON John MDiv   07/23/18 1429   Interdisciplinary Rounds/Family Conf   Summary Pt reported pain improved with oral ketamine. Dr. Ojeda pursuing community resource for continuing medication after discharge. Hospice Liaison to see today.       Problem: Palliative Care (Adult)  Intervention: Minimize Discomfort   07/23/18 1429   Promote Oxygenation/Perfusion   Pain Management Interventions pain management plan reviewed with patient/caregiver;see MAR   Coping Strategies   Complementary Therapy massage therapy  (per EUSEBIA Crum LMT)     Intervention: Optimize Function   07/23/18 1429   Musculoskeletal Interventions   Fatigue Management paced activity encouraged       Goal: Maximized Comfort  Outcome: Ongoing (interventions implemented as appropriate)   07/23/18 1429   Palliative Care (Adult)   Maximized Comfort making progress toward outcome     Goal: Enhanced Quality of Life  Outcome: Ongoing (interventions implemented as appropriate)   07/23/18 1429   Palliative Care (Adult)   Enhanced Quality of Life making progress toward outcome

## 2018-07-23 NOTE — DISCHARGE PLACEMENT REQUEST
"PLEASE DISREGARD PREVIOUS ORDER FOR WHEELCHAIR - SENT IN ERROR     THANK YOU  SHARMAINE GRAVES RN/CM         Kera Chi (62 y.o. Female)     Date of Birth Social Security Number Address Home Phone MRN    1955  [address] [id number] [phone] [id number]    Confucianist Marital Status          Alevism Legally        Admission Date Admission Type Admitting Provider Attending Provider Department, Room/Bed    7/11/18 Elective Shahriar Kellogg MD Dossett, Lee M, MD [facility] 6B, N625/1    Discharge Date Discharge Disposition Discharge Destination                       Attending Provider:  Shahriar Kellogg MD    Allergies:  Ampicillin, Erythromycin, Morphine And Related, Oxycodone-acetaminophen, Oxycodone-aspirin, Penicillins, Sulfa Antibiotics, Fentanyl, Aspirin, Codeine, Contrast Dye, Ibuprofen, Latex, Propoxyphene, Saccharin, Tramadol    Isolation:  None   Infection:  None   Code Status:  No CPR    Ht:  160 cm (62.99\")   Wt:  82.1 kg (181 lb)    Admission Cmt:  None   Principal Problem:  Acute on chronic hypoxemic respiratory failure not requiring ventilatory support [J96.91]                 Active Insurance as of 7/11/2018     Primary Coverage     Payor Plan Insurance Group Employer/Plan Group    PASSPORT PASSPORT MEDICAID     Payor Plan Address Payor Plan Phone Number Effective From Effective To    [address] [phone] 11/1/1997     [facility] [id number]       Subscriber Name Subscriber Birth Date Member ID       KERA CHI 1955 [id number]                 Emergency Contacts      (Rel.) Home Phone Work Phone Mobile Phone    Fely Stringer (Relative) [phone] -- --    Camacho Stringer (Son) -- -- [phone]    Niya Stringer (Grandchild) [phone] -- --            Problem List           Codes Noted - Resolved       Hospital    Chronic hypoxemic respiratory failure (CMS/HCC) ICD-10-CM: J96.11  ICD-9-CM: 518.83, 799.02 7/12/2018 - " Present    Dyspnea ICD-10-CM: R06.00  ICD-9-CM: 786.09 7/12/2018 - Present    Atypical chest pain ICD-10-CM: R07.89  ICD-9-CM: 786.59 7/12/2018 - Present    Acute on chronic diastolic CHF (congestive heart failure) (CMS/[facility]) ICD-10-CM: I50.33  ICD-9-CM: 428.33, 428.0 7/12/2018 - Present    Constipation ICD-10-CM: K59.00  ICD-9-CM: 564.00 7/12/2018 - Present    Invasive ductal carcinoma of breast, left (CMS/[facility]) ICD-10-CM: C50.912  ICD-9-CM: 174.9 5/7/2018 - Present    * (Principal)Acute on chronic hypoxemic respiratory failure not requiring ventilatory support ICD-10-CM: J96.91  ICD-9-CM: 518.81 4/23/2018 - Present    Mixed anxiety depressive disorder ICD-10-CM: F41.8  ICD-9-CM: 300.4 5/22/2017 - Present    Atrial fibrillation (CMS/[facility]) ICD-10-CM: I48.91  ICD-9-CM: 427.31 5/22/2017 - Present    COPD (chronic obstructive pulmonary disease) (CMS/[facility]) ICD-10-CM: J44.9  ICD-9-CM: 496 5/22/2017 - Present    Type 2 diabetes mellitus (CMS/[facility]) ICD-10-CM: E11.9  ICD-9-CM: 250.00 5/22/2017 - Present    Dyslipidemia ICD-10-CM: E78.5  ICD-9-CM: 272.4 5/22/2017 - Present    Hypertension ICD-10-CM: I10  ICD-9-CM: 401.9 5/22/2017 - Present    Pulmonary nodule right lower lobe medially (2 x 1.5 cm). ICD-10-CM: R91.8  ICD-9-CM: 786.6 5/22/2017 - Present    Rheumatoid arthritis (CMS/[facility]) ICD-10-CM: M06.9  ICD-9-CM: 714.0 5/22/2017 - Present       Non-Hospital    Palpitations ICD-10-CM: R00.2  ICD-9-CM: 785.1 7/2/2018 - Present    Open angle with borderline findings and low glaucoma risk in both eyes ICD-10-CM: H40.013  ICD-9-CM: 365.01 6/27/2018 - Present    Bone metastases (CMS/HCC) ICD-10-CM: C79.51  ICD-9-CM: 198.5 6/8/2018 - Present    Asthma ICD-10-CM: J45.909  ICD-9-CM: 493.90 5/22/2017 - Present    Coronary arteriosclerosis in native artery ICD-10-CM: I25.10  ICD-9-CM: 414.01 5/22/2017 - Present    Biliary dyskinesia ICD-10-CM: K82.8  ICD-9-CM: 575.8 5/22/2017 - Present    History of Cerebral infarction (CT scan of brain this  hospitalization normal) ICD-10-CM: I63.9  ICD-9-CM: 434.91 5/22/2017 - Present    Chronic bronchitis (CMS/HCC) ICD-10-CM: J42  ICD-9-CM: 491.9 5/22/2017 - Present    Chronic low back pain ICD-10-CM: M54.5, G89.29  ICD-9-CM: 724.2, 338.29 5/22/2017 - Present    Chronic urinary tract infection ICD-10-CM: N39.0  ICD-9-CM: 599.0 5/22/2017 - Present    Hepatic cirrhosis (CMS/HCC) ICD-10-CM: K74.60  ICD-9-CM: 571.5 5/22/2017 - Present    Fibromyalgia ICD-10-CM: M79.7  ICD-9-CM: 729.1 5/22/2017 - Present    Gastroesophageal reflux disease without esophagitis ICD-10-CM: K21.9  ICD-9-CM: 530.81 5/22/2017 - Present    Disorder of kidney ICD-10-CM: N28.9  ICD-9-CM: 593.9 5/22/2017 - Present    Disorder of endocrine system ICD-10-CM: E34.9  ICD-9-CM: 259.9 5/22/2017 - Present    Osteoarthritis ICD-10-CM: M19.90  ICD-9-CM: 715.90 5/22/2017 - Present    Osteoporosis ICD-10-CM: M81.0  ICD-9-CM: 733.00 5/22/2017 - Present    Seasonal allergic rhinitis ICD-10-CM: J30.2  ICD-9-CM: 477.9 5/22/2017 - Present    Biliary sludge ICD-10-CM: K83.8  ICD-9-CM: 576.8 5/22/2017 - Present    Peptic ulcer of stomach ICD-10-CM: K25.9  ICD-9-CM: 531.90 5/22/2017 - Present

## 2018-07-23 NOTE — PROGRESS NOTES
Continued Stay Note  [facility]     Patient Name: Betsy Chi  MRN: [id number]  Today's Date: 7/23/2018    Admit Date: 7/11/2018          Discharge Plan     Row Name 07/23/18 1704       Plan    Plan Undetermined    Plan Comments Chart reviewed.  Awake, alert, pleasant and interactive.  No obvious distress.  No complaints during visit.  Granddtr at bedside with 2 small great grandchildren.  One snuggled up in bed with pt.  Discussed plan for visit in AM - pt agreeable.  Will follow for hospice support and to assist/facilitate access to hospice services if elected.  If can be of further assist please contact......ext [phone].    Row Name 07/23/18 4966       Plan    Plan Ongoing     Patient/Family in Agreement with Plan yes    Plan Comments Revisited with patient at bedside - Her goal remains to return home where she lives with her nephew. She did voice the need for a wheelchair due to her severe pain and weakness at times and need to have a way to get to all of her follow up appointments. Wheelchair order sent to [facility] where her oxygen is supplied -  following - aw [phone]              Discharge Codes    No documentation.           Keira Hoffman RN

## 2018-07-23 NOTE — PROGRESS NOTES
"    [facility] Medicine Services  PROGRESS NOTE    Patient Name: Betsy Chi  : 1955  MRN: [id number]    Date of Admission: 2018  Length of Stay: 12  Primary Care Physician: PRAFUL Wheatley    Subjective   Subjective     CC: f/u n/v and UTI    HPI:  Patient is seen standing at side of bed making her bed today.  She appears to be more comfortable but when asked states she is still hurting pretty bad.  Rates her pain currently 5-6/10 scale.  She did start taking oral ketamine last night.  States she seems to be doing \"okay so far\" but is worried that she will have a reaction later today.  Tolerating small meals with intermittent nausea.  No vomiting.  No chest pain or shortness of air.  Still labile emotions crying intermittently.  No new issues.    Review of Systems  Gen- No fevers, chills  CV- No chest pain, palpitations  Resp- No cough, dyspnea  GI-  No V/D, abd pain, +nausea intermittently    Otherwise ROS is negative except as mentioned in the HPI.    Objective   Objective     Vital Signs:   Temp:  [98.2 °F (36.8 °C)-98.8 °F (37.1 °C)] 98.5 °F (36.9 °C)  Heart Rate:  [72-82] 72  Resp:  [18] 18  BP: (113-125)/(63-77) 120/69        Physical Exam:  Constitutional: No acute distress, awake, alert, chronically ill appearing.  Standing it side of bed making her bed currently.  No visitors at bedside.  HENT: NCAT, mucous membranes moist  Respiratory: Clear to auscultation bilaterally A/P, respiratory effort normal On RA  Cardiovascular: RRR, no murmurs, rubs, or gallops,  Gastrointestinal: Positive bowel sounds, soft, nontender, nondistended.  Obese abd   Musculoskeletal: No bilateral ankle edema.  ESPARZA spont  Psychiatric: Appropriate affect, cooperative.  Labile emotions.  Crying frequently States it's \"a little better today\".   Neurologic: Oriented x 3, no focal deficits.  Speech clear and appropriate. Follows commands  Skin: + psoriasis patches noted to palms of hands. "       Results Reviewed:  I have personally reviewed current lab, radiology, and data and agree.      Results from last 7 days  Lab Units 07/20/18  0650   WBC 10*3/mm3 5.82   HEMOGLOBIN g/dL 12.3   HEMATOCRIT % 37.8   PLATELETS 10*3/mm3 208       Results from last 7 days  Lab Units 07/20/18  0650   SODIUM mmol/L 139   POTASSIUM mmol/L 4.0   CHLORIDE mmol/L 108   CO2 mmol/L 25.0   BUN mg/dL 11   CREATININE mg/dL 0.62   GLUCOSE mg/dL 106*   CALCIUM mg/dL 8.5*     Estimated Creatinine Clearance: 93.1 mL/min (by C-G formula based on SCr of 0.62 mg/dL).  No results found for: BNP    Microbiology Results Abnormal     Procedure Component Value - Date/Time    Urine Culture - Urine, [[id number]]  (Abnormal)  (Susceptibility) Collected:  07/13/18 1636    Lab Status:  Final result Specimen:  Urine from Urine, Clean Catch Updated:  07/15/18 1032     Urine Culture >100,000 CFU/mL Escherichia coli (A)    Susceptibility      Escherichia coli     CHAMP     Ampicillin <=8 ug/ml Susceptible     Ampicillin + Sulbactam <=8/4 ug/ml Susceptible     Aztreonam <=8 ug/ml Susceptible     Cefepime <=8 ug/ml Susceptible     Cefotaxime <=2 ug/ml Susceptible     Ceftriaxone <=8 ug/ml Susceptible     Cefuroxime sodium <=4 ug/ml Susceptible     Cephalothin 16 ug/ml Intermediate     Ertapenem <=1 ug/ml Susceptible     Gentamicin <=4 ug/ml Susceptible     Levofloxacin <=2 ug/ml Susceptible     Meropenem <=1 ug/ml Susceptible     Nitrofurantoin <=32 ug/ml Susceptible     Piperacillin + Tazobactam <=16 ug/ml Susceptible     Tetracycline <=4 ug/ml Susceptible     Tobramycin <=4 ug/ml Susceptible     Trimethoprim + Sulfamethoxazole <=2/38 ug/ml Susceptible                        Results for orders placed during the hospital encounter of 07/11/18   Adult Transthoracic Echo Complete W/ Cont if Necessary Per Protocol    Narrative · Left ventricular systolic function is normal. Estimated EF = 65%.  · Left ventricular wall thickness is consistent with mild  concentric   hypertrophy.  · Left ventricular diastolic dysfunction (grade II) consistent with   pseudonormalization.  · Estimated right ventricular systolic pressure from tricuspid   regurgitation is normal (<35 mmHg).          I have reviewed the medications.    Assessment/Plan   Assessment / Plan     Hospital Problem List     * (Principal)Acute on chronic hypoxemic respiratory failure not requiring ventilatory support    Mixed anxiety depressive disorder    Atrial fibrillation (CMS/HCC)    COPD (chronic obstructive pulmonary disease) (CMS/HCC)    Type 2 diabetes mellitus (CMS/HCC)    Dyslipidemia    Hypertension    Pulmonary nodule right lower lobe medially (2 x 1.5 cm).    Overview Signed 5/22/2017  1:30 PM by Janine Mckeon PA-C     Description: A.  CT scan of the chest February 11 thousand 16 reports a noncalcified 8.5-9 mm nodule in the medial portion of the right lower lobe.         Rheumatoid arthritis (CMS/HCC)    Invasive ductal carcinoma of breast, left (CMS/HCC)    Chronic hypoxemic respiratory failure (CMS/HCC)    Overview Signed 7/12/2018  2:01 PM by Blade Taylor MD     2Lnc chronically         Dyspnea    Atypical chest pain    Acute on chronic diastolic CHF (congestive heart failure) (CMS/HCC)    Constipation         Brief Hospital Course to date:  Betsy Chi is a 62 y.o. female w/ metastatic breast cancer, copd, chronic 2Lnc use, ? Hx afib, dm2 ra presented as direct admisison from dr. centeno office for multiple complaints including diffuse pain, dyspnea.      Assessment & Plan:  - Abdominal pain and vomiting, etiology unknown, continue supportive therapy as below. IVF, antiemetics  ---slowly improving, still with intermittent nausea, no vomiting, but poor appetite     - BRET, suspect this was pre-renal, sec to n/v, Cr baseline is 0.7-0.8.    Resolved after IVF.      - Ecoli UTI, s/p merrem, now on cefdinir - end date in place    - Metastatic breast CA , Dr Centeno following, continue  "letrozole, s/p zometa 7/12, plan to start ibrance once she returns home  -- palliative following for symptoms.  Ketamine gtt dcd 7/21 due to vision issues reported.  Palliative medicine visit patient again yesterday evening. She agreed to trial of oral ketamine due to her multiple medication allergies limiting her oral pain medication options.  States she is tolerating it \"okay for now\" but is very anxious that she will have an allergic reaction today or even tomorrow due to this being her history with medicines.  She wishes to stay in hospital until she knows that this medicine will work for her.  --Awaiting palliative further recommendations    - Acute respiratory failure, suspect volume overload, echo normal ef, diastolic dysfunction, ct angio no pulm embolism, continue prn diuresis, O2 supp, hold on steroids  Improved     - Back pain with diffuse \"bone pain\", suspect this is sec to bony mets, palliative consulted and assisting. Palliative managing pain control.   Pt report a little improvement noted and appears slightly more comfortable today. Up moving around room some on my exam.  Trialing oral ketamine.    - Cards & heme-onc & palliative following (no ischemic evaluation, treat medically), suspected Afib per cards notes (data deficient)  - Continue bowel regimen (miralax bid, senokot bid, prn dulcolax) as constipation likely causing some discomfort  - steroid cream to rash low back  --ok to dc home per oncology per Dr Dailey note 7/21 when medically ready     Dispo - home early to mid week once symptoms better controlled pending palliative final recommendations for pain meds    DVT Prophylaxis: [facility]     CODE STATUS:   Code Status and Medical Interventions:   Ordered at: 07/20/18 1308     Limited Support to NOT Include:    Vasopressors    Intubation    Cardioversion/Defibrillation     Code Status:    No CPR     Medical Interventions (Level of Support Prior to Arrest):    Limited       Disposition: anticipate d/c " home once pain is reasonably controlled, ??TBD    Electronically signed by Lexy Ritchie, PRAFUL, 07/23/18, 11:22 AM.

## 2018-07-23 NOTE — DISCHARGE PLACEMENT REQUEST
"Please see attached order for Wheelchair   Delivery to bedside   Thank you   Isatu zambrano [phone]      Kera Chi (62 y.o. Female)     Date of Birth Social Security Number Address Home Phone MRN    1955  [address] [id number] [phone] [id number]    Jain Marital Status          [employer]        Admission Date Admission Type Admitting Provider Attending Provider Department, Room/Bed    7/11/18 Elective Shahriar Kellogg MD Dossett, Lee M, MD [facility] 6B, N625/1    Discharge Date Discharge Disposition Discharge Destination                       Attending Provider:  Shahriar Kellogg MD    Allergies:  Ampicillin, Erythromycin, Morphine And Related, Oxycodone-acetaminophen, Oxycodone-aspirin, Penicillins, Sulfa Antibiotics, Fentanyl, Aspirin, Codeine, Contrast Dye, Ibuprofen, Latex, Propoxyphene, Saccharin, Tramadol    Isolation:  None   Infection:  None   Code Status:  No CPR    Ht:  160 cm (62.99\")   Wt:  82.1 kg (181 lb)    Admission Cmt:  None   Principal Problem:  Acute on chronic hypoxemic respiratory failure not requiring ventilatory support [J96.91]                 Active Insurance as of 7/11/2018     Primary Coverage     Payor Plan Insurance Group Employer/Plan Group    PASSPORT PASSPORT MEDICAID     Payor Plan Address Payor Plan Phone Number Effective From Effective To    [address] [phone] 11/1/1997     [facility] [address]       Subscriber Name Subscriber Birth Date Member ID       KERA CHI 1955 [id number]                 Emergency Contacts      (Rel.) Home Phone Work Phone Mobile Phone    Fely Stringer (Relative) [phone] -- --    Camacho Stringer (Son) -- -- [phone]    Niya Stringer (Grandchild) [phone] -- --        Shahriar Kellogg MD Physician Signed Hospitalists  Progress Notes Date of Service: 7/21/2018  3:48 PM      Expand All Collapse All  "   []Manual[]Template  []Copied       [facility] Medicine Services  PROGRESS NOTE     Patient Name: Betsy Chi  : 1955  MRN: [id number]     Date of Admission: 2018  Length of Stay: 10  Primary Care Physician: PRAFUL Wheatley        Subjective      Subjective      CC: f/u n/v and UTI     HPI:  Didn't feel well this morning.  [place] like ketamine gtt was too high, + double vision.  Currently turned off and she is feeling better.  No other complaints.     Review of Systems  Gen- No fevers, chills  CV- No chest pain, palpitations  Resp- No cough, dyspnea  GI-  No V/D, abd pain, +nausea intermittently     Otherwise ROS is negative except as mentioned in the HPI.        Objective      Objective      Vital Signs:   Temp:  [98.1 °F (36.7 °C)-98.6 °F (37 °C)] 98.5 °F (36.9 °C)  Heart Rate:  [69-90] 78  Resp:  [18] 18  BP: (109-129)/(61-67) 118/66     Physical Exam:  Constitutional: No acute distress, awake, alert, chronically ill appearing  HENT: NCAT, mucous membranes moist  Respiratory: Clear to auscultation bilaterally, respiratory effort normal   Cardiovascular: RRR, no murmurs, rubs, or gallops,  Gastrointestinal: Positive bowel sounds, soft, nontender, nondistended  Musculoskeletal: No bilateral ankle edema  Psychiatric: Appropriate affect, cooperative  Neurologic: Oriented x 3, no focal deficits  Skin: + psoriasis patches        Results Reviewed:  I have personally reviewed current lab, radiology, and data and agree.        Results from last 7 days  Lab Units 18  0650   WBC 10*3/mm3 5.82   HEMOGLOBIN g/dL 12.3   HEMATOCRIT % 37.8   PLATELETS 10*3/mm3 208        Results from last 7 days  Lab Units 18  0650   SODIUM mmol/L 139   POTASSIUM mmol/L 4.0   CHLORIDE mmol/L 108   CO2 mmol/L 25.0   BUN mg/dL 11   CREATININE mg/dL 0.62   GLUCOSE mg/dL 106*   CALCIUM mg/dL 8.5*     Estimated Creatinine Clearance: 93.1 mL/min (by C-G formula based on SCr of 0.62 mg/dL).  No  results found for: BNP             Microbiology Results Abnormal      Procedure Component Value - Date/Time     Urine Culture - Urine, [[id number]]  (Abnormal)  (Susceptibility) Collected:  07/13/18 1636     Lab Status:  Final result Specimen:  Urine from Urine, Clean Catch Updated:  07/15/18 1032       Urine Culture >100,000 CFU/mL Escherichia coli (A)             Susceptibility                Escherichia coli        CHAMP        Ampicillin <=8 ug/ml Susceptible        Ampicillin + Sulbactam <=8/4 ug/ml Susceptible        Aztreonam <=8 ug/ml Susceptible        Cefepime <=8 ug/ml Susceptible        Cefotaxime <=2 ug/ml Susceptible        Ceftriaxone <=8 ug/ml Susceptible        Cefuroxime sodium <=4 ug/ml Susceptible        Cephalothin 16 ug/ml Intermediate        Ertapenem <=1 ug/ml Susceptible        Gentamicin <=4 ug/ml Susceptible        Levofloxacin <=2 ug/ml Susceptible        Meropenem <=1 ug/ml Susceptible        Nitrofurantoin <=32 ug/ml Susceptible        Piperacillin + Tazobactam <=16 ug/ml Susceptible        Tetracycline <=4 ug/ml Susceptible        Tobramycin <=4 ug/ml Susceptible        Trimethoprim + Sulfamethoxazole <=2/38 ug/ml Susceptible                                       Results for orders placed during the hospital encounter of 07/11/18   Adult Transthoracic Echo Complete W/ Cont if Necessary Per Protocol     Narrative · Left ventricular systolic function is normal. Estimated EF = 65%.  · Left ventricular wall thickness is consistent with mild concentric   hypertrophy.  · Left ventricular diastolic dysfunction (grade II) consistent with   pseudonormalization.  · Estimated right ventricular systolic pressure from tricuspid   regurgitation is normal (<35 mmHg).            I have reviewed the medications.        Assessment/Plan      Assessment / Plan          Hospital Problem List      * (Principal)Acute on chronic hypoxemic respiratory failure not requiring ventilatory support     Mixed anxiety  depressive disorder     Atrial fibrillation (CMS/HCC)     COPD (chronic obstructive pulmonary disease) (CMS/HCC)     Type 2 diabetes mellitus (CMS/HCC)     Dyslipidemia     Hypertension     Pulmonary nodule right lower lobe medially (2 x 1.5 cm).     Overview Signed 5/22/2017  1:30 PM by Janine Mckeon PA-C       Description: A.  CT scan of the chest February 11 thousand 16 reports a noncalcified 8.5-9 mm nodule in the medial portion of the right lower lobe.           Rheumatoid arthritis (CMS/HCC)     Invasive ductal carcinoma of breast, left (CMS/HCC)     Chronic hypoxemic respiratory failure (CMS/HCC)     Overview Signed 7/12/2018  2:01 PM by Blade Taylor MD       2Lnc chronically           Dyspnea     Atypical chest pain     Acute on chronic diastolic CHF (congestive heart failure) (CMS/HCC)     Constipation          Brief Hospital Course to date:  Betsy Chi is a 62 y.o. female w/ metastatic breast cancer, copd, chronic 2Lnc use, ? Hx afib, dm2 ra presented as direct admisison from dr. centeno office for multiple complaints including diffuse pain, dyspnea.      Assessment & Plan:  - Abdominal pain and vomiting, etiology unknown, continue supportive therapy as below. IVF, antiemetics  ---slowly improving, now with intermittent nausea, no vomiting, but poor appetite      - BRET, suspect this is pre-renal, sec to n/v, Cr baseline is 0.7-0.8.  Resolved after IVF.       - Ecoli UTI, s/p merrem, now on cefdinir - end date in place        - Metastatic breast CA , Dr Centeno following, continue letrozole, s/p zometa 7/12, plan to start ibrance once she returns home  -- palliative following for symptoms.  On ketamine gtt with plans to transition to PO at discharge.  Currently off, will defer to palliative on resuming/dose.     - Acute respiratory failure, suspect volume overload, echo normal ef, diastolic dysfunction, ct angio no pulm embolism, continue prn diuresis, O2 supp, hold on steroids  Improved      -  "Back pain with diffuse \"bone pain\", suspect this is sec to bony mets, palliative consulted and assisting. Palliative started on Ketamine infusion  for pain control.   Pt report a little improvement noted.      - Cards & heme-onc & palliative following (no ischemic evaluation, treat medically), suspected Afib per cards notes (data deficient)  - Continue bowel regimen (miralax bid, senokot bid, prn dulcolax) as constipation likely causing some discomfort  - steroid cream to rash low back     Dispo - home early to mid week once symptoms better controlled.     DVT Prophylaxis: [facility]      CODE STATUS:       Code Status and Medical Interventions:   Ordered at: 18 1308     Limited Support to NOT Include:     Vasopressors     Intubation     Cardioversion/Defibrillation     Code Status:     No CPR     Medical Interventions (Level of Support Prior to Arrest):     Limited         Disposition: anticipate d/c home once pain is reasonably controlled, ??TBD     Electronically signed by Shahriar Kellogg MD, 18, 3:48 PM.                   History & Physical      Ania De Leon DO at 2018 12:59 PM              [facility] Medicine Services  HISTORY AND PHYSICAL    Patient Name: Betsy Chi  : 1955  MRN: [id number]  Primary Care Physician: PRAFUL Wheatley    Subjective   Subjective     Chief Complaint:  soa and swelling     HPI:  Betsy Chi is a 62 y.o. female with a history of hypertension, dyslipidemia, diabetes, atrial fibrillation, COPD and left breast cancer  Admitted from Dr.Amy Mednoza's office today due to increased sob and work of breathing. Per pt and friend at bedsidel she has had increased cp as well as edema worsening since she was in the hospital in may 2018.  She states that her legs feel very tight and she becomes very sob when she lays flat.  She denies wheeze but states she has been coughing up clear phlegm.  She denies fever or chills. Admits to intermittent " "chest pain feels like stabbing through to her back. Non radiating and gets better. As well as nausea and vomiting.  Denies syncope.  She arrives to the room today anxious and tearful.  Says she just got her event monitor off last week but hasn't heard and states that she didn't do it right because she didn't understand directions.  She is not on any treatment yet for her breast cancer nor is she on any pain meds at home. She does have abd pain but she attributes it to her \"gallbladder\".     Review of Systems   Gen- No fevers, chills  CV- pos  chest pain, pos palpitations  Resp- pos cough, pos dyspnea  GI- No N/V/D,pos  abd pain    Otherwise 10-system ROS reviewed and is negative except as mentioned in the HPI.    Personal History     Past Medical History:   Diagnosis Date   • Arthritis    • Asthma    • Atrial fibrillation (CMS/HCC)    • Breast cancer (CMS/HCC)    • CHF (congestive heart failure) (CMS/HCC)    • Chronic kidney disease (CKD)    • COPD (chronic obstructive pulmonary disease) (CMS/HCC)    • CVA (cerebral vascular accident) (CMS/HCC)    • Diabetes mellitus, type 2 (CMS/HCC)    • GERD (gastroesophageal reflux disease)    • Hypercholesteremia    • Hypertension    • Myocardial infarction    • Osteoarthritis    • Psoriatic arthritis (CMS/HCC)    • Pulmonary embolism (CMS/HCC)    • Rheumatoid arthritis (CMS/HCC)    • Sciatica    • Smoker    • UTI (urinary tract infection)        Past Surgical History:   Procedure Laterality Date   • BLADDER SURGERY     • BREAST BIOPSY     • BREAST LUMPECTOMY     • BRONCHOSCOPY N/A 5/9/2018    Procedure: BRONCHOSCOPY WITH ENDOBRONCHIAL ULTRASOUND AND NAVIGATION WITH FLUORO;  Surgeon: Nixon Mulligan MD;  [facility];  Service: Pulmonary   • HYSTERECTOMY     • OOPHORECTOMY     • TUMOR REMOVAL      fallopian tube   • US GUIDED FINE NEEDLE ASPIRATION  5/3/2018       Family History: family history includes Aneurysm in her father and mother; Breast cancer in her " cousin; Cancer in her father and mother.     Social History:  reports that she has been smoking Cigarettes.  She has a 11.50 pack-year smoking history. She has never used smokeless tobacco. She reports that she does not drink alcohol or use drugs.  Social History     Social History Narrative    The patient lives with her niece in [place].    Caffeine: sometimes a pot of coffee and sometimes none.           Medications:  Prescriptions Prior to Admission   Medication Sig Dispense Refill Last Dose   • albuterol (PROVENTIL HFA;VENTOLIN HFA) 108 (90 Base) MCG/ACT inhaler Inhale 2 puffs Every 4 (Four) Hours As Needed for Wheezing. 1 inhaler 2 Taking   • aluminum acetate (DOMEBORO) pack packet Apply  topically 3 (Three) Times a Day As Needed (psoriatic arthritis). 100 each 6 Taking   • amLODIPine (NORVASC) 10 MG tablet TAKE ONE TABLET BY MOUTH DAILY 30 tablet 0    • atorvastatin (LIPITOR) 20 MG tablet Take 1 tablet by mouth Every Night. 90 tablet 0 Taking   • carvedilol (COREG) 12.5 MG tablet TAKE ONE TABLET BY MOUTH TWICE A DAY WITH MEALS 60 tablet 0    • Cranberry 600 MG tablet Take 1 tablet by mouth Daily.   Taking   • diphenhydrAMINE (BENADRYL) 25 MG tablet Take 25 mg by mouth 2 (Two) Times a Day As Needed.   Taking   • glucose blood test strip Use as instructed 100 each 12 Taking   • glucose monitor monitoring kit 1 each 3 (Three) Times a Day As Needed (hypo/hyperglycemia). 1 each 3 Taking   • Lancets (ACCU-CHEK SOFT TOUCH) lancets Three times daily and as needed 100 each 12    • letrozole (FEMARA) 2.5 MG tablet Take 1 tablet by mouth Daily. 30 tablet 11    • ondansetron (ZOFRAN) 8 MG tablet Take 1 tablet by mouth 3 (Three) Times a Day As Needed for Nausea or Vomiting. 30 tablet 5    • pantoprazole (PROTONIX) 40 MG EC tablet Take 1 tablet by mouth Daily. 90 tablet 2        Allergies   Allergen Reactions   • Ampicillin Anaphylaxis   • Erythromycin Anaphylaxis   • Morphine And Related Anaphylaxis   •  Oxycodone-Acetaminophen Anaphylaxis   • Oxycodone-Aspirin Anaphylaxis   • Penicillins Anaphylaxis   • Sulfa Antibiotics Swelling and Rash   • Aspirin GI Bleeding   • Codeine Swelling   • Contrast Dye Swelling   • Ibuprofen GI Bleeding   • Latex Arrhythmia   • Propoxyphene Nausea And Vomiting   • Saccharin Nausea And Vomiting   • Tramadol Swelling       Objective   Objective     Vital Signs:   Temp:  [98.5 °F (36.9 °C)] 98.5 °F (36.9 °C)  Heart Rate:  [] 100  Resp:  [24-36] 24  BP: (113-120)/(68-78) 120/78        Physical Exam   Constitutional: sitting up in chair, anxious, tearful, awake, alert  Eyes: PERRLA, sclerae anicteric, no conjunctival injection  HENT: NCAT, mucous membranes moist, poor dentition   Neck: Supple, no thyromegaly, no lymphadenopathy, trachea midline  Respiratory: Clear to auscultation bilaterally, tachypnea, no wheeze, dimished at bases   Cardiovascular: RRR, no murmurs, rubs, or gallops, palpable pedal pulses bilaterally  Gastrointestinal: Positive bowel sounds, soft, nontender, nondistended  Musculoskeletal: +2 bilateral ankle edema, mild clubbing or cyanosis to extremities  Psychiatric: Anxious, cooperative  Neurologic: Oriented x 3, strength symmetric in all extremities, Cranial Nerves grossly intact to confrontation, speech clear  Skin: No rashes      Results Reviewed:  I have personally reviewed current lab, radiology, and data and agree.      Results from last 7 days  Lab Units 07/11/18  1308   WBC 10*3/mm3 7.35   HEMOGLOBIN g/dL 14.1   HEMATOCRIT % 42.7   PLATELETS 10*3/mm3 230           Invalid input(s):  ALKPHOS, TROPONININT  CrCl cannot be calculated (Patient's most recent lab result is older than the maximum 30 days allowed.).  Brief Urine Lab Results  (Last result in the past 365 days)      Color   Clarity   Blood   Leuk Est   Nitrite   Protein   CREAT   Urine HCG        05/07/18 1101 Yellow Hazy(A) Negative 25 Vika/ul(A) Negative Negative             No results found for:  BNP  Imaging Results (last 24 hours)     Procedure Component Value Units Date/Time    XR Chest 1 View [[id number]] Updated:  07/11/18 1329        Results for orders placed during the hospital encounter of 02/05/16   SCANNED - ECHOCARDIOGRAM       Assessment/Plan   Assessment / Plan     Hospital Problem List     Acute on chronic hypoxemic respiratory failure not requiring ventilatory support    Mixed anxiety depressive disorder    Atrial fibrillation (CMS/HCC)    Hepatic cirrhosis (CMS/HCC)    Type 2 diabetes mellitus (CMS/HCC)    Dyslipidemia    Hypertension    Pulmonary nodule right lower lobe medially (2 x 1.5 cm).    Overview Signed 5/22/2017  1:30 PM by Janine Mckeon PA-C     Description: A.  CT scan of the chest February 11 thousand 16 reports a noncalcified 8.5-9 mm nodule in the medial portion of the right lower lobe.         Rheumatoid arthritis (CMS/HCC)    Invasive ductal carcinoma of breast, left (CMS/HCC)            Assessment & Plan:  Hypoxic resp failure likely secondary to fluid overload  --IV lasix   --check stat troponin  --Cards to see  --check ECHO  --restart low dose BB, she says was stopped on last admission but she's been taking amlodipine  --Last ECHO 7/15 pos for hypertrophic cardiomypathy, impaired relaxation, EF >65%.     CP  --concern for PE, Consult cardiology, Stat Troponin, EKG   --CTA stat chest, D DIMER  --check renal function, desi need CTA rule out PE    COPD  --Nebs, hold steroids for now    Invasive Ductal Carcinoma of Left breast, with widespread bony metastatic Dz, no brain mets on ct 4/18  --Dr. Mendoza following, was supposed to get a plan today to start treatment    Lung Nodule  --s/p Bx with Bronch, negative for mets, no tumor    A FIB per pt and chart  --no AC on admission  --Allg to ASA (GIB)    DM  --check A1C  --low dose ssi and accu checks qac and hs     RA  --no treatment now    GERD  --ppi     Anxiety  --ativan prn     Multiple Allergies     DVT  prophylaxis:  lovenox  CODE STATUS:  FULL, I have DW pt   Code Status and Medical Interventions:   Ordered at: 07/11/18 1254     Level Of Support Discussed With:    Patient     Code Status:    CPR     Medical Interventions (Level of Support Prior to Arrest):    Full       Admission Status:  I believe this patient meets INPATIENT status due to the need for care which can only be reasonably provided in an hospital setting such as aggressive/expedited ancillary services and/or consultation services, the necessity for IV medications, close physician monitoring and/or the possible need for procedures.  In such, I feel patient’s risk for adverse outcomes and need for care warrant INPATIENT evaluation and predict the patient’s care encounter to likely last beyond 2 midnights.        Electronically signed by Ania De Leon DO, 07/11/18, 1:00 PM.            Electronically signed by Ania De Leon DO at 7/11/2018  2:06 PM          Physician Progress Notes (most recent note)      Nadir Ojeda DO at 7/23/2018  1:48 PM          Palliative Care Progress Note    Date of Admission: 7/11/2018    Subjective:  Patient feels that her pain is somewhat better controlled today.  States that she has been up walking around quite a bit over the weekend.  Current Code Status     Date Active Code Status Order ID Comments User Context       7/11/2018 12:54 PM CPR [id number]  Ania De Leon DO Inpatient       Questions for Current Code Status     Question Answer Comment    Code Status CPR     Medical Interventions (Level of Support Prior to Arrest) Full     Level Of Support Discussed With Patient         No current facility-administered medications on file prior to encounter.      Current Outpatient Prescriptions on File Prior to Encounter   Medication Sig Dispense Refill   • albuterol (PROVENTIL HFA;VENTOLIN HFA) 108 (90 Base) MCG/ACT inhaler Inhale 2 puffs Every 4 (Four) Hours As Needed for Wheezing. 1 inhaler 2   • aluminum  "acetate (DOMEBORO) pack packet Apply  topically 3 (Three) Times a Day As Needed (psoriatic arthritis). 100 each 6   • amLODIPine (NORVASC) 10 MG tablet TAKE ONE TABLET BY MOUTH DAILY 30 tablet 0   • atorvastatin (LIPITOR) 20 MG tablet Take 1 tablet by mouth Every Night. 90 tablet 0   • carvedilol (COREG) 12.5 MG tablet TAKE ONE TABLET BY MOUTH TWICE A DAY WITH MEALS 60 tablet 0   • Cranberry 600 MG tablet Take 1 tablet by mouth Daily.     • glucose blood test strip Use as instructed 100 each 12   • glucose monitor monitoring kit 1 each 3 (Three) Times a Day As Needed (hypo/hyperglycemia). 1 each 3   • Lancets (ACCU-CHEK SOFT TOUCH) lancets Three times daily and as needed 100 each 12   • letrozole (FEMARA) 2.5 MG tablet Take 1 tablet by mouth Daily. 30 tablet 11   • ondansetron (ZOFRAN) 8 MG tablet Take 1 tablet by mouth 3 (Three) Times a Day As Needed for Nausea or Vomiting. 30 tablet 5   • pantoprazole (PROTONIX) 40 MG EC tablet Take 1 tablet by mouth Daily. 90 tablet 2       ketamine (KETALAR) infusion 7 mcg/kg/min Last Rate: Stopped (07/21/18 1220)     •  acetaminophen  •  aluminum acetate  •  bisacodyl  •  diphenhydrAMINE **OR** diphenhydrAMINE  •  ipratropium-albuterol  •  LORazepam  •  ondansetron  •  polyethylene glycol  •  sodium chloride    Objective: /69   Pulse 72   Temp 98.5 °F (36.9 °C) (Oral)   Resp 18   Ht 160 cm (62.99\")   Wt 82.1 kg (181 lb)   SpO2 96%   BMI 32.07 kg/m²       Intake/Output Summary (Last 24 hours) at 07/23/18 1348  Last data filed at 07/23/18 1245   Gross per 24 hour   Intake              560 ml   Output                0 ml   Net              560 ml     Physical Exam:      General Appearance:    Alert, cooperative,    Head:    Normocephalic, without obvious abnormality, atraumatic   Eyes:            Lids and lashes normal, conjunctivae and sclerae normal, no   icterus, no pallor, corneas clear, PERRLA   Ears:    Ears appear intact with no abnormalities noted   Throat:   " No oral lesions, no thrush, oral mucosa moist   Neck:   No adenopathy, supple, trachea midline, no thyromegaly, no     carotid bruit, no JVD   Back:     No kyphosis present, no scoliosis present, no skin lesions,       erythema or scars, no tenderness to percussion or                   palpation,   range of motion normal   Lungs:     Clear to auscultation,respirations regular, even and                   unlabored    Heart:    Regular rhythm and normal rate, normal S1 and S2, no            murmur, no gallop, no rub, no click   Breast Exam:    Deferred   Abdomen:     Normal bowel sounds, no masses, no organomegaly, soft        non-tender, non-distended, no guarding, no rebound                 tenderness   Genitalia:    Deferred   Extremities:   Swelling noted over base of left thumb.  No tenderness in snuff box noted.   Pulses:   Pulses palpable and equal bilaterally   Skin:   No bleeding, bruising or rash   Lymph nodes:   No palpable adenopathy   Neurologic:   Cranial nerves 2 - 12 grossly intact, sensation intact, DTR        present and equal bilaterally       Results from last 7 days  Lab Units 07/20/18  0650   WBC 10*3/mm3 5.82   HEMOGLOBIN g/dL 12.3   HEMATOCRIT % 37.8   PLATELETS 10*3/mm3 208       Results from last 7 days  Lab Units 07/20/18  0650   SODIUM mmol/L 139   POTASSIUM mmol/L 4.0   CHLORIDE mmol/L 108   CO2 mmol/L 25.0   BUN mg/dL 11   CREATININE mg/dL 0.62   CALCIUM mg/dL 8.5*   GLUCOSE mg/dL 106*       Impression: Breast cancer with bone metastasis  Dysphagia  Dyspnea  Goals of care  Plan:  Hospice case manager to talk to patient today.  I will continue to current dose of ketamine and with patient decides whether not she wants hospice we will look at what route is viable.        Nadir Ojeda DO  07/23/18  1:48 PM        Electronically signed by Nadir Ojeda DO at 7/23/2018  1:49 PM       Shahriar Kellogg MD Physician Signed Hospitalists  Progress Notes Date of Service: 7/21/2018  3:48 PM       Expand All Collapse All    []Manual[]Template  []Copied       [facility] Medicine Services  PROGRESS NOTE     Patient Name: Betsy Chi  : 1955  MRN: [id number]     Date of Admission: 2018  Length of Stay: 10  Primary Care Physician: PRAFUL Wheatley        Subjective      Subjective      CC: f/u n/v and UTI     HPI:  Didn't feel well this morning.  [place] like ketamine gtt was too high, + double vision.  Currently turned off and she is feeling better.  No other complaints.     Review of Systems  Gen- No fevers, chills  CV- No chest pain, palpitations  Resp- No cough, dyspnea  GI-  No V/D, abd pain, +nausea intermittently     Otherwise ROS is negative except as mentioned in the HPI.        Objective      Objective      Vital Signs:   Temp:  [98.1 °F (36.7 °C)-98.6 °F (37 °C)] 98.5 °F (36.9 °C)  Heart Rate:  [69-90] 78  Resp:  [18] 18  BP: (109-129)/(61-67) 118/66     Physical Exam:  Constitutional: No acute distress, awake, alert, chronically ill appearing  HENT: NCAT, mucous membranes moist  Respiratory: Clear to auscultation bilaterally, respiratory effort normal   Cardiovascular: RRR, no murmurs, rubs, or gallops,  Gastrointestinal: Positive bowel sounds, soft, nontender, nondistended  Musculoskeletal: No bilateral ankle edema  Psychiatric: Appropriate affect, cooperative  Neurologic: Oriented x 3, no focal deficits  Skin: + psoriasis patches        Results Reviewed:  I have personally reviewed current lab, radiology, and data and agree.        Results from last 7 days  Lab Units 18  0650   WBC 10*3/mm3 5.82   HEMOGLOBIN g/dL 12.3   HEMATOCRIT % 37.8   PLATELETS 10*3/mm3 208        Results from last 7 days  Lab Units 18  0650   SODIUM mmol/L 139   POTASSIUM mmol/L 4.0   CHLORIDE mmol/L 108   CO2 mmol/L 25.0   BUN mg/dL 11   CREATININE mg/dL 0.62   GLUCOSE mg/dL 106*   CALCIUM mg/dL 8.5*     Estimated Creatinine Clearance: 93.1 mL/min (by C-G formula based on  SCr of 0.62 mg/dL).  No results found for: BNP             Microbiology Results Abnormal      Procedure Component Value - Date/Time     Urine Culture - Urine, [[id number]]  (Abnormal)  (Susceptibility) Collected:  07/13/18 1636     Lab Status:  Final result Specimen:  Urine from Urine, Clean Catch Updated:  07/15/18 1032       Urine Culture >100,000 CFU/mL Escherichia coli (A)             Susceptibility                Escherichia coli        CHAMP        Ampicillin <=8 ug/ml Susceptible        Ampicillin + Sulbactam <=8/4 ug/ml Susceptible        Aztreonam <=8 ug/ml Susceptible        Cefepime <=8 ug/ml Susceptible        Cefotaxime <=2 ug/ml Susceptible        Ceftriaxone <=8 ug/ml Susceptible        Cefuroxime sodium <=4 ug/ml Susceptible        Cephalothin 16 ug/ml Intermediate        Ertapenem <=1 ug/ml Susceptible        Gentamicin <=4 ug/ml Susceptible        Levofloxacin <=2 ug/ml Susceptible        Meropenem <=1 ug/ml Susceptible        Nitrofurantoin <=32 ug/ml Susceptible        Piperacillin + Tazobactam <=16 ug/ml Susceptible        Tetracycline <=4 ug/ml Susceptible        Tobramycin <=4 ug/ml Susceptible        Trimethoprim + Sulfamethoxazole <=2/38 ug/ml Susceptible                                       Results for orders placed during the hospital encounter of 07/11/18   Adult Transthoracic Echo Complete W/ Cont if Necessary Per Protocol     Narrative · Left ventricular systolic function is normal. Estimated EF = 65%.  · Left ventricular wall thickness is consistent with mild concentric   hypertrophy.  · Left ventricular diastolic dysfunction (grade II) consistent with   pseudonormalization.  · Estimated right ventricular systolic pressure from tricuspid   regurgitation is normal (<35 mmHg).            I have reviewed the medications.        Assessment/Plan      Assessment / Plan          Hospital Problem List      * (Principal)Acute on chronic hypoxemic respiratory failure not requiring ventilatory  support     Mixed anxiety depressive disorder     Atrial fibrillation (CMS/HCC)     COPD (chronic obstructive pulmonary disease) (CMS/HCC)     Type 2 diabetes mellitus (CMS/HCC)     Dyslipidemia     Hypertension     Pulmonary nodule right lower lobe medially (2 x 1.5 cm).     Overview Signed 5/22/2017  1:30 PM by Janine Mckeon PA-C       Description: A.  CT scan of the chest February 11 thousand 16 reports a noncalcified 8.5-9 mm nodule in the medial portion of the right lower lobe.           Rheumatoid arthritis (CMS/HCC)     Invasive ductal carcinoma of breast, left (CMS/HCC)     Chronic hypoxemic respiratory failure (CMS/HCC)     Overview Signed 7/12/2018  2:01 PM by Blade Taylor MD       2Lnc chronically           Dyspnea     Atypical chest pain     Acute on chronic diastolic CHF (congestive heart failure) (CMS/HCC)     Constipation          Brief Hospital Course to date:  Betsy Chi is a 62 y.o. female w/ metastatic breast cancer, copd, chronic 2Lnc use, ? Hx afib, dm2 ra presented as direct admisison from dr. centeno office for multiple complaints including diffuse pain, dyspnea.      Assessment & Plan:  - Abdominal pain and vomiting, etiology unknown, continue supportive therapy as below. IVF, antiemetics  ---slowly improving, now with intermittent nausea, no vomiting, but poor appetite      - RBET, suspect this is pre-renal, sec to n/v, Cr baseline is 0.7-0.8.  Resolved after IVF.       - Ecoli UTI, s/p merrem, now on cefdinir - end date in place        - Metastatic breast CA , Dr Centeno following, continue letrozole, s/p zometa 7/12, plan to start ibrance once she returns home  -- palliative following for symptoms.  On ketamine gtt with plans to transition to PO at discharge.  Currently off, will defer to palliative on resuming/dose.     - Acute respiratory failure, suspect volume overload, echo normal ef, diastolic dysfunction, ct angio no pulm embolism, continue prn diuresis, O2 supp, hold on  "steroids  Improved      - Back pain with diffuse \"bone pain\", suspect this is sec to bony mets, palliative consulted and assisting. Palliative started on Ketamine infusion  for pain control.   Pt report a little improvement noted.      - Cards & heme-onc & palliative following (no ischemic evaluation, treat medically), suspected Afib per cards notes (data deficient)  - Continue bowel regimen (miralax bid, senokot bid, prn dulcolax) as constipation likely causing some discomfort  - steroid cream to rash low back     Dispo - home early to mid week once symptoms better controlled.     DVT Prophylaxis: [facility]      CODE STATUS:   Code Status and Medical Interventions:   Ordered at: 18 1308     Limited Support to NOT Include:     Vasopressors     Intubation     Cardioversion/Defibrillation     Code Status:     No CPR     Medical Interventions (Level of Support Prior to Arrest):     Limited         Disposition: anticipate d/c home once pain is reasonably controlled, ??TBD     Electronically signed by Shahriar Kellogg MD, 18, 3:48 PM.                [address] [address]  Dept. Phone:  [phone]  Dept. Fax:   Date Ordered: 2018         Patient:  Betsy Chi MRN:  [id number]   [address] [facility] [address] :  1955  SSN:    Phone: [phone] Sex:  F     Weight: 82.1 kg (181 lb)         Ht Readings from Last 1 Encounters:   18 160 cm (62.99\")         Standard Wheelchair              (Order ID: [id number])    Diagnosis:  Impaired functional mobility, balance, gait, and endurance (Z74.09 [ICD-10-CM] V49.89 [ICD-9-CM])  Bone metastases (CMS/HCC) (C79.51 [ICD-10-CM] 198.5 [ICD-9-CM])   Quantity:  1     Equipment:  Standard Wheelchair  Wheelchair accessories:  Manual W/C Seat Widths 20-23 inches  The face to face evaluation was performed on: 2018  Length of Need (99 Months = Lifetime): 99 Months = " Lifetime        Authorizing Provider's Phone: [phone]         Verbal Order Mode: Per protocol: cosign required  Authorizing Provider: Shahriar Kellogg MD  Authorizing Provider's NPI: [id number]     Order Entered By: Isatu Murcia RN 7/23/2018  3:45 PM     Electronically signed by:  Shahriar Kellogg MD  7/23/2018  3:50 PM

## 2018-07-24 ENCOUNTER — TELEPHONE (OUTPATIENT)
Dept: ONCOLOGY | Facility: CLINIC | Age: 63
End: 2018-07-24

## 2018-07-24 VITALS
WEIGHT: 181 LBS | RESPIRATION RATE: 18 BRPM | BODY MASS INDEX: 32.07 KG/M2 | DIASTOLIC BLOOD PRESSURE: 90 MMHG | OXYGEN SATURATION: 96 % | HEART RATE: 77 BPM | HEIGHT: 63 IN | TEMPERATURE: 98.5 F | SYSTOLIC BLOOD PRESSURE: 132 MMHG

## 2018-07-24 DIAGNOSIS — I10 ESSENTIAL HYPERTENSION: ICD-10-CM

## 2018-07-24 PROBLEM — I50.33 ACUTE ON CHRONIC DIASTOLIC CHF (CONGESTIVE HEART FAILURE) (HCC): Status: RESOLVED | Noted: 2018-07-12 | Resolved: 2018-07-24

## 2018-07-24 PROBLEM — J96.91 RESPIRATORY FAILURE WITH HYPOXIA (HCC): Status: RESOLVED | Noted: 2018-04-23 | Resolved: 2018-07-24

## 2018-07-24 PROCEDURE — 99239 HOSP IP/OBS DSCHRG MGMT >30: CPT | Performed by: NURSE PRACTITIONER

## 2018-07-24 PROCEDURE — 25010000002 HEPARIN (PORCINE) PER 1000 UNITS: Performed by: FAMILY MEDICINE

## 2018-07-24 PROCEDURE — 63710000001 DRONABINOL PER 2.5 MG: Performed by: FAMILY MEDICINE

## 2018-07-24 PROCEDURE — 97116 GAIT TRAINING THERAPY: CPT

## 2018-07-24 RX ORDER — CARVEDILOL 3.12 MG/1
3.12 TABLET ORAL 2 TIMES DAILY WITH MEALS
Qty: 60 TABLET | Refills: 0 | Status: SHIPPED | OUTPATIENT
Start: 2018-07-24

## 2018-07-24 RX ORDER — ACETAMINOPHEN 325 MG/1
650 TABLET ORAL EVERY 6 HOURS PRN
Start: 2018-07-24

## 2018-07-24 RX ORDER — CARVEDILOL 12.5 MG/1
TABLET ORAL
Qty: 60 TABLET | Refills: 0 | OUTPATIENT
Start: 2018-07-24 | End: 2018-07-24 | Stop reason: HOSPADM

## 2018-07-24 RX ORDER — KETAMINE HCL 100 %
POWDER (GRAM) MISCELLANEOUS
Start: 2018-07-24

## 2018-07-24 RX ORDER — LORAZEPAM 0.5 MG/1
0.5 TABLET ORAL EVERY 6 HOURS PRN
Start: 2018-07-24

## 2018-07-24 RX ORDER — LORAZEPAM 0.5 MG/1
0.5 TABLET ORAL EVERY 8 HOURS SCHEDULED
Refills: 0
Start: 2018-07-24

## 2018-07-24 RX ORDER — IPRATROPIUM BROMIDE AND ALBUTEROL SULFATE 2.5; .5 MG/3ML; MG/3ML
3 SOLUTION RESPIRATORY (INHALATION) EVERY 4 HOURS PRN
Qty: 360 ML | Refills: 0 | Status: SHIPPED | OUTPATIENT
Start: 2018-07-24

## 2018-07-24 RX ORDER — AMLODIPINE BESYLATE 10 MG/1
TABLET ORAL
Qty: 30 TABLET | Refills: 0 | OUTPATIENT
Start: 2018-07-24 | End: 2018-07-24 | Stop reason: HOSPADM

## 2018-07-24 RX ADMIN — LORAZEPAM 0.5 MG: 0.5 TABLET ORAL at 05:45

## 2018-07-24 RX ADMIN — DRONABINOL 5 MG: 2.5 CAPSULE ORAL at 09:02

## 2018-07-24 RX ADMIN — PANTOPRAZOLE SODIUM 40 MG: 40 TABLET, DELAYED RELEASE ORAL at 05:45

## 2018-07-24 RX ADMIN — Medication 10 MG: at 05:45

## 2018-07-24 RX ADMIN — Medication 10 MG: at 13:47

## 2018-07-24 RX ADMIN — LETROZOLE 2.5 MG: 2.5 TABLET, FILM COATED ORAL at 09:02

## 2018-07-24 RX ADMIN — LORAZEPAM 0.5 MG: 0.5 TABLET ORAL at 13:46

## 2018-07-24 RX ADMIN — CARVEDILOL 3.12 MG: 3.12 TABLET, FILM COATED ORAL at 09:02

## 2018-07-24 RX ADMIN — HEPARIN SODIUM 5000 UNITS: 5000 INJECTION, SOLUTION INTRAVENOUS; SUBCUTANEOUS at 05:45

## 2018-07-24 NOTE — PROGRESS NOTES
Palliative Care Progress Note    Date of Admission: 7/11/2018    Subjective:  Patient feels that her pain is somewhat better controlled today.  States that she has been up walking around quite a bit over the weekend.  Current Code Status     Date Active Code Status Order ID Comments User Context       7/11/2018 12:54 PM CPR [id number]  Ania De Leon, DO Inpatient       Questions for Current Code Status     Question Answer Comment    Code Status CPR     Medical Interventions (Level of Support Prior to Arrest) Full     Level Of Support Discussed With Patient         No current facility-administered medications on file prior to encounter.      Current Outpatient Prescriptions on File Prior to Encounter   Medication Sig Dispense Refill   • albuterol (PROVENTIL HFA;VENTOLIN HFA) 108 (90 Base) MCG/ACT inhaler Inhale 2 puffs Every 4 (Four) Hours As Needed for Wheezing. 1 inhaler 2   • aluminum acetate (DOMEBORO) pack packet Apply  topically 3 (Three) Times a Day As Needed (psoriatic arthritis). 100 each 6   • atorvastatin (LIPITOR) 20 MG tablet Take 1 tablet by mouth Every Night. 90 tablet 0   • Cranberry 600 MG tablet Take 1 tablet by mouth Daily.     • glucose blood test strip Use as instructed 100 each 12   • glucose monitor monitoring kit 1 each 3 (Three) Times a Day As Needed (hypo/hyperglycemia). 1 each 3   • Lancets (ACCU-CHEK SOFT TOUCH) lancets Three times daily and as needed 100 each 12   • letrozole (FEMARA) 2.5 MG tablet Take 1 tablet by mouth Daily. 30 tablet 11   • ondansetron (ZOFRAN) 8 MG tablet Take 1 tablet by mouth 3 (Three) Times a Day As Needed for Nausea or Vomiting. 30 tablet 5   • pantoprazole (PROTONIX) 40 MG EC tablet Take 1 tablet by mouth Daily. 90 tablet 2   • [DISCONTINUED] amLODIPine (NORVASC) 10 MG tablet TAKE ONE TABLET BY MOUTH DAILY 30 tablet 0   • [DISCONTINUED] carvedilol (COREG) 12.5 MG tablet TAKE ONE TABLET BY MOUTH TWICE A DAY WITH MEALS 60 tablet 0       ketamine (KETALAR)  "infusion 7 mcg/kg/min Last Rate: Stopped (07/21/18 1220)     •  acetaminophen  •  aluminum acetate  •  bisacodyl  •  diphenhydrAMINE **OR** diphenhydrAMINE  •  ipratropium-albuterol  •  LORazepam  •  ondansetron  •  polyethylene glycol  •  sodium chloride    Objective: /74   Pulse 74   Temp 98.2 °F (36.8 °C) (Oral)   Resp 16   Ht 160 cm (62.99\")   Wt 82.1 kg (181 lb)   SpO2 96%   BMI 32.07 kg/m²      Intake/Output Summary (Last 24 hours) at 07/24/18 1240  Last data filed at 07/24/18 0900   Gross per 24 hour   Intake              920 ml   Output                0 ml   Net              920 ml     Physical Exam:      General Appearance:    Alert, cooperative,    Head:    Normocephalic, without obvious abnormality, atraumatic   Eyes:            Lids and lashes normal, conjunctivae and sclerae normal, no   icterus, no pallor, corneas clear, PERRLA   Ears:    Ears appear intact with no abnormalities noted   Throat:   No oral lesions, no thrush, oral mucosa moist   Neck:   No adenopathy, supple, trachea midline, no thyromegaly, no     carotid bruit, no JVD   Back:     No kyphosis present, no scoliosis present, no skin lesions,       erythema or scars, no tenderness to percussion or                   palpation,   range of motion normal   Lungs:     Clear to auscultation,respirations regular, even and                   unlabored    Heart:    Regular rhythm and normal rate, normal S1 and S2, no            murmur, no gallop, no rub, no click   Breast Exam:    Deferred   Abdomen:     Normal bowel sounds, no masses, no organomegaly, soft        non-tender, non-distended, no guarding, no rebound                 tenderness   Genitalia:    Deferred   Extremities:   Swelling noted over base of left thumb.  No tenderness in snuff box noted.   Pulses:   Pulses palpable and equal bilaterally   Skin:   No bleeding, bruising or rash   Lymph nodes:   No palpable adenopathy   Neurologic:   Cranial nerves 2 - 12 grossly intact, " sensation intact, DTR        present and equal bilaterally       Results from last 7 days  Lab Units 07/20/18  0650   WBC 10*3/mm3 5.82   HEMOGLOBIN g/dL 12.3   HEMATOCRIT % 37.8   PLATELETS 10*3/mm3 208       Results from last 7 days  Lab Units 07/20/18  0650   SODIUM mmol/L 139   POTASSIUM mmol/L 4.0   CHLORIDE mmol/L 108   CO2 mmol/L 25.0   BUN mg/dL 11   CREATININE mg/dL 0.62   CALCIUM mg/dL 8.5*   GLUCOSE mg/dL 106*       Impression: Breast cancer with bone metastasis  Dysphagia  Dyspnea  Goals of care  Plan:  Home with hospice today.  Will continiue Ketamine orally.        Nadir Ojeda DO  07/24/18  12:40 PM

## 2018-07-24 NOTE — TELEPHONE ENCOUNTER
----- Message from Candace Ramos MA sent at 7/24/2018  1:16 PM EDT -----  Regarding: SHARLENE- HOSPICE CASE MANAGER  Contact: [phone]  [facility] is calling with info regarding pt's discharge today from the hospital.Please call to discuss in detail.

## 2018-07-24 NOTE — PLAN OF CARE
Problem: Patient Care Overview  Goal: Plan of Care Review  Outcome: Ongoing (interventions implemented as appropriate)   07/24/18 0325   Coping/Psychosocial   Plan of Care Reviewed With patient   Plan of Care Review   Progress improving   OTHER   Outcome Summary Pt rested well overnight with no acute overnight events. VSS. Continue current POC, possible DC home soon       Problem: Skin Injury Risk (Adult)  Goal: Identify Related Risk Factors and Signs and Symptoms  Outcome: Ongoing (interventions implemented as appropriate)    Goal: Skin Health and Integrity  Outcome: Ongoing (interventions implemented as appropriate)      Problem: Fall Risk (Adult)  Goal: Identify Related Risk Factors and Signs and Symptoms  Outcome: Ongoing (interventions implemented as appropriate)    Goal: Absence of Fall  Outcome: Ongoing (interventions implemented as appropriate)      Problem: Palliative Care (Adult)  Goal: Identify Related Risk Factors and Signs and Symptoms  Outcome: Ongoing (interventions implemented as appropriate)    Goal: Maximized Comfort  Outcome: Ongoing (interventions implemented as appropriate)    Goal: Enhanced Quality of Life  Outcome: Ongoing (interventions implemented as appropriate)

## 2018-07-24 NOTE — DISCHARGE SUMMARY
[facility] Medicine Services  DISCHARGE SUMMARY    Patient Name: Betsy Chi  : 1955  MRN: [id number]    Date of Admission: 2018  Date of Discharge:  2018  Primary Care Physician: PRAFUL Wheatley    Consults     Date and Time Order Name Status Description    2018 1518 Inpatient Palliative Care MD Consult Completed     2018 1352 Inpatient Cardiology Consult      2018 1254 Hematology & Oncology Inpatient Consult Completed         Hospital Course     Presenting Problem:   Respiratory failure with hypoxia (CMS/HCC) [J96.91]    Active Hospital Problems    Diagnosis Date Noted   • Chronic hypoxemic respiratory failure (CMS/HCC) [J96.11] 2018   • Dyspnea [R06.00] 2018   • Atypical chest pain [R07.89] 2018   • Constipation [K59.00] 2018   • Invasive ductal carcinoma of breast, left (CMS/HCC) [C50.912] 2018   • Mixed anxiety depressive disorder [F41.8] 2017   • Atrial fibrillation (CMS/HCC) [I48.91] 2017   • Type 2 diabetes mellitus (CMS/HCC) [E11.9] 2017   • Dyslipidemia [E78.5] 2017   • Hypertension [I10] 2017   • Rheumatoid arthritis (CMS/HCC) [M06.9] 2017   • Pulmonary nodule right lower lobe medially (2 x 1.5 cm). [R91.8] 2017   • COPD (chronic obstructive pulmonary disease) (CMS/HCC) [J44.9] 2017      Resolved Hospital Problems    Diagnosis Date Noted Date Resolved   • **Acute on chronic hypoxemic respiratory failure not requiring ventilatory support [J96.91] 2018   • Acute on chronic diastolic CHF (congestive heart failure) (CMS/HCC) [I50.33] 2018          Hospital Course:  Betsy Chi is a 62 y.o. female w/ metastatic breast cancer, copd, chronic 2Lnc use, ? Hx afib, dm2 ra presented as direct admisison from dr. centeno office for multiple complaints including diffuse pain, dyspnea.  She was admitted to hospital medicine service.  Found  to have a Escherichia coli UTI and is now A/P full antibiotic course with symptoms resolved.  AK I present on admit resolved after IV fluid hydration felt to be prerenal in nature.  Had abdominal pain with nausea vomiting.  Slowly improved and appetite slowly increased.  Still with intermittent nausea.  Also acute respiratory failure which was felt suspect due to volume overload with a normal EF per echo and mild diastolic dysfunction.  She did have CT angiogram of the chest showed no PE.  Diuresed and supplemental oxygen.  Nebs.  Also issues with irregular heartbeat/A. fib followed by cardiology.  Lactic for medical management.    Hematology/oncology and radiation oncology followed.  Had been on femera prior to admission on hold during stay.  T need to have back pain and diffuse bony pain secondary to her bony metastases.  Pain was uncontrolled.  Palliative medicine was consulted.  Assisted with pain management.  Difficult to manage due to numerous medication allergies with anaphylaxis.  Ultimately responded fairly well to ketamine.    She seen resting upright in bed in no acute distress.  No visitors at bedside.  She reports that her pain is stable at a 5/10 scale which is manageable for her.  She is tolerating oral ketamine with no difficulty.  Is that she is ready for discharge home.  Palliative medicine following.  Patient spoke with hospice care liaison.  Has now decided to discharge home with hospice to follow.  For this reason her Femera is continued at time of discharge by hospice recommendations.  All oxygen, DME to be arranged per hospice and delivered patient's home.  Ketamine and Ativan to be supplied per hospice and delivered to bedside prior to discharge.  We'll also prescribed duo nebs per patient's request with machine delivered per hospice.  Discharge home today with hospice to follow.  Follow up PCP as noted.  Follow-up with oncology per their recommendations.      Discharge Follow Up  "Recommendations for labs/diagnostics:  Per hospice care team, patient is to discharge home off of her Femara due to home with hospice care.  This team states patient is aware and agrees.*        Day of Discharge     HPI:   Pt is seen at 0950 am resting upright in bed in no acute distress.  No visitors at bedside.  She is awake, alert and oriented.  Actually smiling some this morning.  States that she feels a little better and rates her pain 5/10 scale which is stable and \"good for her\".  Complaining still of just mild trace lower extremity edema much improved.  Feels that her anxiety and depression are better today.  Tolerating oral ketamine with no known side effects.  Hopeful to discharge home today pending palliative final recommendations.    Review of Systems  Gen- No fevers, chills  CV- No chest pain, palpitations  Resp- No cough, dyspnea  GI-  No V/D, abd pain, +nausea intermittently     Otherwise ROS is negative except as mentioned in the HPI.    Vital Signs:   Temp:  [98.2 °F (36.8 °C)-99.2 °F (37.3 °C)] 98.2 °F (36.8 °C)  Heart Rate:  [72-75] 74  Resp:  [16-18] 16  BP: ()/(65-83) 144/74     Physical Exam:  Constitutional: No acute distress, awake, alert, chronically ill appearing.  Seen sitting upright in bed.  No visitors at bedside.  HENT: NCAT, mucous membranes moist  Respiratory: Clear to auscultation bilaterally A/P, respiratory effort normal On RA currently.  Cardiovascular: RRR, no murmurs, rubs, or gallops  Gastrointestinal: Positive bowel sounds, soft, nontender, nondistended.  Obese abd   Musculoskeletal: No bilateral ankle edema.  ESPARZA spont  Psychiatric: Appropriate slightly depressed affect, cooperative.  Labile emotions improved today.   calm and conversant.  Neurologic: Oriented x 3, no focal deficits.  Speech clear and appropriate. Follows commands  Skin: + psoriasis patches noted to palms of hands.     Pertinent  and/or Most Recent Results       Results from last 7 days  Lab Units " 07/20/18  0650   WBC 10*3/mm3 5.82   HEMOGLOBIN g/dL 12.3   HEMATOCRIT % 37.8   PLATELETS 10*3/mm3 208   SODIUM mmol/L 139   POTASSIUM mmol/L 4.0   CHLORIDE mmol/L 108   CO2 mmol/L 25.0   BUN mg/dL 11   CREATININE mg/dL 0.62   GLUCOSE mg/dL 106*   CALCIUM mg/dL 8.5*           Invalid input(s): PROT, LABALBU        Invalid input(s): TG, LDLCALC, LDLREALC      Brief Urine Lab Results  (Last result in the past 365 days)      Color   Clarity   Blood   Leuk Est   Nitrite   Protein   CREAT   Urine HCG        07/13/18 1636 Yellow Cloudy(A) Trace(A) Large (3+)(A) Negative Negative               Microbiology Results Abnormal     Procedure Component Value - Date/Time    Urine Culture - Urine, [[id number]]  (Abnormal)  (Susceptibility) Collected:  07/13/18 1636    Lab Status:  Final result Specimen:  Urine from Urine, Clean Catch Updated:  07/15/18 1032     Urine Culture >100,000 CFU/mL Escherichia coli (A)    Susceptibility      Escherichia coli     CHAMP     Ampicillin <=8 ug/ml Susceptible     Ampicillin + Sulbactam <=8/4 ug/ml Susceptible     Aztreonam <=8 ug/ml Susceptible     Cefepime <=8 ug/ml Susceptible     Cefotaxime <=2 ug/ml Susceptible     Ceftriaxone <=8 ug/ml Susceptible     Cefuroxime sodium <=4 ug/ml Susceptible     Cephalothin 16 ug/ml Intermediate     Ertapenem <=1 ug/ml Susceptible     Gentamicin <=4 ug/ml Susceptible     Levofloxacin <=2 ug/ml Susceptible     Meropenem <=1 ug/ml Susceptible     Nitrofurantoin <=32 ug/ml Susceptible     Piperacillin + Tazobactam <=16 ug/ml Susceptible     Tetracycline <=4 ug/ml Susceptible     Tobramycin <=4 ug/ml Susceptible     Trimethoprim + Sulfamethoxazole <=2/38 ug/ml Susceptible                          Imaging Results (all)     Procedure Component Value Units Date/Time    XR Hand 3+ View Left [[id number]] Collected:  07/19/18 1134     Updated:  07/20/18 1336    Narrative:       EXAMINATION: XR HAND 3+ VW, LEFT-07/19/2018:      INDICATION: Thumb pain; R13.10-Dysphagia,  unspecified.      COMPARISON: NONE.     FINDINGS: Three views of the left hand reveal minimal degenerative  changes seen of the first carpometacarpal joint. The interphalangeal  joint of the first digit is intact and unremarkable. There is no  evidence of acute bony abnormality. The cortex is intact. Joint spaces  are preserved. Osteopenia identified of the bony structures.           Impression:       Degenerative changes seen within the first carpometacarpal  joint and interphalangeal joint of the first digit. No acute bony  abnormality.     D:  07/19/2018  E:  07/19/2018     This report was finalized on 7/20/2018 1:34 PM by Dr. Valentine Dover MD.       Fiberoptic Endo (fees) [[id number]] Resulted:  07/13/18 1214     Updated:  07/13/18 1214    Narrative:       This procedure was auto-finalized with no dictation required.    CT Angiogram Chest With & Without Contrast [[id number]] Collected:  07/11/18 2026     Updated:  07/12/18 0840    Narrative:       EXAMINATION: CT ANGIOGRAM CHEST W/WO CONTRAST - 7/11/2018     INDICATION: Chest pain, shortness of air.     TECHNIQUE: Multiple axial CT imaging is obtained of the chest following  the administration of intravenous contrast according to the CT angiogram  protocol. 2D coronal reformatted images were submitted to further  facilitate diagnostic accuracy and treatment planning.      The radiation dose reduction device was turned on for each scan per the  ALARA (As Low as Reasonably Achievable) protocol.     COMPARISON: 4/23/2018.     FINDINGS: There is a pulmonary nodule seen within the right lower lobe  which is stable in size and appearance when compared to the prior study.  There is adenopathy identified within the right hilar region as well as  throughout the mediastinum which is stable and unchanged. The remainder  of the lung parenchyma reveals mild chronic and emphysematous change.  There is some calcification seen in the mediastinal and left hilar lymph  nodes  suggesting old healed granulomatous disease. There are atelectatic  changes identified at the lung bases bilaterally. Cardiac chambers are  within normal limits. There is no filling defect in the pulmonary  arteries to suggest evidence of pulmonary embolism. The lymph nodes in  the left axillary region are stable. No mediastinal mass. Vascular  calcification is seen within the thoracic aorta. Degenerative change is  seen within the spine. There are areas of sclerosis within the spine  concerning for bony metastatic disease. The visualized upper abdomen is  grossly unremarkable.       Impression:       Stable appearance of the pulmonary nodule as well as stable  hilar adenopathy on the right and mediastinal adenopathy. There is  stable bony metastatic disease throughout the bony skeleton. There is  also stable appearance of the left axillary region. No new focal  parenchymal opacification. No evidence of pulmonary embolism.      DICTATED:   711/2018  EDITED/ls :   7/11/2018      This report was finalized on 7/12/2018 8:38 AM by Dr. Valentine Dover MD.       XR Chest 1 View [[id number]] Collected:  07/11/18 1425     Updated:  07/11/18 1455    Narrative:       EXAMINATION: XR CHEST 1 VW-07/11/2018:      INDICATION: SOA.      COMPARISON: 04/24/2018.     FINDINGS: Portable chest reveals cardiac and mediastinal silhouettes to  be within normal limits. Underlying chronic and emphysematous changes  seen within the lung fields bilaterally. Calcified granuloma identified  at the left lung base.  No pleural effusion  or pneumothorax. Pulmonary  vascularity is within normal limits.           Impression:       No acute cardiopulmonary disease. Stable chronic changes  seen within the lung fields bilaterally.     D:  07/11/2018  E:  07/11/2018     This report was finalized on 7/11/2018 2:53 PM by Dr. Valentine Dover MD.                       Results for orders placed during the hospital encounter of 07/11/18   Adult  Transthoracic Echo Complete W/ Cont if Necessary Per Protocol    Narrative · Left ventricular systolic function is normal. Estimated EF = 65%.  · Left ventricular wall thickness is consistent with mild concentric   hypertrophy.  · Left ventricular diastolic dysfunction (grade II) consistent with   pseudonormalization.  · Estimated right ventricular systolic pressure from tricuspid   regurgitation is normal (<35 mmHg).            Discharge Details        Discharge Medications      New Medications      Instructions Start Date   acetaminophen 325 MG tablet  Commonly known as:  TYLENOL   650 mg, Oral, Every 6 Hours PRN      carvedilol 3.125 MG tablet  Commonly known as:  COREG   3.125 mg, Oral, 2 Times Daily With Meals      ipratropium-albuterol 0.5-2.5 mg/3 ml nebulizer  Commonly known as:  DUO-NEB   3 mL, Nebulization, Every 4 Hours PRN      Ketamine HCl powder   Ketamine 10 mg po every 8 hours (per palliative rx)      LORazepam 0.5 MG tablet  Commonly known as:  ATIVAN   0.5 mg, Oral, Every 6 Hours PRN, Per Palliative rx      LORazepam 0.5 MG tablet  Commonly known as:  ATIVAN   0.5 mg, Oral, Every 8 Hours Scheduled, Per palliative rx      polyethylene glycol pack packet  Commonly known as:  MIRALAX   17 g, Oral, 2 Times Daily PRN         Continue These Medications      Instructions Start Date   accu-chek soft touch lancets   Three times daily and as needed      aluminum acetate pack packet   Topical, 3 Times Daily PRN      atorvastatin 20 MG tablet  Commonly known as:  LIPITOR   20 mg, Oral, Nightly      diphenhydrAMINE 25 mg capsule  Commonly known as:  BENADRYL   25 mg, Oral, 2 Times Daily PRN      glucose blood test strip   Use as instructed      glucose monitor monitoring kit   1 each, Does not apply, 3 Times Daily PRN      ondansetron 8 MG tablet  Commonly known as:  ZOFRAN   8 mg, Oral, 3 Times Daily PRN      pantoprazole 40 MG EC tablet  Commonly known as:  PROTONIX   40 mg, Oral, Daily         Stop These  Medications    albuterol 108 (90 Base) MCG/ACT inhaler  Commonly known as:  PROVENTIL HFA;VENTOLIN HFA     amLODIPine 10 MG tablet  Commonly known as:  NORVASC     Cranberry 600 MG tablet     letrozole 2.5 MG tablet  Commonly known as:  FEMARA              Discharge Disposition:  Home or Self Care    Discharge Diet:  Diet Instructions     Diet: Regular, Cardiac, Consistent Carbohydrate; Thin       Discharge Diet:   Regular  Cardiac  Consistent Carbohydrate       Fluid Consistency:  Thin          Discharge Activity:   Activity Instructions     Activity as Tolerated               Code Status/Level of Support:  Code Status and Medical Interventions:   Ordered at: 07/20/18 1308     Limited Support to NOT Include:    Vasopressors    Intubation    Cardioversion/Defibrillation     Code Status:    No CPR     Medical Interventions (Level of Support Prior to Arrest):    Limited       Future Appointments  Date Time Provider Department Center   5/13/2019 9:45 AM PRAFUL Wheatley [facility] None       Additional Instructions for the Follow-ups that You Need to Schedule     Discharge Follow-up with PCP    As directed      Currently Documented PCP:  PRAFUL Wheatley  PCP Phone Number:  [phone]    Follow Up Details:  1 week of dc         Discharge Follow-up with Specialty: Oncology per their recs--home OFF her Femera due to going home with Hospice    As directed      Specialty:  Oncology per their recs--home OFF her Femera due to going home with Hospice         Discharge Follow-up with Specified Provider: Hospice to follow on dc today.    As directed      To:  Hospice to follow on dc today.               Time Spent on Discharge:  55 minutes    Electronically signed by PRAFUL Murdock, 07/24/18, 2:44 PM.

## 2018-07-24 NOTE — TELEPHONE ENCOUNTER
Returned call to Keira. Patient will be d/c'd home from the hospital today. She is continuing care with hospice.

## 2018-07-24 NOTE — PLAN OF CARE
Problem: Patient Care Overview  Goal: Plan of Care Review  Outcome: Ongoing (interventions implemented as appropriate)   07/24/18 4510   Coping/Psychosocial   Plan of Care Reviewed With patient   Plan of Care Review   Progress improving   OTHER   Outcome Summary Pt increased ambulation distance to 800ft with CGA, progressing to periods of supervision demonstrating slow samara with decreased step length. Pt limited by fatigue and increased SOA. Continue to progress as appropriate.

## 2018-07-24 NOTE — PLAN OF CARE
Problem: Patient Care Overview  Goal: Interprofessional Rounds/Family Conf  Outcome: Ongoing (interventions implemented as appropriate)  13:00 Palliative Team Conference: LEVI Maldonado RN, CHPN; PRAFUL Ahmadi; KENAN SolisW, [facility]; BRIAN Ojeda, DO; GORDON John MDiv   07/24/18 1451   Interdisciplinary Rounds/Family Conf   Summary Pain management at optimal level within parameters of pt's multiple medication allergies. Hospice has agreed to cover the oral Ketamine within home services. Pt discharge home today with hospice services.       Problem: Palliative Care (Adult)  Intervention: Minimize Discomfort   07/24/18 1451   Promote Oxygenation/Perfusion   Pain Management Interventions pain management plan reviewed with patient/caregiver;see MAR   Coping Strategies   Complementary Therapy massage therapy     Intervention: Optimize Function   07/24/18 1451   Musculoskeletal Interventions   Fatigue Management fatigue-related activity identified;paced activity encouraged     Intervention: Support/Optimize Psychosocial Response   07/24/18 1451   Coping/Psychosocial Interventions   Supportive Measures active listening utilized;decision-making supported;positive reinforcement provided;self-care encouraged;self-reflection promoted;self-responsibility promoted;verbalization of feelings encouraged       Goal: Maximized Comfort  Outcome: Outcome(s) achieved Date Met: 07/24/18 07/24/18 1451   Palliative Care (Adult)   Maximized Comfort making progress toward outcome  (ketamine by oral route for pain)     Goal: Enhanced Quality of Life  Outcome: Ongoing (interventions implemented as appropriate)   07/24/18 1451   Palliative Care (Adult)   Enhanced Quality of Life making progress toward outcome

## 2018-07-24 NOTE — PROGRESS NOTES
Continued Stay Note  [facility]     Patient Name: Betsy Chi  MRN: [id number]  Today's Date: 7/24/2018    Admit Date: 7/11/2018          Discharge Plan     Row Name 07/24/18 1420       Plan    Plan Ongoing     Patient/Family in Agreement with Plan yes    Plan Comments Spoke with Dr Ojeda over the phone and he notified CM that the patient has chosen to have hospice care follow her. CM had previously left a message with a pharmacy in [facility] to check on her Ketamine prescription but Keira Hoffman will now handle that through hospice care. CM will follow and assist as needed - Isatu [phone]              Discharge Codes    No documentation.           Isatu Murcia RN

## 2018-07-24 NOTE — THERAPY TREATMENT NOTE
Acute Care - Physical Therapy Treatment Note  [facility]     Patient Name: Betsy Chi  : 1955  MRN: [id number]  Today's Date: 2018  Onset of Illness/Injury or Date of Surgery: 18  Date of Referral to PT: 18  Referring Physician: Mary Mendoza MD    Admit Date: 2018    Visit Dx:    ICD-10-CM ICD-9-CM   1. Dysphagia, unspecified type R13.10 787.20   2. Impaired functional mobility, balance, gait, and endurance Z74.09 V49.89   3. Bone metastases (CMS/HCC) C79.51 198.5     Patient Active Problem List   Diagnosis   • Mixed anxiety depressive disorder   • Asthma   • Coronary arteriosclerosis in native artery   • Atrial fibrillation (CMS/HCC)   • Biliary dyskinesia   • History of Cerebral infarction (CT scan of brain this hospitalization normal)   • Chronic bronchitis (CMS/HCC)   • Chronic low back pain   • COPD (chronic obstructive pulmonary disease) (CMS/HCC)   • Chronic urinary tract infection   • Hepatic cirrhosis (CMS/HCC)   • Type 2 diabetes mellitus (CMS/HCC)   • Dyslipidemia   • Fibromyalgia   • Gastroesophageal reflux disease without esophagitis   • Hypertension   • Disorder of kidney   • Disorder of endocrine system   • Osteoarthritis   • Osteoporosis   • Pulmonary nodule right lower lobe medially (2 x 1.5 cm).   • Rheumatoid arthritis (CMS/HCC)   • Seasonal allergic rhinitis   • Biliary sludge   • Peptic ulcer of stomach   • Invasive ductal carcinoma of breast, left (CMS/HCC)   • Bone metastases (CMS/HCC)   • Open angle with borderline findings and low glaucoma risk in both eyes   • Palpitations   • Chronic hypoxemic respiratory failure (CMS/HCC)   • Dyspnea   • Atypical chest pain   • Constipation       Therapy Treatment          Rehabilitation Treatment Summary     Row Name 18 1339             Treatment Time/Intention    Discipline physical therapist  -KR      Document Type therapy note (daily note)  -KR      Subjective Information no complaints  -KR      Mode of  Treatment physical therapy  -KR      Care Plan Review care plan/treatment goals reviewed;risks/benefits reviewed;patient/other agree to care plan  -KR      Therapy Frequency (PT Clinical Impression) daily  -KR      Patient Effort good  -KR      Existing Precautions/Restrictions fall  -KR      Recorded by [KR] Joya Cook, PT 07/24/18 1557      Row Name 07/24/18 1335             Vital Signs    Pre Systolic BP Rehab --   VSS; RN cleared for treatment  -KR      O2 Delivery Pre Treatment room air  -KR      O2 Delivery Post Treatment room air  -KR      Pre Patient Position Sitting  -KR      Intra Patient Position Standing  -KR      Post Patient Position Sitting  -KR      Recorded by [KR] Joya Cook, PT 07/24/18 1557      Row Name 07/24/18 1338             Cognitive Assessment/Intervention    Additional Documentation Cognitive Assessment/Intervention (Group)  -KR      Recorded by [KR] Joya Cook, PT 07/24/18 1557      Row Name 07/24/18 133             Cognitive Assessment/Intervention- PT/OT    Affect/Mental Status (Cognitive) sad/depressed affect  -KR      Orientation Status (Cognition) oriented x 4  -KR      Follows Commands (Cognition) WFL  -KR      Cognitive Function (Cognitive) WFL  -KR      Personal Safety Interventions fall prevention program maintained;gait belt;nonskid shoes/slippers when out of bed  -KR      Recorded by [KR] Joya Cook, PT 07/24/18 1557      Row Name 07/24/18 1337             Safety Issues, Functional Mobility    Impairments Affecting Function (Mobility) endurance/activity tolerance;shortness of breath  -KR      Recorded by [KR] Joya Cook, PT 07/24/18 1557      Row Name 07/24/18 1332             Bed Mobility Assessment/Treatment    Comment (Bed Mobility) Pt seated EOB upon arrival and at end of treatment session.   -KR      Recorded by [KR] Joya Cook, PT 07/24/18 1557      Row Name 07/24/18 1332             Transfer Assessment/Treatment    Transfer Assessment/Treatment  sit-stand transfer;stand-sit transfer  -KR      Comment (Transfers) Pt demonstrated good safety awareness with transfers.   -KR      Recorded by [KR] Joya Cook, PT 07/24/18 1557      Row Name 07/24/18 1339             Sit-Stand Transfer    Sit-Stand [place] (Transfers) supervision  -KR      Recorded by [KR] Joya Cook, PT 07/24/18 1557      Row Name 07/24/18 1339             Stand-Sit Transfer    Stand-Sit [place] (Transfers) supervision  -KR      Recorded by [KR] Joya Cook, PT 07/24/18 1557      Row Name 07/24/18 1339             Gait/Stairs Assessment/Training    75874 - Gait Training Minutes  29  -KR      Gait/Stairs Assessment/Training gait/ambulation independence  -KR      [place] Level (Gait) contact guard;verbal cues  -KR      Distance in Feet (Gait) 800  -KR      Pattern (Gait) step-through  -KR      Deviations/Abnormal Patterns (Gait) samara decreased;other (see comments)   decreased step length  -KR      Comment (Gait/Stairs) Pt demonstrated step through gait pattern with slow samara and decreased step length. Pt demonstrated good stability with no LOB. Pt c/o increased SOA toward end of ambulation.   -KR      Recorded by [KR] Joya Cook, PT 07/24/18 1557      Row Name 07/24/18 1339             Motor Skills Assessment/Interventions    Additional Documentation Balance (Group)  -KR      Recorded by [KR] Joya Cook, PT 07/24/18 1557      Row Name 07/24/18 1339             Balance    Balance static sitting balance;static standing balance  -KR      Recorded by [KR] Joya Cook, PT 07/24/18 1557      Row Name 07/24/18 1339             Static Sitting Balance    Level of [place] (Unsupported Sitting, Static Balance) independent  -KR      Sitting Position (Unsupported Sitting, Static Balance) sitting on edge of bed  -KR      Recorded by [KR] Joya Cook, PT 07/24/18 1557      Row Name 07/24/18 1339             Static Standing Balance    Level of [place]  (Supported Standing, Static Balance) independent  -KR      Recorded by [KR] Joya Cook, PT 07/24/18 1557      Row Name 07/24/18 1330             Positioning and Restraints    Pre-Treatment Position in bed  -KR      Post Treatment Position bed  -KR      In Bed notified nsg;sitting EOB;call light within reach;encouraged to call for assist  -KR      Recorded by [SERGE] Joya Cook, PT 07/24/18 1557      Row Name 07/24/18 1330             Pain Assessment    Additional Documentation Pain Scale: Numbers Pre/Post-Treatment (Group)  -KR      Recorded by [SERGE] Joya DRISS Cook, PT 07/24/18 1557      Row Name 07/24/18 1335             Pain Scale: Numbers Pre/Post-Treatment    Pain Scale: Numbers, Pretreatment 0/10 - no pain  -KR      Pain Scale: Numbers, Post-Treatment 0/10 - no pain  -KR      Recorded by [SERGE] Joya Cook, PT 07/24/18 1557      Row Name 07/24/18 1332             Plan of Care Review    Plan of Care Reviewed With patient  -KR      Recorded by [KR] Joya Cook, PT 07/24/18 1557      Row Name 07/24/18 1337             Outcome Summary/Treatment Plan (PT)    Daily Summary of Progress (PT) progress toward functional goals is good  -KR      Recorded by [SERGE] Joya Cook, PT 07/24/18 7646        User Key  (r) = Recorded By, (t) = Taken By, (c) = Cosigned By    Initials Name Effective Dates Discipline    SERGE RodriguezJoya DRISS Cook, PT 04/03/18 -  PT          Rash 07/11/18 1300 other (see comments) hand patch (Active)   Distribution generalized 7/24/2018  8:30 AM   Configuration/Shape asymmetric 7/24/2018  8:30 AM   Borders irregular 7/24/2018  8:30 AM   Characteristics itching 7/24/2018  8:30 AM   Color pink 7/24/2018  8:30 AM   Lesion Size greater than 1 cm 7/24/2018  8:30 AM             Physical Therapy Education     Title: PT OT SLP Therapies (Done)     Topic: Physical Therapy (Done)     Point: Mobility training (Done)    Learning Progress Summary     Learner Status Readiness Method Response Comment Documented by     Patient Done Acceptance JOAQUIN PEREZ 07/24/18 4427          Point: Home exercise program (Done)    Learning Progress Summary     Learner Status Readiness Method Response Comment Documented by    Patient Done Acceptance JOAQUIN PEREZ 07/24/18 1637          Point: Body mechanics (Done)    Learning Progress Summary     Learner Status Readiness Method Response Comment Documented by    Patient Done Acceptance JOAQUIN PEREZ 07/24/18 7397          Point: Precautions (Done)    Learning Progress Summary     Learner Status Readiness Method Response Comment Documented by    Patient Done Acceptance JOAQUIN PEREZ 07/24/18 6327                      User Key     Initials Effective Dates Name Provider Type Discipline    SERGE 04/03/18 -  Joya Cook, PT Physical Therapist PT                    PT Recommendation and Plan  Therapy Frequency (PT Clinical Impression): daily  Outcome Summary/Treatment Plan (PT)  Daily Summary of Progress (PT): progress toward functional goals is good  Plan of Care Reviewed With: patient  Progress: improving  Outcome Summary: Pt increased ambulation distance to 800ft with CGA, progressing to periods of supervision demonstrating slow samara with decreased step length. Pt limited by fatigue and increased SOA. Continue to progress as appropriate.           Outcome Measures     Row Name 07/24/18 6112             How much help from another person do you currently need...    Turning from your back to your side while in flat bed without using bedrails? 4  -KR      Moving from lying on back to sitting on the side of a flat bed without bedrails? 4  -KR      Moving to and from a bed to a chair (including a wheelchair)? 3  -KR      Standing up from a chair using your arms (e.g., wheelchair, bedside chair)? 3  -KR      Climbing 3-5 steps with a railing? 3  -KR      To walk in hospital room? 3  -KR      AM-PAC 6 Clicks Score 20  -KR         Functional Assessment    Outcome Measure Options AM-PAC 6 Clicks Basic Mobility  (PT)  -KR        User Key  (r) = Recorded By, (t) = Taken By, (c) = Cosigned By    Initials Name Provider Type    SERGE Cook, PT Physical Therapist           Time Calculation:         PT Charges     Row Name 07/24/18 1339             Time Calculation    Start Time 1339  -KR      PT Received On 07/24/18  -KR      PT Goal Re-Cert Due Date 07/30/18  -KR         Time Calculation- PT    Total Timed Code Minutes- PT 29 minute(s)  -KR         Timed Charges    30037 - Gait Training Minutes  29  -KR        User Key  (r) = Recorded By, (t) = Taken By, (c) = Cosigned By    Initials Name Provider Type    SERGE Cook, PT Physical Therapist        Therapy Suggested Charges     Code   Minutes Charges    20830 (CPT®) Hc Pt Neuromusc Re Education Ea 15 Min      66218 (CPT®) Hc Pt Ther Proc Ea 15 Min      17996 (CPT®) Hc Gait Training Ea 15 Min 29 2    13981 (CPT®) Hc Pt Therapeutic Act Ea 15 Min      90553 (CPT®) Hc Pt Manual Therapy Ea 15 Min      67126 (CPT®) Hc Pt Iontophoresis Ea 15 Min      37460 (CPT®) Hc Pt Elec Stim Ea-Per 15 Min      91343 (CPT®) Hc Pt Ultrasound Ea 15 Min      43195 (CPT®) Hc Pt Self Care/Mgmt/Train Ea 15 Min      Total  29 2        Therapy Charges for Today     Code Description Service Date Service Provider Modifiers Qty    [id number] HC GAIT TRAINING EA 15 MIN 7/24/2018 Joya Cook, PT GP 2          PT G-Codes  Outcome Measure Options: AM-PAC 6 Clicks Basic Mobility (PT)    Christina Cook, PT  7/24/2018

## 2018-07-24 NOTE — PAYOR COMM NOTE
"Kera Chi (62 y.o. Female)   Ref # [id number]  Appeal for denial please for dates 7/20-7/24. Clinicals were faxed on 7/19/18 to [phone]. Please reconsider this case.  Nguyen Singleton RN, BSN  Phone # [phone]  Fax # [phone]    Date of Birth Social Security Number Address Home Phone MRN    1955  [address] [place], [place] [address] [phone] [id number]    Yazdanism Marital Status          Jainism Legally        Admission Date Admission Type Admitting Provider Attending Provider Department, Room/Bed    7/11/18 Elective Shahriar Kellogg MD Dossett, Lee M, MD [facility] 6B, N625/1    Discharge Date Discharge Disposition Discharge Destination                       Attending Provider:  Shahriar Kellogg MD    Allergies:  Ampicillin, Erythromycin, Morphine And Related, Oxycodone-acetaminophen, Oxycodone-aspirin, Penicillins, Sulfa Antibiotics, Fentanyl, Aspirin, Codeine, Contrast Dye, Ibuprofen, Latex, Propoxyphene, Saccharin, Tramadol    Isolation:  None   Infection:  None   Code Status:  No CPR    Ht:  160 cm (62.99\")   Wt:  82.1 kg (181 lb)    Admission Cmt:  None   Principal Problem:  Acute on chronic hypoxemic respiratory failure not requiring ventilatory support [J96.91]                 Active Insurance as of 7/11/2018     Primary Coverage     Payor Plan Insurance Group Employer/Plan Group    PASSPORT PASSPORT MEDICAID     Payor Plan Address Payor Plan Phone Number Effective From Effective To    [address] [phone] 11/1/1997     [facility] [address]       Subscriber Name Subscriber Birth Date Member ID       KERA CHI 1955 [id number]                 Emergency Contacts      (Rel.) Home Phone Work Phone Mobile Phone    Fely Stringer (Relative) [phone] -- --    Camacho Stringer (Son) -- -- [phone]    Niya Stringer (Grandchild) [phone] -- --        Keira Hoffman RN Case Manager Signed Hospice  Progress Notes Date of " Service: 7/23/2018  5:07 PM         []Manual[]Template  []Copied  Continued Stay Note   Lul     Patient Name: Betsy Chi                 MRN: [id number]  Today's Date: 7/23/2018                     Admit Date: 7/11/2018                       Discharge Plan      Row Name 07/23/18 1704           Plan     Plan Undetermined     Plan Comments Chart reviewed.  Awake, alert, pleasant and interactive.  No obvious distress.  No complaints during visit.  Granddtr at bedside with 2 small great grandchildren.  One snuggled up in bed with pt.  Discussed plan for visit in AM - pt agreeable.  Will follow for hospice support and to assist/facilitate access to hospice services if elected.  If can be of further assist please contact......ext [phone].     Row Name 07/23/18 1554           Plan     Plan Ongoing      Patient/Family in Agreement with Plan yes     Plan Comments Revisited with patient at bedside - Her goal remains to return home where she lives with her nephew. She did voice the need for a wheelchair due to her severe pain and weakness at times and need to have a way to get to all of her follow up appointments. Wheelchair order sent to [facility] where her oxygen is supplied - CM following - aw [phone]                  Discharge Codes    No documentation.               Keira Hoffman, SAVI Murcia RN Registered Nurse Signed Case Management  Progress Notes Date of Service: 7/23/2018  3:56 PM         []Manual[]Template  []Copied  Continued Stay Note   Lul     Patient Name: Betsy Chi                 MRN: [id number]  Today's Date: 7/23/2018                     Admit Date: 7/11/2018                       Discharge Plan      Row Name 07/23/18 1554           Plan     Plan Ongoing      Patient/Family in Agreement with Plan yes     Plan Comments Revisited with patient at bedside - Her goal remains to return home where she lives with her nephew. She did voice the need for a  wheelchair due to her severe pain and weakness at times and need to have a way to get to all of her follow up appointments. Wheelchair order sent to [facility] where her oxygen is supplied - CM following - aw [phone]                  Discharge Codes    No documentation.               Isatu Murcia RN                              Physician Progress Notes (last 72 hours) (Notes from 7/21/2018  2:05 PM through 7/24/2018  2:05 PM)      Nadir Ojeda DO at 7/24/2018 12:40 PM          Palliative Care Progress Note    Date of Admission: 7/11/2018    Subjective:  Patient feels that her pain is somewhat better controlled today.  States that she has been up walking around quite a bit over the weekend.  Current Code Status     Date Active Code Status Order ID Comments User Context       7/11/2018 12:54 PM CPR [id number]  Ania De Leon DO Inpatient       Questions for Current Code Status     Question Answer Comment    Code Status CPR     Medical Interventions (Level of Support Prior to Arrest) Full     Level Of Support Discussed With Patient         No current facility-administered medications on file prior to encounter.      Current Outpatient Prescriptions on File Prior to Encounter   Medication Sig Dispense Refill   • albuterol (PROVENTIL HFA;VENTOLIN HFA) 108 (90 Base) MCG/ACT inhaler Inhale 2 puffs Every 4 (Four) Hours As Needed for Wheezing. 1 inhaler 2   • aluminum acetate (DOMEBORO) pack packet Apply  topically 3 (Three) Times a Day As Needed (psoriatic arthritis). 100 each 6   • atorvastatin (LIPITOR) 20 MG tablet Take 1 tablet by mouth Every Night. 90 tablet 0   • Cranberry 600 MG tablet Take 1 tablet by mouth Daily.     • glucose blood test strip Use as instructed 100 each 12   • glucose monitor monitoring kit 1 each 3 (Three) Times a Day As Needed (hypo/hyperglycemia). 1 each 3   • Lancets (ACCU-CHEK SOFT TOUCH) lancets Three times daily and as needed 100 each 12   • letrozole (FEMARA) 2.5 MG tablet Take 1  "tablet by mouth Daily. 30 tablet 11   • ondansetron (ZOFRAN) 8 MG tablet Take 1 tablet by mouth 3 (Three) Times a Day As Needed for Nausea or Vomiting. 30 tablet 5   • pantoprazole (PROTONIX) 40 MG EC tablet Take 1 tablet by mouth Daily. 90 tablet 2   • [DISCONTINUED] amLODIPine (NORVASC) 10 MG tablet TAKE ONE TABLET BY MOUTH DAILY 30 tablet 0   • [DISCONTINUED] carvedilol (COREG) 12.5 MG tablet TAKE ONE TABLET BY MOUTH TWICE A DAY WITH MEALS 60 tablet 0       ketamine (KETALAR) infusion 7 mcg/kg/min Last Rate: Stopped (07/21/18 1220)     •  acetaminophen  •  aluminum acetate  •  bisacodyl  •  diphenhydrAMINE **OR** diphenhydrAMINE  •  ipratropium-albuterol  •  LORazepam  •  ondansetron  •  polyethylene glycol  •  sodium chloride    Objective: /74   Pulse 74   Temp 98.2 °F (36.8 °C) (Oral)   Resp 16   Ht 160 cm (62.99\")   Wt 82.1 kg (181 lb)   SpO2 96%   BMI 32.07 kg/m²       Intake/Output Summary (Last 24 hours) at 07/24/18 1240  Last data filed at 07/24/18 0900   Gross per 24 hour   Intake              920 ml   Output                0 ml   Net              920 ml     Physical Exam:      General Appearance:    Alert, cooperative,    Head:    Normocephalic, without obvious abnormality, atraumatic   Eyes:            Lids and lashes normal, conjunctivae and sclerae normal, no   icterus, no pallor, corneas clear, PERRLA   Ears:    Ears appear intact with no abnormalities noted   Throat:   No oral lesions, no thrush, oral mucosa moist   Neck:   No adenopathy, supple, trachea midline, no thyromegaly, no     carotid bruit, no JVD   Back:     No kyphosis present, no scoliosis present, no skin lesions,       erythema or scars, no tenderness to percussion or                   palpation,   range of motion normal   Lungs:     Clear to auscultation,respirations regular, even and                   unlabored    Heart:    Regular rhythm and normal rate, normal S1 and S2, no            murmur, no gallop, no rub, no " click   Breast Exam:    Deferred   Abdomen:     Normal bowel sounds, no masses, no organomegaly, soft        non-tender, non-distended, no guarding, no rebound                 tenderness   Genitalia:    Deferred   Extremities:   Swelling noted over base of left thumb.  No tenderness in snuff box noted.   Pulses:   Pulses palpable and equal bilaterally   Skin:   No bleeding, bruising or rash   Lymph nodes:   No palpable adenopathy   Neurologic:   Cranial nerves 2 - 12 grossly intact, sensation intact, DTR        present and equal bilaterally       Results from last 7 days  Lab Units 07/20/18  0650   WBC 10*3/mm3 5.82   HEMOGLOBIN g/dL 12.3   HEMATOCRIT % 37.8   PLATELETS 10*3/mm3 208       Results from last 7 days  Lab Units 07/20/18  0650   SODIUM mmol/L 139   POTASSIUM mmol/L 4.0   CHLORIDE mmol/L 108   CO2 mmol/L 25.0   BUN mg/dL 11   CREATININE mg/dL 0.62   CALCIUM mg/dL 8.5*   GLUCOSE mg/dL 106*       Impression: Breast cancer with bone metastasis  Dysphagia  Dyspnea  Goals of care  Plan:  Home with hospice today.  Will continiue Ketamine orally.        Nadir Ojeda DO  07/24/18  12:40 PM        Electronically signed by Nadir Ojeda DO at 7/24/2018 12:41 PM     Nadir Ojeda DO at 7/23/2018  1:48 PM          Palliative Care Progress Note    Date of Admission: 7/11/2018    Subjective:  Patient feels that her pain is somewhat better controlled today.  States that she has been up walking around quite a bit over the weekend.  Current Code Status     Date Active Code Status Order ID Comments User Context       7/11/2018 12:54 PM CPR [id number]  Ania De Leon DO Inpatient       Questions for Current Code Status     Question Answer Comment    Code Status CPR     Medical Interventions (Level of Support Prior to Arrest) Full     Level Of Support Discussed With Patient         No current facility-administered medications on file prior to encounter.      Current Outpatient Prescriptions on File Prior to  "Encounter   Medication Sig Dispense Refill   • albuterol (PROVENTIL HFA;VENTOLIN HFA) 108 (90 Base) MCG/ACT inhaler Inhale 2 puffs Every 4 (Four) Hours As Needed for Wheezing. 1 inhaler 2   • aluminum acetate (DOMEBORO) pack packet Apply  topically 3 (Three) Times a Day As Needed (psoriatic arthritis). 100 each 6   • amLODIPine (NORVASC) 10 MG tablet TAKE ONE TABLET BY MOUTH DAILY 30 tablet 0   • atorvastatin (LIPITOR) 20 MG tablet Take 1 tablet by mouth Every Night. 90 tablet 0   • carvedilol (COREG) 12.5 MG tablet TAKE ONE TABLET BY MOUTH TWICE A DAY WITH MEALS 60 tablet 0   • Cranberry 600 MG tablet Take 1 tablet by mouth Daily.     • glucose blood test strip Use as instructed 100 each 12   • glucose monitor monitoring kit 1 each 3 (Three) Times a Day As Needed (hypo/hyperglycemia). 1 each 3   • Lancets (ACCU-CHEK SOFT TOUCH) lancets Three times daily and as needed 100 each 12   • letrozole (FEMARA) 2.5 MG tablet Take 1 tablet by mouth Daily. 30 tablet 11   • ondansetron (ZOFRAN) 8 MG tablet Take 1 tablet by mouth 3 (Three) Times a Day As Needed for Nausea or Vomiting. 30 tablet 5   • pantoprazole (PROTONIX) 40 MG EC tablet Take 1 tablet by mouth Daily. 90 tablet 2       ketamine (KETALAR) infusion 7 mcg/kg/min Last Rate: Stopped (07/21/18 1220)     •  acetaminophen  •  aluminum acetate  •  bisacodyl  •  diphenhydrAMINE **OR** diphenhydrAMINE  •  ipratropium-albuterol  •  LORazepam  •  ondansetron  •  polyethylene glycol  •  sodium chloride    Objective: /69   Pulse 72   Temp 98.5 °F (36.9 °C) (Oral)   Resp 18   Ht 160 cm (62.99\")   Wt 82.1 kg (181 lb)   SpO2 96%   BMI 32.07 kg/m²       Intake/Output Summary (Last 24 hours) at 07/23/18 1348  Last data filed at 07/23/18 1245   Gross per 24 hour   Intake              560 ml   Output                0 ml   Net              560 ml     Physical Exam:      General Appearance:    Alert, cooperative,    Head:    Normocephalic, without obvious abnormality, " atraumatic   Eyes:            Lids and lashes normal, conjunctivae and sclerae normal, no   icterus, no pallor, corneas clear, PERRLA   Ears:    Ears appear intact with no abnormalities noted   Throat:   No oral lesions, no thrush, oral mucosa moist   Neck:   No adenopathy, supple, trachea midline, no thyromegaly, no     carotid bruit, no JVD   Back:     No kyphosis present, no scoliosis present, no skin lesions,       erythema or scars, no tenderness to percussion or                   palpation,   range of motion normal   Lungs:     Clear to auscultation,respirations regular, even and                   unlabored    Heart:    Regular rhythm and normal rate, normal S1 and S2, no            murmur, no gallop, no rub, no click   Breast Exam:    Deferred   Abdomen:     Normal bowel sounds, no masses, no organomegaly, soft        non-tender, non-distended, no guarding, no rebound                 tenderness   Genitalia:    Deferred   Extremities:   Swelling noted over base of left thumb.  No tenderness in snuff box noted.   Pulses:   Pulses palpable and equal bilaterally   Skin:   No bleeding, bruising or rash   Lymph nodes:   No palpable adenopathy   Neurologic:   Cranial nerves 2 - 12 grossly intact, sensation intact, DTR        present and equal bilaterally       Results from last 7 days  Lab Units 07/20/18  0650   WBC 10*3/mm3 5.82   HEMOGLOBIN g/dL 12.3   HEMATOCRIT % 37.8   PLATELETS 10*3/mm3 208       Results from last 7 days  Lab Units 07/20/18  0650   SODIUM mmol/L 139   POTASSIUM mmol/L 4.0   CHLORIDE mmol/L 108   CO2 mmol/L 25.0   BUN mg/dL 11   CREATININE mg/dL 0.62   CALCIUM mg/dL 8.5*   GLUCOSE mg/dL 106*       Impression: Breast cancer with bone metastasis  Dysphagia  Dyspnea  Goals of care  Plan:  Hospice case manager to talk to patient today.  I will continue to current dose of ketamine and with patient decides whether not she wants hospice we will look at what route is viable.        Nadir Ojeda,  "  18  1:48 PM        Electronically signed by Nadir Ojeda DO at 2018  1:49 PM     PRAFUL Murdock at 2018  9:50 AM              [facility] Medicine Services  PROGRESS NOTE    Patient Name: Betsy Chi  : 1955  MRN: [id number]    Date of Admission: 2018  Length of Stay: 12  Primary Care Physician: PRAFUL Wheatley    Subjective   Subjective     CC: f/u n/v and UTI    HPI:  Patient is seen standing at side of bed making her bed today.  She appears to be more comfortable but when asked states she is still hurting pretty bad.  Rates her pain currently 5-6/10 scale.  She did start taking oral ketamine last night.  States she seems to be doing \"okay so far\" but is worried that she will have a reaction later today.  Tolerating small meals with intermittent nausea.  No vomiting.  No chest pain or shortness of air.  Still labile emotions crying intermittently.  No new issues.    Review of Systems  Gen- No fevers, chills  CV- No chest pain, palpitations  Resp- No cough, dyspnea  GI-  No V/D, abd pain, +nausea intermittently    Otherwise ROS is negative except as mentioned in the HPI.    Objective   Objective     Vital Signs:   Temp:  [98.2 °F (36.8 °C)-98.8 °F (37.1 °C)] 98.5 °F (36.9 °C)  Heart Rate:  [72-82] 72  Resp:  [18] 18  BP: (113-125)/(63-77) 120/69        Physical Exam:  Constitutional: No acute distress, awake, alert, chronically ill appearing.  Standing it side of bed making her bed currently.  No visitors at bedside.  HENT: NCAT, mucous membranes moist  Respiratory: Clear to auscultation bilaterally A/P, respiratory effort normal On RA  Cardiovascular: RRR, no murmurs, rubs, or gallops,  Gastrointestinal: Positive bowel sounds, soft, nontender, nondistended.  Obese abd   Musculoskeletal: No bilateral ankle edema.  ESPARZA spont  Psychiatric: Appropriate affect, cooperative.  Labile emotions.  Crying frequently States it's \"a little better " "today\".   Neurologic: Oriented x 3, no focal deficits.  Speech clear and appropriate. Follows commands  Skin: + psoriasis patches noted to palms of hands.       Results Reviewed:  I have personally reviewed current lab, radiology, and data and agree.      Results from last 7 days  Lab Units 07/20/18  0650   WBC 10*3/mm3 5.82   HEMOGLOBIN g/dL 12.3   HEMATOCRIT % 37.8   PLATELETS 10*3/mm3 208       Results from last 7 days  Lab Units 07/20/18  0650   SODIUM mmol/L 139   POTASSIUM mmol/L 4.0   CHLORIDE mmol/L 108   CO2 mmol/L 25.0   BUN mg/dL 11   CREATININE mg/dL 0.62   GLUCOSE mg/dL 106*   CALCIUM mg/dL 8.5*     Estimated Creatinine Clearance: 93.1 mL/min (by C-G formula based on SCr of 0.62 mg/dL).  No results found for: BNP    Microbiology Results Abnormal     Procedure Component Value - Date/Time    Urine Culture - Urine, [[id number]]  (Abnormal)  (Susceptibility) Collected:  07/13/18 1636    Lab Status:  Final result Specimen:  Urine from Urine, Clean Catch Updated:  07/15/18 1032     Urine Culture >100,000 CFU/mL Escherichia coli (A)    Susceptibility      Escherichia coli     CHAMP     Ampicillin <=8 ug/ml Susceptible     Ampicillin + Sulbactam <=8/4 ug/ml Susceptible     Aztreonam <=8 ug/ml Susceptible     Cefepime <=8 ug/ml Susceptible     Cefotaxime <=2 ug/ml Susceptible     Ceftriaxone <=8 ug/ml Susceptible     Cefuroxime sodium <=4 ug/ml Susceptible     Cephalothin 16 ug/ml Intermediate     Ertapenem <=1 ug/ml Susceptible     Gentamicin <=4 ug/ml Susceptible     Levofloxacin <=2 ug/ml Susceptible     Meropenem <=1 ug/ml Susceptible     Nitrofurantoin <=32 ug/ml Susceptible     Piperacillin + Tazobactam <=16 ug/ml Susceptible     Tetracycline <=4 ug/ml Susceptible     Tobramycin <=4 ug/ml Susceptible     Trimethoprim + Sulfamethoxazole <=2/38 ug/ml Susceptible                        Results for orders placed during the hospital encounter of 07/11/18   Adult Transthoracic Echo Complete W/ Cont if Necessary Per " Protocol    Narrative · Left ventricular systolic function is normal. Estimated EF = 65%.  · Left ventricular wall thickness is consistent with mild concentric   hypertrophy.  · Left ventricular diastolic dysfunction (grade II) consistent with   pseudonormalization.  · Estimated right ventricular systolic pressure from tricuspid   regurgitation is normal (<35 mmHg).          I have reviewed the medications.    Assessment/Plan   Assessment / Plan     Hospital Problem List     * (Principal)Acute on chronic hypoxemic respiratory failure not requiring ventilatory support    Mixed anxiety depressive disorder    Atrial fibrillation (CMS/HCC)    COPD (chronic obstructive pulmonary disease) (CMS/HCC)    Type 2 diabetes mellitus (CMS/HCC)    Dyslipidemia    Hypertension    Pulmonary nodule right lower lobe medially (2 x 1.5 cm).    Overview Signed 5/22/2017  1:30 PM by Janine Mckeon PA-C     Description: A.  CT scan of the chest February 11 thousand 16 reports a noncalcified 8.5-9 mm nodule in the medial portion of the right lower lobe.         Rheumatoid arthritis (CMS/HCC)    Invasive ductal carcinoma of breast, left (CMS/HCC)    Chronic hypoxemic respiratory failure (CMS/HCC)    Overview Signed 7/12/2018  2:01 PM by Blade Taylor MD     2Lnc chronically         Dyspnea    Atypical chest pain    Acute on chronic diastolic CHF (congestive heart failure) (CMS/HCC)    Constipation         Brief Hospital Course to date:  Betsy Chi is a 62 y.o. female w/ metastatic breast cancer, copd, chronic 2Lnc use, ? Hx afib, dm2 ra presented as direct admisison from dr. centeno office for multiple complaints including diffuse pain, dyspnea.      Assessment & Plan:  - Abdominal pain and vomiting, etiology unknown, continue supportive therapy as below. IVF, antiemetics  ---slowly improving, still with intermittent nausea, no vomiting, but poor appetite     - BRET, suspect this was pre-renal, sec to n/v, Cr baseline is  "0.7-0.8.    Resolved after IVF.      - Ecoli UTI, s/p merrem, now on cefdinir - end date in place    - Metastatic breast CA , Dr Mendoza following, continue letrozole, s/p zometa 7/12, plan to start ibrance once she returns home  -- palliative following for symptoms.  Ketamine gtt dcd 7/21 due to vision issues reported.  Palliative medicine visit patient again yesterday evening. She agreed to trial of oral ketamine due to her multiple medication allergies limiting her oral pain medication options.  States she is tolerating it \"okay for now\" but is very anxious that she will have an allergic reaction today or even tomorrow due to this being her history with medicines.  She wishes to stay in hospital until she knows that this medicine will work for her.  --Awaiting palliative further recommendations    - Acute respiratory failure, suspect volume overload, echo normal ef, diastolic dysfunction, ct angio no pulm embolism, continue prn diuresis, O2 supp, hold on steroids  Improved     - Back pain with diffuse \"bone pain\", suspect this is sec to bony mets, palliative consulted and assisting. Palliative managing pain control.   Pt report a little improvement noted and appears slightly more comfortable today. Up moving around room some on my exam.  Trialing oral ketamine.    - Cards & heme-onc & palliative following (no ischemic evaluation, treat medically), suspected Afib per cards notes (data deficient)  - Continue bowel regimen (miralax bid, senokot bid, prn dulcolax) as constipation likely causing some discomfort  - steroid cream to rash low back  --ok to dc home per oncology per Dr Dailey note 7/21 when medically ready     Dispo - home early to mid week once symptoms better controlled pending palliative final recommendations for pain meds    DVT Prophylaxis: [facility]     CODE STATUS:   Code Status and Medical Interventions:   Ordered at: 07/20/18 1308     Limited Support to NOT Include:    Vasopressors    Intubation    " "Cardioversion/Defibrillation     Code Status:    No CPR     Medical Interventions (Level of Support Prior to Arrest):    Limited       Disposition: anticipate d/c home once pain is reasonably controlled, ??TBD    Electronically signed by PRAFUL Murdock, 07/23/18, 11:22 AM.      Electronically signed by PRAFUL Murdock at 7/23/2018 11:27 AM     Mesha Arguello MD at 7/22/2018  6:35 PM          Palliative Care Progress Note    Date of Admission: 7/11/2018    Subjective:    Had a fine night w/o ketamine infusion.  Pain has increased today though and pt rates it from 5-7 range.  No vision complaint.  No nausea, has eaten well.  Still emotional, crying off and on.  Says she could use some Beandry due to some allergy symptoms today of itchy nose with sneezing and rhinorrhea.      Current Code Status     Date Active Code Status Order ID Comments User Context       7/11/2018 12:54 PM CPR [id number]  Ania De Leon, DO Inpatient       Questions for Current Code Status     Question Answer Comment    Code Status CPR     Medical Interventions (Level of Support Prior to Arrest) Full     Level Of Support Discussed With Patient         Meds reviewed: ativan Q8, marinol BID  PRNs given ativan and neb tx.    Objective: /77 (BP Location: Right arm, Patient Position: Lying)   Pulse 68   Temp 98.2 °F (36.8 °C) (Oral)   Resp 18   Ht 160 cm (62.99\")   Wt 82.1 kg (181 lb)   SpO2 96%   BMI 32.07 kg/m²     No intake or output data in the 24 hours ending 07/22/18 4876  Physical Exam:  Gen: Chronically ill appearing female, in bed, tearful  Head/neck: NC/AT, trachea midline  EENT: EOMI, patent nares, mucous membranes moist  Resp: Clear to auscultation bilaterally, respiratory effort normal   CV: RRR, no murmur  Abd: Soft, nontender, nondistended  Ext: No bilateral ankle edema  Skin: warm, dry, psoriasis noted on hands  Neuro: Awake, alert, no focal deficit, no myoclonus  Psychiatric: Crying with " "talk about cancer status, goals      Results from last 7 days  Lab Units 18  0650   WBC 10*3/mm3 5.82   HEMOGLOBIN g/dL 12.3   HEMATOCRIT % 37.8   PLATELETS 10*3/mm3 208       Results from last 7 days  Lab Units 18  0650   SODIUM mmol/L 139   POTASSIUM mmol/L 4.0   CHLORIDE mmol/L 108   CO2 mmol/L 25.0   BUN mg/dL 11   CREATININE mg/dL 0.62   CALCIUM mg/dL 8.5*   GLUCOSE mg/dL 106*       Impression:   61yo F with Breast cancer metastatic to bone    Symptoms:  Back pain and diffuse Bone pain  Nausea  Double vision, resolved  Dysphagia  Dyspnea  Mood lability  Multiple medication intolerances and Opiate anaphylaxis    Plan:    -Long talk educating pt about disease status.  Discussed results of her last PET scan and CT chest.  -Spoke extensively about limited options for pain control given pt's adverse reactions and allergies to multiple meds.  Validated her fears about possible side efffects of ketamine.  After further discussion, pt is willing to try oral ketamine.  10mg PO TID ordered to start this evening.  -Pt concerned about DNR Code status.  After review of disease status and prognosis, pt agrees she does indeed wish to be DNR.  -As noted above, most of visit spent providing pt support and education.  Her goal is to be active for as long as she can.  She states \"there are still some things I want to do.\"  F/U planned with Oncology for further tx.  Will need Palliative Care clinic f/u though Hospice was consulted by Dr. Ojeda to provide pt with further info about that option.    (45 min F2F time)       Mesha Arguello MD  18  6:36 PM        Electronically signed by Mesha Arguello MD at 2018  7:53 AM     RPAFUL Murdock at 2018  9:05 AM              [facility] Medicine Services  PROGRESS NOTE    Patient Name: Betsy Chi  : 1955  MRN: [id number]    Date of Admission: 2018  Length of Stay: 11  Primary Care Physician: Freddy" "PRAFUL Maldonado    Subjective   Subjective     CC: f/u n/v and UTI    HPI:  Is seen resting upright in bed in no acute distress.  Labile emotions, crying frequently.  Ketamine drip currently off.  Patient states she feels a little better today.  Vision is back to normal.  Still having intermittent nausea but no vomiting.  Tolerating small amounts of food with poor appetite.  Having loose stools which she relates to stool softeners, which she is now holding.  Hopes to discharge home in the next couple of days if oral pain medication works.  No new issues.      Review of Systems  Gen- No fevers, chills  CV- No chest pain, palpitations  Resp- No cough, dyspnea  GI-  No V/D, abd pain, +nausea intermittently    Otherwise ROS is negative except as mentioned in the HPI.    Objective   Objective     Vital Signs:   Temp:  [98 °F (36.7 °C)-98.5 °F (36.9 °C)] 98.1 °F (36.7 °C)  Heart Rate:  [68-86] 68  Resp:  [17-18] 18  BP: (113-138)/(63-91) 113/83        Physical Exam:  Constitutional: No acute distress, awake, alert, chronically ill appearing.  Upright in bed.  No visitors at bedside.  HENT: NCAT, mucous membranes moist  Respiratory: Clear to auscultation bilaterally A/P, respiratory effort normal   Cardiovascular: RRR, no murmurs, rubs, or gallops,  Gastrointestinal: Positive bowel sounds, soft, nontender, nondistended.  Obese abd   Musculoskeletal: No bilateral ankle edema.  ESPARZA spont  Psychiatric: Appropriate affect, cooperative.  Labile emotions.  Crying frequently and states she \"can't control it\".   Neurologic: Oriented x 3, no focal deficits.  Speech clear and appropriate. Follows commands  Skin: + psoriasis patches noted to palms of hands.       Results Reviewed:  I have personally reviewed current lab, radiology, and data and agree.      Results from last 7 days  Lab Units 07/20/18  0650   WBC 10*3/mm3 5.82   HEMOGLOBIN g/dL 12.3   HEMATOCRIT % 37.8   PLATELETS 10*3/mm3 208       Results from last 7 days  Lab Units " 07/20/18  0650   SODIUM mmol/L 139   POTASSIUM mmol/L 4.0   CHLORIDE mmol/L 108   CO2 mmol/L 25.0   BUN mg/dL 11   CREATININE mg/dL 0.62   GLUCOSE mg/dL 106*   CALCIUM mg/dL 8.5*     Estimated Creatinine Clearance: 93.1 mL/min (by C-G formula based on SCr of 0.62 mg/dL).  No results found for: BNP    Microbiology Results Abnormal     Procedure Component Value - Date/Time    Urine Culture - Urine, [[id number]]  (Abnormal)  (Susceptibility) Collected:  07/13/18 1636    Lab Status:  Final result Specimen:  Urine from Urine, Clean Catch Updated:  07/15/18 1032     Urine Culture >100,000 CFU/mL Escherichia coli (A)    Susceptibility      Escherichia coli     CHAMP     Ampicillin <=8 ug/ml Susceptible     Ampicillin + Sulbactam <=8/4 ug/ml Susceptible     Aztreonam <=8 ug/ml Susceptible     Cefepime <=8 ug/ml Susceptible     Cefotaxime <=2 ug/ml Susceptible     Ceftriaxone <=8 ug/ml Susceptible     Cefuroxime sodium <=4 ug/ml Susceptible     Cephalothin 16 ug/ml Intermediate     Ertapenem <=1 ug/ml Susceptible     Gentamicin <=4 ug/ml Susceptible     Levofloxacin <=2 ug/ml Susceptible     Meropenem <=1 ug/ml Susceptible     Nitrofurantoin <=32 ug/ml Susceptible     Piperacillin + Tazobactam <=16 ug/ml Susceptible     Tetracycline <=4 ug/ml Susceptible     Tobramycin <=4 ug/ml Susceptible     Trimethoprim + Sulfamethoxazole <=2/38 ug/ml Susceptible                        Results for orders placed during the hospital encounter of 07/11/18   Adult Transthoracic Echo Complete W/ Cont if Necessary Per Protocol    Narrative · Left ventricular systolic function is normal. Estimated EF = 65%.  · Left ventricular wall thickness is consistent with mild concentric   hypertrophy.  · Left ventricular diastolic dysfunction (grade II) consistent with   pseudonormalization.  · Estimated right ventricular systolic pressure from tricuspid   regurgitation is normal (<35 mmHg).          I have reviewed the medications.    Assessment/Plan    Assessment / Plan     Hospital Problem List     * (Principal)Acute on chronic hypoxemic respiratory failure not requiring ventilatory support    Mixed anxiety depressive disorder    Atrial fibrillation (CMS/HCC)    COPD (chronic obstructive pulmonary disease) (CMS/HCC)    Type 2 diabetes mellitus (CMS/HCC)    Dyslipidemia    Hypertension    Pulmonary nodule right lower lobe medially (2 x 1.5 cm).    Overview Signed 5/22/2017  1:30 PM by Janine Mckeon PA-C     Description: A.  CT scan of the chest February 11 thousand 16 reports a noncalcified 8.5-9 mm nodule in the medial portion of the right lower lobe.         Rheumatoid arthritis (CMS/HCC)    Invasive ductal carcinoma of breast, left (CMS/HCC)    Chronic hypoxemic respiratory failure (CMS/HCC)    Overview Signed 7/12/2018  2:01 PM by Blade Taylor MD     2Lnc chronically         Dyspnea    Atypical chest pain    Acute on chronic diastolic CHF (congestive heart failure) (CMS/HCC)    Constipation         Brief Hospital Course to date:  Betsy Chi is a 62 y.o. female w/ metastatic breast cancer, copd, chronic 2Lnc use, ? Hx afib, dm2 ra presented as direct admisison from dr. centeno office for multiple complaints including diffuse pain, dyspnea.      Assessment & Plan:  - Abdominal pain and vomiting, etiology unknown, continue supportive therapy as below. IVF, antiemetics  ---slowly improving, still with intermittent nausea, no vomiting, but poor appetite     - BRET, suspect this was pre-renal, sec to n/v, Cr baseline is 0.7-0.8.    Resolved after IVF.      - Ecoli UTI, s/p merrem, now on cefdinir - end date in place    - Metastatic breast CA , Dr Centeno following, continue letrozole, s/p zometa 7/12, plan to start ibrance once she returns home  -- palliative following for symptoms.  Ketamine gtt dcd yesterday due to vision issues reported.  Poss plans to transition to PO ketamine at discharge if can tolerate, pending palliative recs      - Acute  "respiratory failure, suspect volume overload, echo normal ef, diastolic dysfunction, ct angio no pulm embolism, continue prn diuresis, O2 supp, hold on steroids  Improved     - Back pain with diffuse \"bone pain\", suspect this is sec to bony mets, palliative consulted and assisting. Palliative managing pain control.   Pt report a little improvement noted.     - Cards & heme-onc & palliative following (no ischemic evaluation, treat medically), suspected Afib per cards notes (data deficient)  - Continue bowel regimen (miralax bid, senokot bid, prn dulcolax) as constipation likely causing some discomfort  - steroid cream to rash low back  --ok to dc home per oncology per Dr Dailey note 7/21 when medically ready     Dispo - home early to mid week once symptoms better controlled.    DVT Prophylaxis: [facility]     CODE STATUS:   Code Status and Medical Interventions:   Ordered at: 07/20/18 1308     Limited Support to NOT Include:    Vasopressors    Intubation    Cardioversion/Defibrillation     Code Status:    No CPR     Medical Interventions (Level of Support Prior to Arrest):    Limited       Disposition: anticipate d/c home once pain is reasonably controlled, ??TBD    Electronically signed by PRAFUL Murdock, 07/22/18, 12:54 PM.      Electronically signed by PRAFUL Murdock at 7/22/2018  1:01 PM     Mesha Arguello MD at 7/21/2018  7:47 PM          Palliative Care Progress Note    Date of Admission: 7/11/2018    Subjective:    Denies pain at present.  Says she actually had a good night.  Does, however, report crying all morning and doesn't know why.      Developed nausea and double vision so Ketamine infusion on hold since about 12:30.  Pt worried dose is too high.  Feeling better now (time of visit around 1400).  Ate soup and jello for lunch w/o difficulty.      Current Code Status     Date Active Code Status Order ID Comments User Context       7/11/2018 12:54 PM CPR [id number]  Ania De Leon, " "DO Inpatient       Questions for Current Code Status     Question Answer Comment    Code Status CPR     Medical Interventions (Level of Support Prior to Arrest) Full     Level Of Support Discussed With Patient         Meds reviewed: ativan Q8, marinol BID    ketamine (KETALAR) infusion 7 mcg/kg/min Last Rate: Stopped (07/21/18 1220)       Objective: /75 (BP Location: Right arm, Patient Position: Sitting)   Pulse 74   Temp 98 °F (36.7 °C) (Oral)   Resp 17   Ht 160 cm (62.99\")   Wt 82.1 kg (181 lb)   SpO2 (!) 89%   BMI 32.07 kg/m²       Intake/Output Summary (Last 24 hours) at 07/21/18 1947  Last data filed at 07/21/18 1302   Gross per 24 hour   Intake              150 ml   Output                0 ml   Net              150 ml     Physical Exam:  Gen: Chronically ill appearing female, in bed, looks unhappy  Head/neck: NC/AT, trachea midline  EENT: EOMI, patent nares, mucous membranes moist  Resp: Clear to auscultation bilaterally, respiratory effort normal   CV: RRR, no murmur  Abd: Soft, nontender, nondistended  Ext: No bilateral ankle edema  Skin: warm, dry, psoriasis noted on hands  Neuro: Awake, alert, no focal deficit, no myoclonus  Psychiatric: Congruent affect - sad, worried      Results from last 7 days  Lab Units 07/20/18  0650   WBC 10*3/mm3 5.82   HEMOGLOBIN g/dL 12.3   HEMATOCRIT % 37.8   PLATELETS 10*3/mm3 208       Results from last 7 days  Lab Units 07/20/18  0650   SODIUM mmol/L 139   POTASSIUM mmol/L 4.0   CHLORIDE mmol/L 108   CO2 mmol/L 25.0   BUN mg/dL 11   CREATININE mg/dL 0.62   CALCIUM mg/dL 8.5*   GLUCOSE mg/dL 106*       Impression:   63yo F with Breast cancer metastatic to bone    Symptoms:  Back pain and diffuse Bone pain  Nausea  Double vision, resolved  Dysphagia  Dyspnea  Depression, mood lability    Plan:    -Acute double vision resolved since ketamine infusion held.  Pt asks to hold off resuming infusion for now.  -If pain exacerbation today, will resume infusion at lower " "dose.    -If pt tolerates pain w/o ketamine infusion, will consider going ahead and initiating oral ketamine tomorrow in anticipation of therapy for home.  -Code status DNR per discussion with Dr. Ojeda.  Hospice also consulted to provide pt about home care.      Mesha Arguello MD  07/21/18  7:47 PM        Electronically signed by Mesha Arguello MD at 7/21/2018  8:04 PM     Monica Weiss MD at 7/21/2018  7:24 PM          S: This morning pain seems to be better controlled.  However she has significant emotional lability.  She is been crying all morning.      Past medical history, social history and family history was reviewed and unchanged from prior visit.    Review of Systems:    Review of Systems   A comprehensive 14 point review of systems was performed and was negative except as mentioned.      Medications:  The current medication list was reviewed in the EMR    ALLERGIES:    Allergies   Allergen Reactions   • Ampicillin Anaphylaxis     Tolerated cefdinir   • Erythromycin Anaphylaxis   • Morphine And Related Anaphylaxis   • Oxycodone-Acetaminophen Anaphylaxis   • Oxycodone-Aspirin Anaphylaxis   • Penicillins Anaphylaxis     Tolerated cefdinir   • Sulfa Antibiotics Swelling and Rash   • Fentanyl Hives     Hives, itching   • Aspirin GI Bleeding   • Codeine Swelling   • Contrast Dye Swelling   • Ibuprofen GI Bleeding   • Latex Arrhythmia   • Propoxyphene Nausea And Vomiting   • Saccharin Nausea And Vomiting   • Tramadol Swelling         Physical Exam    VITAL SIGNS:  /75 (BP Location: Right arm, Patient Position: Sitting)   Pulse 74   Temp 98 °F (36.7 °C) (Oral)   Resp 17   Ht 160 cm (62.99\")   Wt 82.1 kg (181 lb)   SpO2 (!) 89%   BMI 32.07 kg/m²    Temp:  [98 °F (36.7 °C)-98.6 °F (37 °C)] 98 °F (36.7 °C)      Performance Status: 1    Physical Exam    General: well appearing, very emotional  HEENT: sclera anicteric, oropharynx clear, neck is supple  Lymphatics: no cervical, " supraclavicular, or axillary adenopathy  Cardiovascular: regular rate and rhythm, no murmurs, rubs or gallops  Lungs: clear to auscultation bilaterally  Abdomen: soft, nontender, nondistended.  No palpable organomegaly  Extremities: no lower extremity edema  Skin: no rashes, lesions, bruising, or petechiae  Msk:  Shows no weakness of the large muscle groups  Psych: Mood is fairly down        RECENT LABS:    Lab Results   Component Value Date    HGB 12.3 07/20/2018    HCT 37.8 07/20/2018    MCV 92.9 07/20/2018     07/20/2018    WBC 5.82 07/20/2018    NEUTROABS 4.87 07/11/2018    LYMPHSABS 1.63 07/11/2018    MONOSABS 0.72 07/11/2018    EOSABS 0.09 07/11/2018    BASOSABS 0.03 07/11/2018       Lab Results   Component Value Date    GLUCOSE 106 (H) 07/20/2018    BUN 11 07/20/2018    CREATININE 0.62 07/20/2018     07/20/2018    K 4.0 07/20/2018     07/20/2018    CO2 25.0 07/20/2018    CALCIUM 8.5 (L) 07/20/2018    PROTEINTOT 7.8 07/11/2018    ALBUMIN 4.94 (H) 07/11/2018    BILITOT 0.8 07/11/2018    ALKPHOS 138 (H) 07/11/2018    AST 24 07/11/2018    ALT 28 07/11/2018         Assessment/Plan    1.  Metastatic breast cancer: Patient currently on letrozole.  She will start Ibrance at home.    2.  Pain: Patient currently on ketamine drip.  Pain seems better control.  We defined a nice transition from home.  Palliative following for this.    3.  Severe depression: There is data to suggest ketamine drip can help improve this.  Hopefully she starts feeling better in the next several days.      Patient can be discharged home once pain is controlled.        Monica Weiss MD  [facility] Hematology and Oncology    7/21/2018     Please note that portions of this note may have been completed with a voice recognition program. Efforts were made to edit the dictations, but occasionally words are mistranscribed.    Electronically signed by Monica Weiss MD at 7/21/2018  7:28 PM     Shahriar Kellogg MD at  2018  3:48 PM              [facility] Medicine Services  PROGRESS NOTE    Patient Name: Betsy Chi  : 1955  MRN: [id number]    Date of Admission: 2018  Length of Stay: 10  Primary Care Physician: PRAFUL Wheatley    Subjective   Subjective     CC: f/u n/v and UTI    HPI:  Didn't feel well this morning.  [place] like ketamine gtt was too high, + double vision.  Currently turned off and she is feeling better.  No other complaints.    Review of Systems  Gen- No fevers, chills  CV- No chest pain, palpitations  Resp- No cough, dyspnea  GI-  No V/D, abd pain, +nausea intermittently    Otherwise ROS is negative except as mentioned in the HPI.    Objective   Objective     Vital Signs:   Temp:  [98.1 °F (36.7 °C)-98.6 °F (37 °C)] 98.5 °F (36.9 °C)  Heart Rate:  [69-90] 78  Resp:  [18] 18  BP: (109-129)/(61-67) 118/66        Physical Exam:  Constitutional: No acute distress, awake, alert, chronically ill appearing  HENT: NCAT, mucous membranes moist  Respiratory: Clear to auscultation bilaterally, respiratory effort normal   Cardiovascular: RRR, no murmurs, rubs, or gallops,  Gastrointestinal: Positive bowel sounds, soft, nontender, nondistended  Musculoskeletal: No bilateral ankle edema  Psychiatric: Appropriate affect, cooperative  Neurologic: Oriented x 3, no focal deficits  Skin: + psoriasis patches      Results Reviewed:  I have personally reviewed current lab, radiology, and data and agree.      Results from last 7 days  Lab Units 18  0650   WBC 10*3/mm3 5.82   HEMOGLOBIN g/dL 12.3   HEMATOCRIT % 37.8   PLATELETS 10*3/mm3 208       Results from last 7 days  Lab Units 18  0650   SODIUM mmol/L 139   POTASSIUM mmol/L 4.0   CHLORIDE mmol/L 108   CO2 mmol/L 25.0   BUN mg/dL 11   CREATININE mg/dL 0.62   GLUCOSE mg/dL 106*   CALCIUM mg/dL 8.5*     Estimated Creatinine Clearance: 93.1 mL/min (by C-G formula based on SCr of 0.62 mg/dL).  No results found for:  BNP    Microbiology Results Abnormal     Procedure Component Value - Date/Time    Urine Culture - Urine, [[id number]]  (Abnormal)  (Susceptibility) Collected:  07/13/18 1636    Lab Status:  Final result Specimen:  Urine from Urine, Clean Catch Updated:  07/15/18 1032     Urine Culture >100,000 CFU/mL Escherichia coli (A)    Susceptibility      Escherichia coli     CHAMP     Ampicillin <=8 ug/ml Susceptible     Ampicillin + Sulbactam <=8/4 ug/ml Susceptible     Aztreonam <=8 ug/ml Susceptible     Cefepime <=8 ug/ml Susceptible     Cefotaxime <=2 ug/ml Susceptible     Ceftriaxone <=8 ug/ml Susceptible     Cefuroxime sodium <=4 ug/ml Susceptible     Cephalothin 16 ug/ml Intermediate     Ertapenem <=1 ug/ml Susceptible     Gentamicin <=4 ug/ml Susceptible     Levofloxacin <=2 ug/ml Susceptible     Meropenem <=1 ug/ml Susceptible     Nitrofurantoin <=32 ug/ml Susceptible     Piperacillin + Tazobactam <=16 ug/ml Susceptible     Tetracycline <=4 ug/ml Susceptible     Tobramycin <=4 ug/ml Susceptible     Trimethoprim + Sulfamethoxazole <=2/38 ug/ml Susceptible                        Results for orders placed during the hospital encounter of 07/11/18   Adult Transthoracic Echo Complete W/ Cont if Necessary Per Protocol    Narrative · Left ventricular systolic function is normal. Estimated EF = 65%.  · Left ventricular wall thickness is consistent with mild concentric   hypertrophy.  · Left ventricular diastolic dysfunction (grade II) consistent with   pseudonormalization.  · Estimated right ventricular systolic pressure from tricuspid   regurgitation is normal (<35 mmHg).          I have reviewed the medications.    Assessment/Plan   Assessment / Plan     Hospital Problem List     * (Principal)Acute on chronic hypoxemic respiratory failure not requiring ventilatory support    Mixed anxiety depressive disorder    Atrial fibrillation (CMS/HCC)    COPD (chronic obstructive pulmonary disease) (CMS/HCC)    Type 2 diabetes mellitus  "(CMS/HCC)    Dyslipidemia    Hypertension    Pulmonary nodule right lower lobe medially (2 x 1.5 cm).    Overview Signed 5/22/2017  1:30 PM by Janine Mckeon PA-C     Description: A.  CT scan of the chest February 11 thousand 16 reports a noncalcified 8.5-9 mm nodule in the medial portion of the right lower lobe.         Rheumatoid arthritis (CMS/HCC)    Invasive ductal carcinoma of breast, left (CMS/HCC)    Chronic hypoxemic respiratory failure (CMS/HCC)    Overview Signed 7/12/2018  2:01 PM by Blade Taylor MD     2Lnc chronically         Dyspnea    Atypical chest pain    Acute on chronic diastolic CHF (congestive heart failure) (CMS/HCC)    Constipation         Brief Hospital Course to date:  Betsy Chi is a 62 y.o. female w/ metastatic breast cancer, copd, chronic 2Lnc use, ? Hx afib, dm2 ra presented as direct admisison from dr. centeno office for multiple complaints including diffuse pain, dyspnea.      Assessment & Plan:  - Abdominal pain and vomiting, etiology unknown, continue supportive therapy as below. IVF, antiemetics  ---slowly improving, now with intermittent nausea, no vomiting, but poor appetite     - BRET, suspect this is pre-renal, sec to n/v, Cr baseline is 0.7-0.8.  Resolved after IVF.      - Ecoli UTI, s/p merrem, now on cefdinir - end date in place      - Metastatic breast CA , Dr Centeno following, continue letrozole, s/p zometa 7/12, plan to start ibrance once she returns home  -- palliative following for symptoms.  On ketamine gtt with plans to transition to PO at discharge.  Currently off, will defer to palliative on resuming/dose.    - Acute respiratory failure, suspect volume overload, echo normal ef, diastolic dysfunction, ct angio no pulm embolism, continue prn diuresis, O2 supp, hold on steroids  Improved     - Back pain with diffuse \"bone pain\", suspect this is sec to bony mets, palliative consulted and assisting. Palliative started on Ketamine infusion  for pain control. "   Pt report a little improvement noted.     - Cards & heme-onc & palliative following (no ischemic evaluation, treat medically), suspected Afib per cards notes (data deficient)  - Continue bowel regimen (miralax bid, senokot bid, prn dulcolax) as constipation likely causing some discomfort  - steroid cream to rash low back     Dispo - home early to mid week once symptoms better controlled.    DVT Prophylaxis: [facility]     CODE STATUS:   Code Status and Medical Interventions:   Ordered at: 07/20/18 1308     Limited Support to NOT Include:    Vasopressors    Intubation    Cardioversion/Defibrillation     Code Status:    No CPR     Medical Interventions (Level of Support Prior to Arrest):    Limited       Disposition: anticipate d/c home once pain is reasonably controlled, ??TBD    Electronically signed by Shahriar Kellogg MD, 07/21/18, 3:48 PM.      Electronically signed by Shahriar Kellogg MD at 7/21/2018  3:53 PM

## 2018-07-24 NOTE — PROGRESS NOTES
Continued Stay Note  [facility]     Patient Name: Betsy Chi  MRN: [id number]  Today's Date: 7/24/2018    Admit Date: 7/11/2018          Discharge Plan     Row Name 07/24/18 1739       Plan    Plan [facility] services    Patient/Family in Agreement with Plan yes    Final Discharge Disposition Code 50 - home with hospice    Final Note D/C per private car to [facility] services.  Ketamine solution provided per MD order by hospice for home medication regimen.  Update to [facility].  Pt given contact number for hospice on arrival home.  DME ordered to be delivered in the AM.  Hospice RN to deliver nebulizer this PM.              Discharge Codes    No documentation.       Expected Discharge Date and Time     Expected Discharge Date Expected Discharge Time    Jul 24, 2018             Keira Hoffman RN

## 2018-07-26 RX ORDER — ATORVASTATIN CALCIUM 20 MG/1
TABLET, FILM COATED ORAL
Qty: 90 TABLET | Refills: 0 | Status: SHIPPED | OUTPATIENT
Start: 2018-07-26 | End: 2018-11-01 | Stop reason: SDUPTHER

## 2018-08-03 ENCOUNTER — TELEPHONE (OUTPATIENT)
Dept: INTERNAL MEDICINE | Facility: CLINIC | Age: 63
End: 2018-08-03

## 2018-08-03 NOTE — TELEPHONE ENCOUNTER
----- Message from Maite Dupree sent at 8/3/2018  2:14 PM EDT -----  Rocío from [facility] came by office to inform us pt was admitted to hospice after a referral while she was in the hospital. She just wanted you to know that.

## 2018-09-24 DIAGNOSIS — I10 ESSENTIAL HYPERTENSION: ICD-10-CM

## 2018-09-24 RX ORDER — AMLODIPINE BESYLATE 10 MG/1
TABLET ORAL
Qty: 30 TABLET | Refills: 0 | Status: SHIPPED | OUTPATIENT
Start: 2018-09-24

## 2018-11-01 RX ORDER — ATORVASTATIN CALCIUM 20 MG/1
TABLET, FILM COATED ORAL
Qty: 90 TABLET | Refills: 0 | Status: SHIPPED | OUTPATIENT
Start: 2018-11-01

## 2024-02-20 NOTE — ANESTHESIA PROCEDURE NOTES
Airway  Urgency: elective    Airway not difficult    General Information and Staff    Patient location during procedure: OR  CRNA: MATY ANTUNEZ    Indications and Patient Condition  Indications for airway management: airway protection    Preoxygenated: yes  MILS not maintained throughout  Mask difficulty assessment: 1 - vent by mask    Final Airway Details  Final airway type: endotracheal airway      Successful airway: ETT  Cuffed: yes   Successful intubation technique: direct laryngoscopy  Endotracheal tube insertion site: oral  Blade: Satish  Blade size: #3  ETT size: 9.0 mm  Cormack-Lehane Classification: grade IIa - partial view of glottis  Placement verified by: chest auscultation and capnometry   Measured from: lips  ETT to lips (cm): 20  Number of attempts at approach: 1    Additional Comments  Negative epigastric sounds, Breath sound equal bilaterally with symmetric chest rise and fall            
[place]

## 2025-04-01 NOTE — TELEPHONE ENCOUNTER
Granddaughter called and states patient is confused about diagnosis' and appointments. Patient believes she has colon cancer and she has an appointment with Dr. OLMSTEAD on 05/28/2018. Informed Johanna both of these statements are incorrect. Notified her patient's appointment with Dr. OLMSTEAD is 05/23/18 at 1330. Provided her with directions to his breast [facility] clinic as well. She v/u. Also received phone call from patient's son, Camacho Stringer with same concerns. Explained patient is recommended to have repeat colonoscopy [id number] per documentation in chart but she does have known breast cancer and suspicious lung biopsy. Explained her plan per Dr. Mulligan and appointment with Dr. OLMSTEAD. He v/u. He and Johanna are both concerned regarding patient's confusions related to recent illness and diagnosis'. Encouraged them to discuss with Dr. OLMSTEAD tomorrow but also encouraged them to explore the possibility of infection such as UTI as she has significant history for UTI. Johanna plans to go to her grandmother's house later today and will assess this. Encouraged them to call with questions or concerns as this is a complex situation. They v/u and agreeable. Also notified Dr. OLMSTEAD's assistant and breast navigator of lung nodule plan.   
There are no Wet Read(s) to document.

## (undated) DEVICE — TRAP,MUCUS SPECIMEN,40CC: Brand: MEDLINE

## (undated) DEVICE — MEDI-VAC YANKAUER SUCTION HANDLE W/BULBOUS TIP: Brand: CARDINAL HEALTH

## (undated) DEVICE — Device: Brand: SINGLE USE ASPIRATION NEEDLE NA-U401SX

## (undated) DEVICE — WANG TRANSBRONCHIAL HISTOLOGY NEEDLE FOR CENTRAL REGION, 1.9 MM X 130 CM: Brand: WANG

## (undated) DEVICE — ST EXT MICROBORE FIX M LL 38IN

## (undated) DEVICE — 3-WAY STOPCOCK: Brand: MAXGUARD

## (undated) DEVICE — NDL PULM SUPERDIMENSION ARCPOINT 18G

## (undated) DEVICE — FRCP BIOP SUPERDIMENSION PREMARK

## (undated) DEVICE — CANNULA,OXY,ADULT,SUPERSOFT,W/7'TUB,UC: Brand: MEDLINE

## (undated) DEVICE — MEDI-VAC NON-CONDUCTIVE SUCTION TUBING: Brand: CARDINAL HEALTH

## (undated) DEVICE — SINGLE USE BIOPSY VALVE MAJ-210: Brand: SINGLE USE BIOPSY VALVE (STERILE)

## (undated) DEVICE — SINGLE USE SUCTION VALVE MAJ-209: Brand: SINGLE USE SUCTION VALVE (STERILE)

## (undated) DEVICE — SOL IRR NACL 0.9PCT BT 1000ML

## (undated) DEVICE — BOWL UTIL STRL 32OZ

## (undated) DEVICE — KT BRONCH NAV EDGE 180D EXT WO/ ADAPT OLYMPUS

## (undated) DEVICE — Device: Brand: BALLOON

## (undated) DEVICE — ST NDL BRONCH ASP VIZISHOT 2 FLX 19GA